# Patient Record
Sex: FEMALE | Race: BLACK OR AFRICAN AMERICAN | Employment: PART TIME | URBAN - METROPOLITAN AREA
[De-identification: names, ages, dates, MRNs, and addresses within clinical notes are randomized per-mention and may not be internally consistent; named-entity substitution may affect disease eponyms.]

---

## 2018-10-09 ENCOUNTER — HOSPITAL ENCOUNTER (EMERGENCY)
Age: 43
Discharge: HOME | End: 2018-10-09
Attending: EMERGENCY MEDICINE
Payer: MEDICAID

## 2018-10-09 VITALS
RESPIRATION RATE: 18 BRPM | DIASTOLIC BLOOD PRESSURE: 112 MMHG | OXYGEN SATURATION: 99 % | HEART RATE: 87 BPM | TEMPERATURE: 98.1 F | SYSTOLIC BLOOD PRESSURE: 159 MMHG | WEIGHT: 189 LBS | BODY MASS INDEX: 31.45 KG/M2

## 2018-10-09 DIAGNOSIS — J20.9 ACUTE BRONCHITIS, UNSPECIFIED ORGANISM: Primary | ICD-10-CM

## 2018-10-09 PROCEDURE — G0381 LEV 2 HOSP TYPE B ED VISIT: HCPCS

## 2018-10-09 PROCEDURE — 99282 EMERGENCY DEPT VISIT SF MDM: CPT

## 2018-10-09 RX ORDER — BROMPHENIRAMINE MALEATE, PSEUDOEPHEDRINE HYDROCHLORIDE, AND DEXTROMETHORPHAN HYDROBROMIDE 2; 30; 10 MG/5ML; MG/5ML; MG/5ML
5 SYRUP ORAL 4 TIMES DAILY PRN
Qty: 120 ML | Refills: 0 | Status: SHIPPED | OUTPATIENT
Start: 2018-10-09 | End: 2018-11-01

## 2018-10-09 RX ORDER — DOXYCYCLINE 100 MG/1
100 CAPSULE ORAL 2 TIMES DAILY
Qty: 20 CAPSULE | Refills: 0 | Status: SHIPPED | OUTPATIENT
Start: 2018-10-09 | End: 2018-11-01

## 2018-10-09 ASSESSMENT — ENCOUNTER SYMPTOMS
SINUS PRESSURE: 0
NAUSEA: 0
VOMITING: 0
EYE DISCHARGE: 0
EYE PAIN: 0
ABDOMINAL DISTENTION: 0
SHORTNESS OF BREATH: 0
DIARRHEA: 0
EYE REDNESS: 0
WHEEZING: 0
RHINORRHEA: 1
COUGH: 1
SORE THROAT: 0
BACK PAIN: 0

## 2018-11-01 ENCOUNTER — HOSPITAL ENCOUNTER (EMERGENCY)
Age: 43
Discharge: HOME | End: 2018-11-01
Attending: EMERGENCY MEDICINE
Payer: MEDICAID

## 2018-11-01 VITALS
TEMPERATURE: 98.2 F | RESPIRATION RATE: 20 BRPM | WEIGHT: 198 LBS | DIASTOLIC BLOOD PRESSURE: 94 MMHG | BODY MASS INDEX: 32.95 KG/M2 | HEART RATE: 84 BPM | OXYGEN SATURATION: 100 % | SYSTOLIC BLOOD PRESSURE: 144 MMHG

## 2018-11-01 DIAGNOSIS — Z20.2 POSSIBLE EXPOSURE TO STD: Primary | ICD-10-CM

## 2018-11-01 LAB
BILIRUBIN URINE: NEGATIVE
BLOOD, URINE: NEGATIVE
CLARITY: CLEAR
COLOR: YELLOW
GLUCOSE URINE: NEGATIVE MG/DL
HCG(URINE) PREGNANCY TEST: NEGATIVE
KETONES, URINE: NEGATIVE MG/DL
LEUKOCYTE ESTERASE, URINE: NEGATIVE
NITRITE, URINE: NEGATIVE
PH UA: 7.5 (ref 5–9)
PROTEIN UA: NEGATIVE MG/DL
SPECIFIC GRAVITY UA: 1.01 (ref 1–1.03)
UROBILINOGEN, URINE: 0.2 E.U./DL

## 2018-11-01 PROCEDURE — 81025 URINE PREGNANCY TEST: CPT

## 2018-11-01 PROCEDURE — 6370000000 HC RX 637 (ALT 250 FOR IP): Performed by: EMERGENCY MEDICINE

## 2018-11-01 PROCEDURE — 87491 CHLMYD TRACH DNA AMP PROBE: CPT

## 2018-11-01 PROCEDURE — G0382 LEV 3 HOSP TYPE B ED VISIT: HCPCS

## 2018-11-01 PROCEDURE — 99283 EMERGENCY DEPT VISIT LOW MDM: CPT

## 2018-11-01 PROCEDURE — 87591 N.GONORRHOEAE DNA AMP PROB: CPT

## 2018-11-01 PROCEDURE — 6360000002 HC RX W HCPCS: Performed by: EMERGENCY MEDICINE

## 2018-11-01 PROCEDURE — 96372 THER/PROPH/DIAG INJ SC/IM: CPT

## 2018-11-01 PROCEDURE — 81003 URINALYSIS AUTO W/O SCOPE: CPT

## 2018-11-01 RX ORDER — AZITHROMYCIN 250 MG/1
1000 TABLET, FILM COATED ORAL ONCE
Status: COMPLETED | OUTPATIENT
Start: 2018-11-01 | End: 2018-11-01

## 2018-11-01 RX ORDER — CEFTRIAXONE SODIUM 250 MG/1
250 INJECTION, POWDER, FOR SOLUTION INTRAMUSCULAR; INTRAVENOUS ONCE
Status: COMPLETED | OUTPATIENT
Start: 2018-11-01 | End: 2018-11-01

## 2018-11-01 RX ADMIN — CEFTRIAXONE 250 MG: 250 INJECTION, POWDER, FOR SOLUTION INTRAMUSCULAR; INTRAVENOUS at 14:24

## 2018-11-01 RX ADMIN — AZITHROMYCIN 1000 MG: 250 TABLET, FILM COATED ORAL at 14:25

## 2018-11-01 NOTE — ED PROVIDER NOTES
addition to providing specific details for the plan of care and counseling regarding the diagnosis and prognosis. Questions are answered at this time and they are agreeable with the plan.      --------------------------------- IMPRESSION AND DISPOSITION ---------------------------------    IMPRESSION  1.  Possible exposure to STD        DISPOSITION  Disposition: Discharge to home  Patient condition is good                  Mateus Da Silva MD  11/01/18 1443

## 2018-11-06 LAB
CHLAMYDIA TRACHOMATIS AMPLIFIED DET: NORMAL
N GONORRHOEAE AMPLIFIED DET: NORMAL

## 2018-11-15 RX ORDER — SODIUM CHLORIDE 9 MG/ML
INJECTION, SOLUTION INTRAVENOUS CONTINUOUS
Status: CANCELLED | OUTPATIENT
Start: 2018-11-28

## 2018-11-16 ENCOUNTER — HOSPITAL ENCOUNTER (OUTPATIENT)
Dept: PREADMISSION TESTING | Age: 43
Discharge: HOME | End: 2018-11-16
Payer: MEDICAID

## 2018-11-16 VITALS
HEIGHT: 65 IN | SYSTOLIC BLOOD PRESSURE: 140 MMHG | OXYGEN SATURATION: 99 % | HEART RATE: 71 BPM | RESPIRATION RATE: 16 BRPM | DIASTOLIC BLOOD PRESSURE: 82 MMHG | BODY MASS INDEX: 32.37 KG/M2 | WEIGHT: 194.31 LBS | TEMPERATURE: 98.5 F

## 2018-11-16 DIAGNOSIS — E11.9 TYPE 2 DIABETES MELLITUS WITHOUT COMPLICATION, WITHOUT LONG-TERM CURRENT USE OF INSULIN (HCC): Primary | ICD-10-CM

## 2018-11-16 NOTE — PROGRESS NOTES
[address]                                                                                                                     PRE OP INSTRUCTIONS FOR  Davonte Leaver        Date: 11/16/2018    Date and time of surgery: 11/28/18   Arrival Time: will call you on Tuesday 11/27 with surgery and arrival time report to main lobby stop at information desk    1. Do not eat or drink anything after 12 midnight prior to surgery. This includes no water, chewing gum, mints or ice chips. 2. Take the following pills with a small sip of water on the morning of Surgery: take inhaler if needed and bring inhaler    3. Diabetics may take evening dose of insulin but none after midnight. If you feel symptomatic or low blood sugar take 1-2 ounces of apple juice only. 4. Aspirin, Ibuprofen, Advil, Naproxen, Vitamin E and other Anti-inflammatory products should be stopped  before surgery  as directed by your physician. 5. Check with your Doctor regarding stopping Plavix, Coumadin, Lovenox, Fragmin or other blood thinners. 6. Do not smoke,use illicit drugs and do not drink any alcoholic beverages 24 hours prior to surgery. 7. You may brush your teeth and gargle the morning of surgery. DO NOT SWALLOW WATER    8. You MUST make arrangements for a responsible adult to take you home after your surgery. You will not be allowed to leave alone or drive yourself home. It is strongly suggested someone stay with you the first 24 hrs. Your surgery will be cancelled if you do not have a ride home. 9. A parent/legal guardian must accompany a child scheduled for surgery and plan to stay at the hospital until the child is discharged. Please do not bring other children with you. 10. Please wear simple, loose fitting clothing to the hospital.  Yvette Fine not bring valuables (money, credit cards, checkbooks, etc.) Do not wear any makeup (including no eye makeup) or nail polish on your fingers or toes.     11. DO NOT wear any

## 2018-11-27 PROBLEM — R10.2 PELVIC PAIN IN FEMALE: Status: ACTIVE | Noted: 2018-11-27

## 2018-11-27 LAB
ABO/RH: NORMAL
AMPHETAMINE SCREEN, URINE: NOT DETECTED
ANION GAP SERPL CALCULATED.3IONS-SCNC: 11 MMOL/L (ref 7–16)
ANTIBODY SCREEN: NORMAL
BARBITURATE SCREEN URINE: NOT DETECTED
BENZODIAZEPINE SCREEN, URINE: NOT DETECTED
BUN BLDV-MCNC: 6 MG/DL (ref 6–20)
CALCIUM SERPL-MCNC: 8.8 MG/DL (ref 8.6–10.2)
CANNABINOID SCREEN URINE: POSITIVE
CHLORIDE BLD-SCNC: 103 MMOL/L (ref 98–107)
CO2: 25 MMOL/L (ref 22–29)
COCAINE METABOLITE SCREEN URINE: NOT DETECTED
CREAT SERPL-MCNC: 0.6 MG/DL (ref 0.5–1)
EKG ATRIAL RATE: 73 BPM
EKG P AXIS: 62 DEGREES
EKG P-R INTERVAL: 196 MS
EKG Q-T INTERVAL: 402 MS
EKG QRS DURATION: 76 MS
EKG QTC CALCULATION (BAZETT): 442 MS
EKG R AXIS: 32 DEGREES
EKG T AXIS: 19 DEGREES
EKG VENTRICULAR RATE: 73 BPM
GFR AFRICAN AMERICAN: >60
GFR NON-AFRICAN AMERICAN: >60 ML/MIN/1.73
GLUCOSE BLD-MCNC: 103 MG/DL (ref 74–99)
HCG(URINE) PREGNANCY TEST: NEGATIVE
HCT VFR BLD CALC: 37 % (ref 34–48)
HEMOGLOBIN: 12.7 G/DL (ref 11.5–15.5)
MCH RBC QN AUTO: 26.6 PG (ref 26–35)
MCHC RBC AUTO-ENTMCNC: 34.3 % (ref 32–34.5)
MCV RBC AUTO: 77.4 FL (ref 80–99.9)
METHADONE SCREEN, URINE: NOT DETECTED
OPIATE SCREEN URINE: NOT DETECTED
PDW BLD-RTO: 12.6 FL (ref 11.5–15)
PHENCYCLIDINE SCREEN URINE: NOT DETECTED
PLATELET # BLD: 258 E9/L (ref 130–450)
PMV BLD AUTO: 9.3 FL (ref 7–12)
POTASSIUM REFLEX MAGNESIUM: 3.7 MMOL/L (ref 3.5–5)
PROPOXYPHENE SCREEN: NOT DETECTED
RBC # BLD: 4.78 E12/L (ref 3.5–5.5)
SODIUM BLD-SCNC: 139 MMOL/L (ref 132–146)
WBC # BLD: 8.9 E9/L (ref 4.5–11.5)

## 2018-11-27 PROCEDURE — 93005 ELECTROCARDIOGRAM TRACING: CPT | Performed by: ANESTHESIOLOGY

## 2018-11-28 ENCOUNTER — ANESTHESIA (OUTPATIENT)
Dept: OPERATING ROOM | Age: 43
DRG: 513 | End: 2018-11-28
Payer: MEDICAID

## 2018-11-28 ENCOUNTER — HOSPITAL ENCOUNTER (INPATIENT)
Age: 43
LOS: 3 days | Discharge: HOME | DRG: 513 | End: 2018-12-01
Attending: OBSTETRICS & GYNECOLOGY | Admitting: OBSTETRICS & GYNECOLOGY
Payer: MEDICAID

## 2018-11-28 ENCOUNTER — ANESTHESIA EVENT (OUTPATIENT)
Dept: OPERATING ROOM | Age: 43
DRG: 513 | End: 2018-11-28
Payer: MEDICAID

## 2018-11-28 VITALS
SYSTOLIC BLOOD PRESSURE: 167 MMHG | DIASTOLIC BLOOD PRESSURE: 103 MMHG | TEMPERATURE: 97.2 F | RESPIRATION RATE: 16 BRPM | OXYGEN SATURATION: 100 %

## 2018-11-28 DIAGNOSIS — R10.2 PELVIC PAIN IN FEMALE: Primary | ICD-10-CM

## 2018-11-28 DIAGNOSIS — G89.18 POSTOPERATIVE PAIN: ICD-10-CM

## 2018-11-28 LAB — METER GLUCOSE: 145 MG/DL (ref 74–99)

## 2018-11-28 PROCEDURE — 6360000002 HC RX W HCPCS: Performed by: ANESTHESIOLOGY

## 2018-11-28 PROCEDURE — 82962 GLUCOSE BLOOD TEST: CPT

## 2018-11-28 PROCEDURE — 0UT50ZZ RESECTION OF RIGHT FALLOPIAN TUBE, OPEN APPROACH: ICD-10-PCS | Performed by: OBSTETRICS & GYNECOLOGY

## 2018-11-28 PROCEDURE — 2580000003 HC RX 258: Performed by: ANESTHESIOLOGY

## 2018-11-28 PROCEDURE — 85027 COMPLETE CBC AUTOMATED: CPT

## 2018-11-28 PROCEDURE — 80048 BASIC METABOLIC PNL TOTAL CA: CPT

## 2018-11-28 PROCEDURE — 80307 DRUG TEST PRSMV CHEM ANLYZR: CPT

## 2018-11-28 PROCEDURE — 7100000000 HC PACU RECOVERY - FIRST 15 MIN: Performed by: OBSTETRICS & GYNECOLOGY

## 2018-11-28 PROCEDURE — G0480 DRUG TEST DEF 1-7 CLASSES: HCPCS

## 2018-11-28 PROCEDURE — 6360000002 HC RX W HCPCS

## 2018-11-28 PROCEDURE — 2580000003 HC RX 258: Performed by: OBSTETRICS & GYNECOLOGY

## 2018-11-28 PROCEDURE — 3600000004 HC SURGERY LEVEL 4 BASE: Performed by: OBSTETRICS & GYNECOLOGY

## 2018-11-28 PROCEDURE — 2500000003 HC RX 250 WO HCPCS

## 2018-11-28 PROCEDURE — 6360000002 HC RX W HCPCS: Performed by: OBSTETRICS & GYNECOLOGY

## 2018-11-28 PROCEDURE — 1200000000 HC SEMI PRIVATE

## 2018-11-28 PROCEDURE — 36415 COLL VENOUS BLD VENIPUNCTURE: CPT

## 2018-11-28 PROCEDURE — 2720000010 HC SURG SUPPLY STERILE: Performed by: OBSTETRICS & GYNECOLOGY

## 2018-11-28 PROCEDURE — 86900 BLOOD TYPING SEROLOGIC ABO: CPT

## 2018-11-28 PROCEDURE — 0UT90ZZ RESECTION OF UTERUS, OPEN APPROACH: ICD-10-PCS | Performed by: OBSTETRICS & GYNECOLOGY

## 2018-11-28 PROCEDURE — 6370000000 HC RX 637 (ALT 250 FOR IP): Performed by: OBSTETRICS & GYNECOLOGY

## 2018-11-28 PROCEDURE — 3600000014 HC SURGERY LEVEL 4 ADDTL 15MIN: Performed by: OBSTETRICS & GYNECOLOGY

## 2018-11-28 PROCEDURE — 86901 BLOOD TYPING SEROLOGIC RH(D): CPT

## 2018-11-28 PROCEDURE — 2709999900 HC NON-CHARGEABLE SUPPLY: Performed by: OBSTETRICS & GYNECOLOGY

## 2018-11-28 PROCEDURE — 81025 URINE PREGNANCY TEST: CPT

## 2018-11-28 PROCEDURE — 7100000001 HC PACU RECOVERY - ADDTL 15 MIN: Performed by: OBSTETRICS & GYNECOLOGY

## 2018-11-28 PROCEDURE — C1765 ADHESION BARRIER: HCPCS | Performed by: OBSTETRICS & GYNECOLOGY

## 2018-11-28 PROCEDURE — 2700000000 HC OXYGEN THERAPY PER DAY

## 2018-11-28 PROCEDURE — 86850 RBC ANTIBODY SCREEN: CPT

## 2018-11-28 PROCEDURE — 88307 TISSUE EXAM BY PATHOLOGIST: CPT

## 2018-11-28 PROCEDURE — 3700000001 HC ADD 15 MINUTES (ANESTHESIA): Performed by: OBSTETRICS & GYNECOLOGY

## 2018-11-28 PROCEDURE — 3700000000 HC ANESTHESIA ATTENDED CARE: Performed by: OBSTETRICS & GYNECOLOGY

## 2018-11-28 RX ORDER — ACETAMINOPHEN 325 MG/1
650 TABLET ORAL EVERY 4 HOURS PRN
Status: DISCONTINUED | OUTPATIENT
Start: 2018-11-28 | End: 2018-12-01 | Stop reason: HOSPADM

## 2018-11-28 RX ORDER — SODIUM CHLORIDE 0.9 % (FLUSH) 0.9 %
10 SYRINGE (ML) INJECTION PRN
Status: DISCONTINUED | OUTPATIENT
Start: 2018-11-28 | End: 2018-12-01 | Stop reason: HOSPADM

## 2018-11-28 RX ORDER — MORPHINE SULFATE 2 MG/ML
2 INJECTION, SOLUTION INTRAMUSCULAR; INTRAVENOUS EVERY 4 HOURS PRN
Status: DISCONTINUED | OUTPATIENT
Start: 2018-11-28 | End: 2018-12-01 | Stop reason: HOSPADM

## 2018-11-28 RX ORDER — NEOSTIGMINE METHYLSULFATE 1 MG/ML
INJECTION, SOLUTION INTRAVENOUS PRN
Status: DISCONTINUED | OUTPATIENT
Start: 2018-11-28 | End: 2018-11-28 | Stop reason: SDUPTHER

## 2018-11-28 RX ORDER — ROCURONIUM BROMIDE 10 MG/ML
INJECTION, SOLUTION INTRAVENOUS PRN
Status: DISCONTINUED | OUTPATIENT
Start: 2018-11-28 | End: 2018-11-28 | Stop reason: SDUPTHER

## 2018-11-28 RX ORDER — LABETALOL HYDROCHLORIDE 5 MG/ML
INJECTION, SOLUTION INTRAVENOUS PRN
Status: DISCONTINUED | OUTPATIENT
Start: 2018-11-28 | End: 2018-11-28 | Stop reason: SDUPTHER

## 2018-11-28 RX ORDER — DOCUSATE SODIUM 100 MG/1
100 CAPSULE, LIQUID FILLED ORAL 2 TIMES DAILY
Status: DISCONTINUED | OUTPATIENT
Start: 2018-11-28 | End: 2018-12-01 | Stop reason: HOSPADM

## 2018-11-28 RX ORDER — SODIUM CHLORIDE, SODIUM LACTATE, POTASSIUM CHLORIDE, CALCIUM CHLORIDE 600; 310; 30; 20 MG/100ML; MG/100ML; MG/100ML; MG/100ML
INJECTION, SOLUTION INTRAVENOUS CONTINUOUS
Status: DISCONTINUED | OUTPATIENT
Start: 2018-11-28 | End: 2018-12-01 | Stop reason: HOSPADM

## 2018-11-28 RX ORDER — SODIUM CHLORIDE 0.9 % (FLUSH) 0.9 %
10 SYRINGE (ML) INJECTION EVERY 12 HOURS SCHEDULED
Status: DISCONTINUED | OUTPATIENT
Start: 2018-11-28 | End: 2018-11-28 | Stop reason: HOSPADM

## 2018-11-28 RX ORDER — LIDOCAINE HYDROCHLORIDE 20 MG/ML
INJECTION, SOLUTION INFILTRATION; PERINEURAL PRN
Status: DISCONTINUED | OUTPATIENT
Start: 2018-11-28 | End: 2018-11-28 | Stop reason: SDUPTHER

## 2018-11-28 RX ORDER — ONDANSETRON 2 MG/ML
INJECTION INTRAMUSCULAR; INTRAVENOUS PRN
Status: DISCONTINUED | OUTPATIENT
Start: 2018-11-28 | End: 2018-11-28 | Stop reason: SDUPTHER

## 2018-11-28 RX ORDER — SODIUM CHLORIDE 0.9 % (FLUSH) 0.9 %
10 SYRINGE (ML) INJECTION EVERY 12 HOURS SCHEDULED
Status: DISCONTINUED | OUTPATIENT
Start: 2018-11-28 | End: 2018-12-01 | Stop reason: HOSPADM

## 2018-11-28 RX ORDER — ONDANSETRON 2 MG/ML
4 INJECTION INTRAMUSCULAR; INTRAVENOUS ONCE
Status: DISCONTINUED | OUTPATIENT
Start: 2018-11-28 | End: 2018-12-01 | Stop reason: HOSPADM

## 2018-11-28 RX ORDER — ONDANSETRON 2 MG/ML
4 INJECTION INTRAMUSCULAR; INTRAVENOUS EVERY 6 HOURS PRN
Status: DISCONTINUED | OUTPATIENT
Start: 2018-11-28 | End: 2018-12-01 | Stop reason: HOSPADM

## 2018-11-28 RX ORDER — MIDAZOLAM HYDROCHLORIDE 1 MG/ML
INJECTION INTRAMUSCULAR; INTRAVENOUS PRN
Status: DISCONTINUED | OUTPATIENT
Start: 2018-11-28 | End: 2018-11-28 | Stop reason: SDUPTHER

## 2018-11-28 RX ORDER — CEFOXITIN SODIUM 2 G/50ML
2 INJECTION, SOLUTION INTRAVENOUS ONCE
Status: COMPLETED | OUTPATIENT
Start: 2018-11-28 | End: 2018-11-28

## 2018-11-28 RX ORDER — SODIUM CHLORIDE 9 MG/ML
INJECTION, SOLUTION INTRAVENOUS CONTINUOUS
Status: DISCONTINUED | OUTPATIENT
Start: 2018-11-28 | End: 2018-11-28

## 2018-11-28 RX ORDER — SODIUM CHLORIDE, SODIUM LACTATE, POTASSIUM CHLORIDE, CALCIUM CHLORIDE 600; 310; 30; 20 MG/100ML; MG/100ML; MG/100ML; MG/100ML
INJECTION, SOLUTION INTRAVENOUS CONTINUOUS
Status: DISCONTINUED | OUTPATIENT
Start: 2018-11-28 | End: 2018-11-28

## 2018-11-28 RX ORDER — ONDANSETRON 2 MG/ML
8 INJECTION INTRAMUSCULAR; INTRAVENOUS EVERY 6 HOURS PRN
Status: DISCONTINUED | OUTPATIENT
Start: 2018-11-28 | End: 2018-11-28

## 2018-11-28 RX ORDER — DEXAMETHASONE SODIUM PHOSPHATE 10 MG/ML
INJECTION, SOLUTION INTRAMUSCULAR; INTRAVENOUS PRN
Status: DISCONTINUED | OUTPATIENT
Start: 2018-11-28 | End: 2018-11-28 | Stop reason: SDUPTHER

## 2018-11-28 RX ORDER — ALBUTEROL SULFATE 90 UG/1
2 AEROSOL, METERED RESPIRATORY (INHALATION) EVERY 6 HOURS PRN
Status: DISCONTINUED | OUTPATIENT
Start: 2018-11-28 | End: 2018-11-30 | Stop reason: SDUPTHER

## 2018-11-28 RX ORDER — FENTANYL CITRATE 50 UG/ML
INJECTION, SOLUTION INTRAMUSCULAR; INTRAVENOUS PRN
Status: DISCONTINUED | OUTPATIENT
Start: 2018-11-28 | End: 2018-11-28 | Stop reason: SDUPTHER

## 2018-11-28 RX ORDER — OXYCODONE HYDROCHLORIDE AND ACETAMINOPHEN 5; 325 MG/1; MG/1
1 TABLET ORAL EVERY 4 HOURS PRN
Status: DISCONTINUED | OUTPATIENT
Start: 2018-11-28 | End: 2018-12-01 | Stop reason: HOSPADM

## 2018-11-28 RX ORDER — PROPOFOL 10 MG/ML
INJECTION, EMULSION INTRAVENOUS PRN
Status: DISCONTINUED | OUTPATIENT
Start: 2018-11-28 | End: 2018-11-28 | Stop reason: SDUPTHER

## 2018-11-28 RX ORDER — GLYCOPYRROLATE 0.2 MG/ML
INJECTION INTRAMUSCULAR; INTRAVENOUS PRN
Status: DISCONTINUED | OUTPATIENT
Start: 2018-11-28 | End: 2018-11-28 | Stop reason: SDUPTHER

## 2018-11-28 RX ORDER — PROMETHAZINE HYDROCHLORIDE 25 MG/ML
25 INJECTION, SOLUTION INTRAMUSCULAR; INTRAVENOUS EVERY 6 HOURS PRN
Status: DISCONTINUED | OUTPATIENT
Start: 2018-11-28 | End: 2018-12-01 | Stop reason: HOSPADM

## 2018-11-28 RX ORDER — SODIUM CHLORIDE 0.9 % (FLUSH) 0.9 %
10 SYRINGE (ML) INJECTION PRN
Status: DISCONTINUED | OUTPATIENT
Start: 2018-11-28 | End: 2018-11-28 | Stop reason: HOSPADM

## 2018-11-28 RX ADMIN — GLYCOPYRROLATE 0.6 MG: 0.2 INJECTION, SOLUTION INTRAMUSCULAR; INTRAVENOUS at 11:18

## 2018-11-28 RX ADMIN — PROPOFOL 200 MG: 10 INJECTION, EMULSION INTRAVENOUS at 09:48

## 2018-11-28 RX ADMIN — ONDANSETRON 8 MG: 2 INJECTION INTRAMUSCULAR; INTRAVENOUS at 18:36

## 2018-11-28 RX ADMIN — SODIUM CHLORIDE: 9 INJECTION, SOLUTION INTRAVENOUS at 10:35

## 2018-11-28 RX ADMIN — PROMETHAZINE HYDROCHLORIDE 25 MG: 25 INJECTION INTRAMUSCULAR; INTRAVENOUS at 21:12

## 2018-11-28 RX ADMIN — HYDROMORPHONE HYDROCHLORIDE 0.5 MG: 1 INJECTION, SOLUTION INTRAMUSCULAR; INTRAVENOUS; SUBCUTANEOUS at 11:47

## 2018-11-28 RX ADMIN — LABETALOL 20 MG/4 ML (5 MG/ML) INTRAVENOUS SYRINGE 2.5 MG: at 10:22

## 2018-11-28 RX ADMIN — SODIUM CHLORIDE: 9 INJECTION, SOLUTION INTRAVENOUS at 09:44

## 2018-11-28 RX ADMIN — ONDANSETRON HYDROCHLORIDE 4 MG: 2 INJECTION, SOLUTION INTRAMUSCULAR; INTRAVENOUS at 11:11

## 2018-11-28 RX ADMIN — OXYCODONE AND ACETAMINOPHEN 1 TABLET: 5; 325 TABLET ORAL at 13:30

## 2018-11-28 RX ADMIN — FENTANYL CITRATE 50 MCG: 50 INJECTION, SOLUTION INTRAMUSCULAR; INTRAVENOUS at 10:10

## 2018-11-28 RX ADMIN — SODIUM CHLORIDE, POTASSIUM CHLORIDE, SODIUM LACTATE AND CALCIUM CHLORIDE: 600; 310; 30; 20 INJECTION, SOLUTION INTRAVENOUS at 17:04

## 2018-11-28 RX ADMIN — Medication 3 MG: at 11:18

## 2018-11-28 RX ADMIN — HYDROMORPHONE HYDROCHLORIDE 0.5 MG: 1 INJECTION, SOLUTION INTRAMUSCULAR; INTRAVENOUS; SUBCUTANEOUS at 12:12

## 2018-11-28 RX ADMIN — ONDANSETRON 4 MG: 2 INJECTION INTRAMUSCULAR; INTRAVENOUS at 13:30

## 2018-11-28 RX ADMIN — MORPHINE SULFATE 2 MG: 2 INJECTION, SOLUTION INTRAMUSCULAR; INTRAVENOUS at 21:12

## 2018-11-28 RX ADMIN — ROCURONIUM BROMIDE 5 MG: 10 INJECTION, SOLUTION INTRAVENOUS at 10:49

## 2018-11-28 RX ADMIN — CEFOXITIN SODIUM 2 G: 2 INJECTION, SOLUTION INTRAVENOUS at 09:46

## 2018-11-28 RX ADMIN — SODIUM CHLORIDE: 9 INJECTION, SOLUTION INTRAVENOUS at 07:36

## 2018-11-28 RX ADMIN — MIDAZOLAM 2 MG: 1 INJECTION INTRAMUSCULAR; INTRAVENOUS at 09:44

## 2018-11-28 RX ADMIN — ROCURONIUM BROMIDE 50 MG: 10 INJECTION, SOLUTION INTRAVENOUS at 09:48

## 2018-11-28 RX ADMIN — FENTANYL CITRATE 100 MCG: 50 INJECTION, SOLUTION INTRAMUSCULAR; INTRAVENOUS at 09:48

## 2018-11-28 RX ADMIN — HYDROMORPHONE HYDROCHLORIDE 0.5 MG: 1 INJECTION, SOLUTION INTRAMUSCULAR; INTRAVENOUS; SUBCUTANEOUS at 11:41

## 2018-11-28 RX ADMIN — DEXAMETHASONE SODIUM PHOSPHATE 10 MG: 10 INJECTION, SOLUTION INTRAMUSCULAR; INTRAVENOUS at 10:00

## 2018-11-28 RX ADMIN — DOCUSATE SODIUM 100 MG: 100 CAPSULE, LIQUID FILLED ORAL at 13:30

## 2018-11-28 RX ADMIN — HYDROMORPHONE HYDROCHLORIDE 0.5 MG: 1 INJECTION, SOLUTION INTRAMUSCULAR; INTRAVENOUS; SUBCUTANEOUS at 12:05

## 2018-11-28 RX ADMIN — LIDOCAINE HYDROCHLORIDE 100 MG: 20 INJECTION, SOLUTION INFILTRATION; PERINEURAL at 09:48

## 2018-11-28 ASSESSMENT — PULMONARY FUNCTION TESTS
PIF_VALUE: 22
PIF_VALUE: 3
PIF_VALUE: 28
PIF_VALUE: 4
PIF_VALUE: 24
PIF_VALUE: 22
PIF_VALUE: 19
PIF_VALUE: 18
PIF_VALUE: 19
PIF_VALUE: 0
PIF_VALUE: 22
PIF_VALUE: 21
PIF_VALUE: 22
PIF_VALUE: 21
PIF_VALUE: 19
PIF_VALUE: 21
PIF_VALUE: 0
PIF_VALUE: 21
PIF_VALUE: 22
PIF_VALUE: 21
PIF_VALUE: 19
PIF_VALUE: 21
PIF_VALUE: 17
PIF_VALUE: 33
PIF_VALUE: 21
PIF_VALUE: 20
PIF_VALUE: 22
PIF_VALUE: 21
PIF_VALUE: 19
PIF_VALUE: 22
PIF_VALUE: 21
PIF_VALUE: 22
PIF_VALUE: 22
PIF_VALUE: 21
PIF_VALUE: 23
PIF_VALUE: 19
PIF_VALUE: 18
PIF_VALUE: 19
PIF_VALUE: 22
PIF_VALUE: 21
PIF_VALUE: 6
PIF_VALUE: 21
PIF_VALUE: 21
PIF_VALUE: 22
PIF_VALUE: 19
PIF_VALUE: 21
PIF_VALUE: 22
PIF_VALUE: 19
PIF_VALUE: 22
PIF_VALUE: 20
PIF_VALUE: 22
PIF_VALUE: 21
PIF_VALUE: 22
PIF_VALUE: 22
PIF_VALUE: 21
PIF_VALUE: 13
PIF_VALUE: 28
PIF_VALUE: 18
PIF_VALUE: 21
PIF_VALUE: 22
PIF_VALUE: 21
PIF_VALUE: 22
PIF_VALUE: 19
PIF_VALUE: 21
PIF_VALUE: 3
PIF_VALUE: 19
PIF_VALUE: 21
PIF_VALUE: 22
PIF_VALUE: 21
PIF_VALUE: 3
PIF_VALUE: 22
PIF_VALUE: 19
PIF_VALUE: 21
PIF_VALUE: 19
PIF_VALUE: 21
PIF_VALUE: 20
PIF_VALUE: 21
PIF_VALUE: 19
PIF_VALUE: 22
PIF_VALUE: 22
PIF_VALUE: 19
PIF_VALUE: 24
PIF_VALUE: 19
PIF_VALUE: 19
PIF_VALUE: 22
PIF_VALUE: 20
PIF_VALUE: 21
PIF_VALUE: 22
PIF_VALUE: 0
PIF_VALUE: 19
PIF_VALUE: 34
PIF_VALUE: 21
PIF_VALUE: 19

## 2018-11-28 ASSESSMENT — PAIN DESCRIPTION - PAIN TYPE
TYPE: SURGICAL PAIN
TYPE: ACUTE PAIN;SURGICAL PAIN
TYPE: SURGICAL PAIN
TYPE: ACUTE PAIN;SURGICAL PAIN
TYPE: SURGICAL PAIN

## 2018-11-28 ASSESSMENT — PAIN DESCRIPTION - PROGRESSION
CLINICAL_PROGRESSION: NOT CHANGED
CLINICAL_PROGRESSION: GRADUALLY IMPROVING
CLINICAL_PROGRESSION: NOT CHANGED
CLINICAL_PROGRESSION: GRADUALLY WORSENING

## 2018-11-28 ASSESSMENT — PAIN DESCRIPTION - FREQUENCY
FREQUENCY: CONTINUOUS

## 2018-11-28 ASSESSMENT — PAIN SCALES - GENERAL
PAINLEVEL_OUTOF10: 9
PAINLEVEL_OUTOF10: 0
PAINLEVEL_OUTOF10: 7
PAINLEVEL_OUTOF10: 7
PAINLEVEL_OUTOF10: 9
PAINLEVEL_OUTOF10: 10
PAINLEVEL_OUTOF10: 6
PAINLEVEL_OUTOF10: 9
PAINLEVEL_OUTOF10: 9
PAINLEVEL_OUTOF10: 10

## 2018-11-28 ASSESSMENT — PAIN DESCRIPTION - DESCRIPTORS
DESCRIPTORS: ACHING;DULL
DESCRIPTORS: ACHING;SHARP;STABBING
DESCRIPTORS: ACHING
DESCRIPTORS: ACHING;DULL
DESCRIPTORS: ACHING;SHARP;STABBING

## 2018-11-28 ASSESSMENT — PAIN DESCRIPTION - LOCATION
LOCATION: ABDOMEN

## 2018-11-28 ASSESSMENT — PAIN DESCRIPTION - ORIENTATION
ORIENTATION: MID
ORIENTATION: LOWER
ORIENTATION: LOWER
ORIENTATION: LOWER;MID

## 2018-11-28 ASSESSMENT — PAIN DESCRIPTION - ONSET
ONSET: ON-GOING
ONSET: ON-GOING

## 2018-11-28 ASSESSMENT — LIFESTYLE VARIABLES: SMOKING_STATUS: 1

## 2018-11-28 ASSESSMENT — PAIN - FUNCTIONAL ASSESSMENT: PAIN_FUNCTIONAL_ASSESSMENT: 0-10

## 2018-11-28 NOTE — BRIEF OP NOTE
Brief Postoperative Note  ______________________________________________________________    Patient: Piter Horton  YOB: 1975  MRN: [id number]  Date of Procedure: 11/28/2018    Pre-Op Diagnosis: Pelvic pain, metrorrhagia and cervical dysplasia    Post-Op Diagnosis: Same       Procedure(s):  TOTAL ABDOMINAL HYSTERECTOMY  AND REMOVAL OF RIGHT FALLOPIAN TUBE    Anesthesia: General    Surgeon(s):  Jeni Bang MD    Assistant:     Estimated Blood Loss (mL): 50 ml    Complications: None    Specimens:   ID Type Source Tests Collected by Time Destination   A : UTERUS CERVIX AND RIGHT FALLOPIAN TUBE Tissue Uterus SURGICAL PATHOLOGY Jeni Bang MD 11/28/2018 1057        Implants:  * No implants in log *      Drains:   Urethral Catheter 16 fr (Active)       Findings: Right polycystic ovary    Jeni Bang MD  Date: 11/28/2018  Time: 11:28 AM

## 2018-11-28 NOTE — ANESTHESIA PRE PROCEDURE
POC Tests: No results for input(s): POCGLU, POCNA, POCK, POCCL, POCBUN, POCHEMO, POCHCT in the last 72 hours. Coags: No results found for: PROTIME, INR, APTT    HCG (If Applicable):   Lab Results   Component Value Date    PREGTESTUR NEGATIVE 11/27/2018        ABGs: No results found for: PHART, PO2ART, JKM9UTG, HHT8MMW, BEART, Q1XTJJWF     Type & Screen (If Applicable):  No results found for: [facility]    Anesthesia Evaluation  Patient summary reviewed  Airway: Mallampati: II  TM distance: >3 FB   Neck ROM: full  Mouth opening: > = 3 FB Dental:          Pulmonary: breath sounds clear to auscultation  (+) asthma: current smoker                           Cardiovascular:    (+) hypertension:,       ECG reviewed  Rhythm: regular  Rate: normal  Echocardiogram reviewed         Beta Blocker:  Not on Beta Blocker         Neuro/Psych:   (+) headaches:,              ROS comment: Dizziness. Memory difficulty. Vertigo. GI/Hepatic/Renal:             Endo/Other:    (+) DiabetesType II DM, , .                  ROS comment: Abnormal PAP Smear. Abdominal:           Vascular:                                        Anesthesia Plan      general     ASA 3       Induction: intravenous. BIS  MIPS: Postoperative opioids intended. Anesthetic plan and risks discussed with patient. Plan discussed with CRNA.     Attending anesthesiologist reviewed and agrees with Cesario Mcdowell MD   11/28/2018

## 2018-11-29 LAB
HCT VFR BLD CALC: 37 % (ref 34–48)
HEMOGLOBIN: 12.4 G/DL (ref 11.5–15.5)

## 2018-11-29 PROCEDURE — 6360000002 HC RX W HCPCS: Performed by: OBSTETRICS & GYNECOLOGY

## 2018-11-29 PROCEDURE — 36415 COLL VENOUS BLD VENIPUNCTURE: CPT

## 2018-11-29 PROCEDURE — 2580000003 HC RX 258: Performed by: OBSTETRICS & GYNECOLOGY

## 2018-11-29 PROCEDURE — 85014 HEMATOCRIT: CPT

## 2018-11-29 PROCEDURE — 85018 HEMOGLOBIN: CPT

## 2018-11-29 PROCEDURE — 6370000000 HC RX 637 (ALT 250 FOR IP): Performed by: OBSTETRICS & GYNECOLOGY

## 2018-11-29 PROCEDURE — 1200000000 HC SEMI PRIVATE

## 2018-11-29 RX ADMIN — MORPHINE SULFATE 2 MG: 2 INJECTION, SOLUTION INTRAMUSCULAR; INTRAVENOUS at 11:15

## 2018-11-29 RX ADMIN — MORPHINE SULFATE 2 MG: 2 INJECTION, SOLUTION INTRAMUSCULAR; INTRAVENOUS at 07:41

## 2018-11-29 RX ADMIN — METFORMIN HYDROCHLORIDE 500 MG: 500 TABLET ORAL at 16:31

## 2018-11-29 RX ADMIN — SODIUM CHLORIDE, POTASSIUM CHLORIDE, SODIUM LACTATE AND CALCIUM CHLORIDE: 600; 310; 30; 20 INJECTION, SOLUTION INTRAVENOUS at 04:46

## 2018-11-29 RX ADMIN — MORPHINE SULFATE 2 MG: 2 INJECTION, SOLUTION INTRAMUSCULAR; INTRAVENOUS at 20:24

## 2018-11-29 RX ADMIN — OXYCODONE AND ACETAMINOPHEN 1 TABLET: 5; 325 TABLET ORAL at 18:34

## 2018-11-29 RX ADMIN — DOCUSATE SODIUM 100 MG: 100 CAPSULE, LIQUID FILLED ORAL at 20:10

## 2018-11-29 RX ADMIN — PROMETHAZINE HYDROCHLORIDE 25 MG: 25 INJECTION INTRAMUSCULAR; INTRAVENOUS at 03:06

## 2018-11-29 RX ADMIN — DOCUSATE SODIUM 100 MG: 100 CAPSULE, LIQUID FILLED ORAL at 07:51

## 2018-11-29 RX ADMIN — Medication 10 ML: at 20:25

## 2018-11-29 RX ADMIN — OXYCODONE AND ACETAMINOPHEN 1 TABLET: 5; 325 TABLET ORAL at 14:34

## 2018-11-29 RX ADMIN — OXYCODONE AND ACETAMINOPHEN 1 TABLET: 5; 325 TABLET ORAL at 09:03

## 2018-11-29 RX ADMIN — METFORMIN HYDROCHLORIDE 500 MG: 500 TABLET ORAL at 07:41

## 2018-11-29 RX ADMIN — MORPHINE SULFATE 2 MG: 2 INJECTION, SOLUTION INTRAMUSCULAR; INTRAVENOUS at 02:54

## 2018-11-29 RX ADMIN — ONDANSETRON 4 MG: 2 INJECTION INTRAMUSCULAR; INTRAVENOUS at 07:41

## 2018-11-29 ASSESSMENT — PAIN DESCRIPTION - PAIN TYPE
TYPE: SURGICAL PAIN
TYPE: SURGICAL PAIN

## 2018-11-29 ASSESSMENT — PAIN SCALES - GENERAL
PAINLEVEL_OUTOF10: 8
PAINLEVEL_OUTOF10: 9
PAINLEVEL_OUTOF10: 7
PAINLEVEL_OUTOF10: 3
PAINLEVEL_OUTOF10: 10
PAINLEVEL_OUTOF10: 4
PAINLEVEL_OUTOF10: 2
PAINLEVEL_OUTOF10: 5
PAINLEVEL_OUTOF10: 8
PAINLEVEL_OUTOF10: 9
PAINLEVEL_OUTOF10: 8
PAINLEVEL_OUTOF10: 3

## 2018-11-29 ASSESSMENT — PAIN DESCRIPTION - LOCATION: LOCATION: ABDOMEN

## 2018-11-29 ASSESSMENT — PAIN DESCRIPTION - FREQUENCY: FREQUENCY: CONTINUOUS

## 2018-11-29 ASSESSMENT — PAIN DESCRIPTION - ONSET: ONSET: ON-GOING

## 2018-11-29 ASSESSMENT — PAIN DESCRIPTION - DESCRIPTORS: DESCRIPTORS: ACHING;DULL

## 2018-11-29 ASSESSMENT — PAIN DESCRIPTION - ORIENTATION: ORIENTATION: LOWER;MID

## 2018-11-29 ASSESSMENT — PAIN DESCRIPTION - PROGRESSION: CLINICAL_PROGRESSION: NOT CHANGED

## 2018-11-30 LAB
METER GLUCOSE: 104 MG/DL (ref 74–99)
METER GLUCOSE: 98 MG/DL (ref 74–99)

## 2018-11-30 PROCEDURE — 1200000000 HC SEMI PRIVATE

## 2018-11-30 PROCEDURE — 6360000002 HC RX W HCPCS: Performed by: OBSTETRICS & GYNECOLOGY

## 2018-11-30 PROCEDURE — 82962 GLUCOSE BLOOD TEST: CPT

## 2018-11-30 PROCEDURE — 6370000000 HC RX 637 (ALT 250 FOR IP): Performed by: OBSTETRICS & GYNECOLOGY

## 2018-11-30 PROCEDURE — 2580000003 HC RX 258: Performed by: OBSTETRICS & GYNECOLOGY

## 2018-11-30 RX ORDER — ALBUTEROL SULFATE 90 UG/1
2 AEROSOL, METERED RESPIRATORY (INHALATION) EVERY 6 HOURS PRN
Status: DISCONTINUED | OUTPATIENT
Start: 2018-11-30 | End: 2018-12-01 | Stop reason: HOSPADM

## 2018-11-30 RX ORDER — BISACODYL 10 MG
10 SUPPOSITORY, RECTAL RECTAL ONCE
Status: COMPLETED | OUTPATIENT
Start: 2018-11-30 | End: 2018-11-30

## 2018-11-30 RX ADMIN — METFORMIN HYDROCHLORIDE 500 MG: 500 TABLET ORAL at 09:48

## 2018-11-30 RX ADMIN — OXYCODONE AND ACETAMINOPHEN 1 TABLET: 5; 325 TABLET ORAL at 09:48

## 2018-11-30 RX ADMIN — Medication 10 ML: at 09:48

## 2018-11-30 RX ADMIN — ONDANSETRON 4 MG: 2 INJECTION INTRAMUSCULAR; INTRAVENOUS at 05:30

## 2018-11-30 RX ADMIN — DOCUSATE SODIUM 100 MG: 100 CAPSULE, LIQUID FILLED ORAL at 09:47

## 2018-11-30 RX ADMIN — OXYCODONE AND ACETAMINOPHEN 1 TABLET: 5; 325 TABLET ORAL at 14:34

## 2018-11-30 RX ADMIN — MORPHINE SULFATE 2 MG: 2 INJECTION, SOLUTION INTRAMUSCULAR; INTRAVENOUS at 01:36

## 2018-11-30 RX ADMIN — MORPHINE SULFATE 2 MG: 2 INJECTION, SOLUTION INTRAMUSCULAR; INTRAVENOUS at 20:57

## 2018-11-30 RX ADMIN — METFORMIN HYDROCHLORIDE 500 MG: 500 TABLET ORAL at 17:07

## 2018-11-30 RX ADMIN — DOCUSATE SODIUM 100 MG: 100 CAPSULE, LIQUID FILLED ORAL at 20:57

## 2018-11-30 RX ADMIN — ALBUTEROL SULFATE 2 PUFF: 90 AEROSOL, METERED RESPIRATORY (INHALATION) at 22:16

## 2018-11-30 RX ADMIN — SODIUM CHLORIDE, POTASSIUM CHLORIDE, SODIUM LACTATE AND CALCIUM CHLORIDE: 600; 310; 30; 20 INJECTION, SOLUTION INTRAVENOUS at 20:58

## 2018-11-30 RX ADMIN — OXYCODONE AND ACETAMINOPHEN 1 TABLET: 5; 325 TABLET ORAL at 18:34

## 2018-11-30 RX ADMIN — OXYCODONE AND ACETAMINOPHEN 1 TABLET: 5; 325 TABLET ORAL at 00:32

## 2018-11-30 RX ADMIN — OXYCODONE AND ACETAMINOPHEN 1 TABLET: 5; 325 TABLET ORAL at 05:33

## 2018-11-30 RX ADMIN — OXYCODONE AND ACETAMINOPHEN 1 TABLET: 5; 325 TABLET ORAL at 22:19

## 2018-11-30 RX ADMIN — BISACODYL 10 MG: 10 SUPPOSITORY RECTAL at 22:59

## 2018-11-30 ASSESSMENT — PAIN DESCRIPTION - LOCATION
LOCATION: ABDOMEN

## 2018-11-30 ASSESSMENT — PAIN SCALES - GENERAL
PAINLEVEL_OUTOF10: 9
PAINLEVEL_OUTOF10: 8
PAINLEVEL_OUTOF10: 4
PAINLEVEL_OUTOF10: 4
PAINLEVEL_OUTOF10: 10
PAINLEVEL_OUTOF10: 4
PAINLEVEL_OUTOF10: 7
PAINLEVEL_OUTOF10: 8
PAINLEVEL_OUTOF10: 10
PAINLEVEL_OUTOF10: 9
PAINLEVEL_OUTOF10: 10
PAINLEVEL_OUTOF10: 7

## 2018-11-30 ASSESSMENT — PAIN DESCRIPTION - PROGRESSION
CLINICAL_PROGRESSION: GRADUALLY IMPROVING
CLINICAL_PROGRESSION: NOT CHANGED
CLINICAL_PROGRESSION: GRADUALLY IMPROVING

## 2018-11-30 ASSESSMENT — PAIN DESCRIPTION - PAIN TYPE
TYPE: ACUTE PAIN
TYPE: ACUTE PAIN;SURGICAL PAIN
TYPE: ACUTE PAIN;SURGICAL PAIN
TYPE: SURGICAL PAIN

## 2018-11-30 ASSESSMENT — PAIN DESCRIPTION - ORIENTATION
ORIENTATION: LOWER;MID
ORIENTATION: MID

## 2018-11-30 ASSESSMENT — PAIN DESCRIPTION - FREQUENCY
FREQUENCY: INTERMITTENT
FREQUENCY: CONTINUOUS

## 2018-11-30 ASSESSMENT — PAIN DESCRIPTION - ONSET
ONSET: ON-GOING

## 2018-11-30 ASSESSMENT — PAIN DESCRIPTION - DESCRIPTORS
DESCRIPTORS: ACHING;DISCOMFORT;SORE
DESCRIPTORS: PRESSURE
DESCRIPTORS: ACHING;DISCOMFORT;SORE
DESCRIPTORS: ACHING;DULL;DISCOMFORT

## 2018-11-30 NOTE — PROGRESS NOTES
C/o abdominal distension causing inadequate pain relief. No UT complaints. No c/o leg pain or chest pain suggestive of venous thromboembolism. No vaginal bleeding. Ambulating with difficult  O/e small few mm gaping at the middle of the incision, no current oozing. 2 staples missing, replaced by steri-strips. A: S/p KEVIN postop d2     Abdominal distension,      Slight gaping of the incision in the middle, lost 2 staples     No other complications    P: Dulcolax supp ordered. Diet  Regular only if no distension.       Abdominal binder      Ventolin inhaler ordered

## 2018-12-01 VITALS
SYSTOLIC BLOOD PRESSURE: 129 MMHG | RESPIRATION RATE: 18 BRPM | TEMPERATURE: 97.9 F | HEIGHT: 65 IN | OXYGEN SATURATION: 99 % | BODY MASS INDEX: 32.32 KG/M2 | DIASTOLIC BLOOD PRESSURE: 80 MMHG | HEART RATE: 84 BPM | WEIGHT: 194 LBS

## 2018-12-01 LAB
CANNABINOIDS CONF, URINE: >500 NG/ML
METER GLUCOSE: 118 MG/DL (ref 74–99)

## 2018-12-01 PROCEDURE — 82962 GLUCOSE BLOOD TEST: CPT

## 2018-12-01 PROCEDURE — 6370000000 HC RX 637 (ALT 250 FOR IP): Performed by: OBSTETRICS & GYNECOLOGY

## 2018-12-01 PROCEDURE — 2580000003 HC RX 258: Performed by: OBSTETRICS & GYNECOLOGY

## 2018-12-01 PROCEDURE — 6360000002 HC RX W HCPCS: Performed by: OBSTETRICS & GYNECOLOGY

## 2018-12-01 RX ORDER — PSEUDOEPHEDRINE HCL 30 MG
100 TABLET ORAL 2 TIMES DAILY
Qty: 60 CAPSULE | Refills: 1 | Status: SHIPPED | OUTPATIENT
Start: 2018-12-01 | End: 2018-12-31

## 2018-12-01 RX ORDER — IBUPROFEN 800 MG/1
800 TABLET ORAL EVERY 8 HOURS PRN
Qty: 20 TABLET | Refills: 1 | OUTPATIENT
Start: 2018-12-01 | End: 2018-12-03 | Stop reason: ALTCHOICE

## 2018-12-01 RX ORDER — OXYCODONE HYDROCHLORIDE AND ACETAMINOPHEN 5; 325 MG/1; MG/1
1 TABLET ORAL EVERY 6 HOURS PRN
Qty: 20 TABLET | Refills: 0 | Status: SHIPPED | OUTPATIENT
Start: 2018-12-01 | End: 2018-12-08

## 2018-12-01 RX ADMIN — ONDANSETRON 4 MG: 2 INJECTION INTRAMUSCULAR; INTRAVENOUS at 02:52

## 2018-12-01 RX ADMIN — SODIUM CHLORIDE, POTASSIUM CHLORIDE, SODIUM LACTATE AND CALCIUM CHLORIDE: 600; 310; 30; 20 INJECTION, SOLUTION INTRAVENOUS at 05:44

## 2018-12-01 RX ADMIN — MORPHINE SULFATE 2 MG: 2 INJECTION, SOLUTION INTRAMUSCULAR; INTRAVENOUS at 01:54

## 2018-12-01 RX ADMIN — METFORMIN HYDROCHLORIDE 500 MG: 500 TABLET ORAL at 08:12

## 2018-12-01 RX ADMIN — DOCUSATE SODIUM 100 MG: 100 CAPSULE, LIQUID FILLED ORAL at 08:12

## 2018-12-01 RX ADMIN — OXYCODONE AND ACETAMINOPHEN 1 TABLET: 5; 325 TABLET ORAL at 10:15

## 2018-12-01 ASSESSMENT — PAIN SCALES - GENERAL
PAINLEVEL_OUTOF10: 9
PAINLEVEL_OUTOF10: 0
PAINLEVEL_OUTOF10: 3
PAINLEVEL_OUTOF10: 5

## 2018-12-01 ASSESSMENT — PAIN DESCRIPTION - LOCATION: LOCATION: ABDOMEN

## 2018-12-01 ASSESSMENT — PAIN DESCRIPTION - PAIN TYPE: TYPE: SURGICAL PAIN

## 2018-12-01 ASSESSMENT — PAIN DESCRIPTION - DESCRIPTORS: DESCRIPTORS: ACHING;DISCOMFORT

## 2018-12-03 ENCOUNTER — HOSPITAL ENCOUNTER (EMERGENCY)
Age: 43
Discharge: HOME | End: 2018-12-03
Payer: MEDICAID

## 2018-12-03 ENCOUNTER — APPOINTMENT (OUTPATIENT)
Dept: CT IMAGING | Age: 43
End: 2018-12-03
Payer: MEDICAID

## 2018-12-03 VITALS
BODY MASS INDEX: 32.99 KG/M2 | SYSTOLIC BLOOD PRESSURE: 160 MMHG | HEART RATE: 79 BPM | OXYGEN SATURATION: 99 % | HEIGHT: 65 IN | RESPIRATION RATE: 16 BRPM | TEMPERATURE: 98.4 F | DIASTOLIC BLOOD PRESSURE: 90 MMHG | WEIGHT: 198 LBS

## 2018-12-03 DIAGNOSIS — Z51.89 VISIT FOR WOUND CHECK: Primary | ICD-10-CM

## 2018-12-03 LAB
ALBUMIN SERPL-MCNC: 4.1 G/DL (ref 3.5–5.2)
ALP BLD-CCNC: 69 U/L (ref 35–104)
ALT SERPL-CCNC: 13 U/L (ref 0–32)
ANION GAP SERPL CALCULATED.3IONS-SCNC: 13 MMOL/L (ref 7–16)
AST SERPL-CCNC: 16 U/L (ref 0–31)
BASOPHILS ABSOLUTE: 0.03 E9/L (ref 0–0.2)
BASOPHILS RELATIVE PERCENT: 0.3 % (ref 0–2)
BILIRUB SERPL-MCNC: 0.8 MG/DL (ref 0–1.2)
BUN BLDV-MCNC: 9 MG/DL (ref 6–20)
CALCIUM SERPL-MCNC: 8.8 MG/DL (ref 8.6–10.2)
CHLORIDE BLD-SCNC: 103 MMOL/L (ref 98–107)
CO2: 21 MMOL/L (ref 22–29)
CREAT SERPL-MCNC: 0.6 MG/DL (ref 0.5–1)
EOSINOPHILS ABSOLUTE: 0.16 E9/L (ref 0.05–0.5)
EOSINOPHILS RELATIVE PERCENT: 1.3 % (ref 0–6)
GFR AFRICAN AMERICAN: >60
GFR NON-AFRICAN AMERICAN: >60 ML/MIN/1.73
GLUCOSE BLD-MCNC: 95 MG/DL (ref 74–99)
HCT VFR BLD CALC: 35.2 % (ref 34–48)
HEMOGLOBIN: 12 G/DL (ref 11.5–15.5)
IMMATURE GRANULOCYTES #: 0.07 E9/L
IMMATURE GRANULOCYTES %: 0.6 % (ref 0–5)
LYMPHOCYTES ABSOLUTE: 3.83 E9/L (ref 1.5–4)
LYMPHOCYTES RELATIVE PERCENT: 32.2 % (ref 20–42)
MCH RBC QN AUTO: 26.2 PG (ref 26–35)
MCHC RBC AUTO-ENTMCNC: 34.1 % (ref 32–34.5)
MCV RBC AUTO: 76.9 FL (ref 80–99.9)
MONOCYTES ABSOLUTE: 0.75 E9/L (ref 0.1–0.95)
MONOCYTES RELATIVE PERCENT: 6.3 % (ref 2–12)
NEUTROPHILS ABSOLUTE: 7.04 E9/L (ref 1.8–7.3)
NEUTROPHILS RELATIVE PERCENT: 59.3 % (ref 43–80)
PDW BLD-RTO: 12.4 FL (ref 11.5–15)
PLATELET # BLD: 283 E9/L (ref 130–450)
PMV BLD AUTO: 9.3 FL (ref 7–12)
POTASSIUM SERPL-SCNC: 3.6 MMOL/L (ref 3.5–5)
RBC # BLD: 4.58 E12/L (ref 3.5–5.5)
SODIUM BLD-SCNC: 137 MMOL/L (ref 132–146)
TOTAL PROTEIN: 7.4 G/DL (ref 6.4–8.3)
WBC # BLD: 11.9 E9/L (ref 4.5–11.5)

## 2018-12-03 PROCEDURE — 85025 COMPLETE CBC W/AUTO DIFF WBC: CPT

## 2018-12-03 PROCEDURE — 2580000003 HC RX 258: Performed by: NURSE PRACTITIONER

## 2018-12-03 PROCEDURE — 6360000004 HC RX CONTRAST MEDICATION: Performed by: RADIOLOGY

## 2018-12-03 PROCEDURE — 99284 EMERGENCY DEPT VISIT MOD MDM: CPT

## 2018-12-03 PROCEDURE — 96375 TX/PRO/DX INJ NEW DRUG ADDON: CPT

## 2018-12-03 PROCEDURE — 74177 CT ABD & PELVIS W/CONTRAST: CPT

## 2018-12-03 PROCEDURE — 36415 COLL VENOUS BLD VENIPUNCTURE: CPT

## 2018-12-03 PROCEDURE — 96376 TX/PRO/DX INJ SAME DRUG ADON: CPT

## 2018-12-03 PROCEDURE — 6360000002 HC RX W HCPCS: Performed by: NURSE PRACTITIONER

## 2018-12-03 PROCEDURE — 80053 COMPREHEN METABOLIC PANEL: CPT

## 2018-12-03 PROCEDURE — 96374 THER/PROPH/DIAG INJ IV PUSH: CPT

## 2018-12-03 RX ORDER — ALBUTEROL SULFATE 90 UG/1
2 AEROSOL, METERED RESPIRATORY (INHALATION) EVERY 6 HOURS PRN
COMMUNITY
End: 2020-05-25

## 2018-12-03 RX ORDER — ONDANSETRON 2 MG/ML
4 INJECTION INTRAMUSCULAR; INTRAVENOUS ONCE
Status: COMPLETED | OUTPATIENT
Start: 2018-12-03 | End: 2018-12-03

## 2018-12-03 RX ORDER — MORPHINE SULFATE 4 MG/ML
4 INJECTION, SOLUTION INTRAMUSCULAR; INTRAVENOUS ONCE
Status: COMPLETED | OUTPATIENT
Start: 2018-12-03 | End: 2018-12-03

## 2018-12-03 RX ORDER — DIPHENHYDRAMINE HYDROCHLORIDE 50 MG/ML
25 INJECTION INTRAMUSCULAR; INTRAVENOUS ONCE
Status: COMPLETED | OUTPATIENT
Start: 2018-12-03 | End: 2018-12-03

## 2018-12-03 RX ORDER — 0.9 % SODIUM CHLORIDE 0.9 %
500 INTRAVENOUS SOLUTION INTRAVENOUS ONCE
Status: COMPLETED | OUTPATIENT
Start: 2018-12-03 | End: 2018-12-03

## 2018-12-03 RX ORDER — SODIUM CHLORIDE 0.9 % (FLUSH) 0.9 %
SYRINGE (ML) INJECTION
Status: DISCONTINUED
Start: 2018-12-03 | End: 2018-12-03 | Stop reason: HOSPADM

## 2018-12-03 RX ORDER — OXYCODONE HYDROCHLORIDE AND ACETAMINOPHEN 5; 325 MG/1; MG/1
2 TABLET ORAL ONCE
Status: DISCONTINUED | OUTPATIENT
Start: 2018-12-03 | End: 2018-12-03

## 2018-12-03 RX ADMIN — HYDROMORPHONE HYDROCHLORIDE 1 MG: 1 INJECTION, SOLUTION INTRAMUSCULAR; INTRAVENOUS; SUBCUTANEOUS at 12:16

## 2018-12-03 RX ADMIN — MORPHINE SULFATE 4 MG: 4 INJECTION INTRAVENOUS at 11:02

## 2018-12-03 RX ADMIN — SODIUM CHLORIDE 500 ML: 9 INJECTION, SOLUTION INTRAVENOUS at 11:02

## 2018-12-03 RX ADMIN — DIPHENHYDRAMINE HYDROCHLORIDE 25 MG: 50 INJECTION, SOLUTION INTRAMUSCULAR; INTRAVENOUS at 14:14

## 2018-12-03 RX ADMIN — ONDANSETRON 4 MG: 2 INJECTION INTRAMUSCULAR; INTRAVENOUS at 13:07

## 2018-12-03 RX ADMIN — ONDANSETRON 4 MG: 2 INJECTION INTRAMUSCULAR; INTRAVENOUS at 11:02

## 2018-12-03 RX ADMIN — IOPAMIDOL 80 ML: 755 INJECTION, SOLUTION INTRAVENOUS at 12:47

## 2018-12-03 ASSESSMENT — PAIN DESCRIPTION - ORIENTATION
ORIENTATION: LOWER
ORIENTATION: LOWER

## 2018-12-03 ASSESSMENT — PAIN SCALES - GENERAL
PAINLEVEL_OUTOF10: 10
PAINLEVEL_OUTOF10: 6

## 2018-12-03 ASSESSMENT — PAIN DESCRIPTION - PAIN TYPE
TYPE: ACUTE PAIN
TYPE: ACUTE PAIN

## 2018-12-03 ASSESSMENT — PAIN DESCRIPTION - FREQUENCY
FREQUENCY: CONTINUOUS
FREQUENCY: CONTINUOUS

## 2018-12-03 ASSESSMENT — PAIN DESCRIPTION - DESCRIPTORS
DESCRIPTORS: ACHING
DESCRIPTORS: ACHING;SHARP;SPASM

## 2018-12-03 ASSESSMENT — PAIN DESCRIPTION - LOCATION
LOCATION: ABDOMEN
LOCATION: ABDOMEN

## 2018-12-03 NOTE — ED PROVIDER NOTES
reviewed. ------------------------------ ED COURSE/MEDICAL DECISION MAKING----------------------  Medications   0.9 % sodium chloride bolus (0 mLs Intravenous Stopped 12/3/18 1140)   ondansetron (ZOFRAN) injection 4 mg (4 mg Intravenous Given 12/3/18 1102)   morphine (PF) injection 4 mg (4 mg Intravenous Given 12/3/18 1102)   HYDROmorphone (DILAUDID) injection 1 mg (1 mg Intravenous Given 12/3/18 1216)   iopamidol (ISOVUE-370) 76 % injection 80 mL (80 mLs Intravenous Given 12/3/18 1247)   ondansetron (ZOFRAN) injection 4 mg (4 mg Intravenous Given 12/3/18 1307)   diphenhydrAMINE (BENADRYL) injection 25 mg (25 mg Intravenous Given 12/3/18 1414)             Medical Decision Makin- call to Dr. Sanjay Medina, wants a CT scan of the abd and pelvis   155-  Call back to Dr. Sanjay Medina, updated on patient's CAT scan results advised to place Steri-Strips and have the patient call his office tomorrow morning for follow-up. This plan was discussed with the patient at length verbalized understanding. She does have pain medication at home. Abdominal binder remains intact and Steri-Strips were applied. Re-Evaluations:             Re-evaluation. Patients symptoms are improving      Consultations:                 Critical Care: This patient's ED course included: a personal history and physicial examination, re-evaluation prior to disposition, multiple bedside re-evaluations and a personal history and physicial eaxmination    This patient has remained hemodynamically stable and improved during their ED course. Counseling: The emergency provider has spoken with the patient and discussed todays results, in addition to providing specific details for the plan of care and counseling regarding the diagnosis and prognosis.   Questions are answered at this time and they are agreeable with the plan.       --------------------------------- IMPRESSION AND DISPOSITION ---------------------------------    IMPRESSION  1.

## 2018-12-10 NOTE — OP NOTE
DATE OF PROCEDURE: 11/28/2018    SURGEON: LUIS FERNANDO Graham M.D.     Claribel Dozier:      PREOPERATIVE DIAGNOSIS:  Pelvic pain, metrorrhagia and cervical dysplasia    POSTOPERATIVE DIAGNOSES:  Same    OPERATION: Total abdominal hysterectomy. ANESTHESIA: General.     ESTIMATED BLOOD LOSS: 50 ml    COMPLICATIONS: None    PROCEDURE: The patient was brought to the operating room where she was placed in the supine position, given general anesthesia, prepared and draped in the usual sterile fashion. A subumbilical midline incision was made at the site of her previous incision, extending from the umbilicus to the level of the symphysis pubis. The incision was then extended sharply to the rectus fascia. The rectus fascia was then incised in the midline and extended upwards and downwards with curved Randall scissors. The recti muscles were then  in the midline by blunt dissection. The peritoneum was then grasped between 2 pickups, elevated, and entered sharply with a scalpel. The pelvis was examined and there were no significant peritoneal adhesions. The bladder was adherent to the anterior wall of the uterus. The right ovary was enlarged and polycystic with the largest cyst about 3 cm in diameter. The left ovary was previously removed. In view of the absence of the left ovary and the polycystic nature of the right ovary rather than a larger complex cyst, it was decided to conserve the right ovary. An O'Francisco-O'Lima was placed into the incision, and the bowel was packed away with moist laparotomy sponges. Two long Bhutanese clamps were then placed on the cornu and used for retraction. The round ligaments on both sides were clamped with the LigaSure, coagulated, and transected. The anterior leaf of the broad ligament was then incised along the bladder reflection to the midline from both sides. The bladder was then gently dissected off the lower uterine segment and the cervix with a sponge stick.  The LigaSure clamp was then

## 2019-06-04 ENCOUNTER — APPOINTMENT (OUTPATIENT)
Dept: GENERAL RADIOLOGY | Age: 44
End: 2019-06-04
Payer: MEDICAID

## 2019-06-04 ENCOUNTER — HOSPITAL ENCOUNTER (EMERGENCY)
Age: 44
Discharge: HOME | End: 2019-06-04
Payer: MEDICAID

## 2019-06-04 VITALS
HEART RATE: 99 BPM | SYSTOLIC BLOOD PRESSURE: 164 MMHG | WEIGHT: 198 LBS | RESPIRATION RATE: 24 BRPM | DIASTOLIC BLOOD PRESSURE: 109 MMHG | OXYGEN SATURATION: 99 % | BODY MASS INDEX: 32.95 KG/M2 | TEMPERATURE: 98.5 F

## 2019-06-04 DIAGNOSIS — S99.922A INJURY OF TOENAIL OF LEFT FOOT, INITIAL ENCOUNTER: Primary | ICD-10-CM

## 2019-06-04 PROCEDURE — 90714 TD VACC NO PRESV 7 YRS+ IM: CPT | Performed by: EMERGENCY MEDICINE

## 2019-06-04 PROCEDURE — 6360000002 HC RX W HCPCS: Performed by: EMERGENCY MEDICINE

## 2019-06-04 PROCEDURE — 90471 IMMUNIZATION ADMIN: CPT | Performed by: EMERGENCY MEDICINE

## 2019-06-04 PROCEDURE — 99283 EMERGENCY DEPT VISIT LOW MDM: CPT

## 2019-06-04 PROCEDURE — 6370000000 HC RX 637 (ALT 250 FOR IP): Performed by: EMERGENCY MEDICINE

## 2019-06-04 PROCEDURE — 73630 X-RAY EXAM OF FOOT: CPT

## 2019-06-04 RX ORDER — IBUPROFEN 400 MG/1
400 TABLET ORAL EVERY 6 HOURS PRN
Qty: 120 TABLET | Refills: 0 | Status: SHIPPED | OUTPATIENT
Start: 2019-06-04 | End: 2020-05-25

## 2019-06-04 RX ORDER — TETANUS AND DIPHTHERIA TOXOIDS ADSORBED 2; 2 [LF]/.5ML; [LF]/.5ML
0.5 INJECTION INTRAMUSCULAR ONCE
Status: COMPLETED | OUTPATIENT
Start: 2019-06-04 | End: 2019-06-04

## 2019-06-04 RX ORDER — IBUPROFEN 400 MG/1
400 TABLET ORAL ONCE
Status: COMPLETED | OUTPATIENT
Start: 2019-06-04 | End: 2019-06-04

## 2019-06-04 RX ADMIN — IBUPROFEN 400 MG: 400 TABLET, FILM COATED ORAL at 08:46

## 2019-06-04 RX ADMIN — TETANUS AND DIPHTHERIA TOXOIDS ADSORBED 0.5 ML: 2; 2 INJECTION INTRAMUSCULAR at 08:52

## 2019-06-04 ASSESSMENT — PAIN DESCRIPTION - ORIENTATION: ORIENTATION: LEFT

## 2019-06-04 ASSESSMENT — PAIN DESCRIPTION - FREQUENCY: FREQUENCY: CONTINUOUS

## 2019-06-04 ASSESSMENT — PAIN DESCRIPTION - DESCRIPTORS: DESCRIPTORS: PATIENT UNABLE TO DESCRIBE

## 2019-06-04 ASSESSMENT — PAIN DESCRIPTION - PAIN TYPE: TYPE: ACUTE PAIN

## 2019-06-04 ASSESSMENT — PAIN DESCRIPTION - ONSET: ONSET: SUDDEN

## 2019-06-04 ASSESSMENT — PAIN SCALES - GENERAL
PAINLEVEL_OUTOF10: 8
PAINLEVEL_OUTOF10: 10

## 2019-06-04 ASSESSMENT — PAIN DESCRIPTION - PROGRESSION: CLINICAL_PROGRESSION: NOT CHANGED

## 2019-06-04 ASSESSMENT — PAIN DESCRIPTION - LOCATION: LOCATION: FOOT

## 2019-06-04 ASSESSMENT — PAIN - FUNCTIONAL ASSESSMENT: PAIN_FUNCTIONAL_ASSESSMENT: 0-10

## 2019-06-04 NOTE — ED PROVIDER NOTES
HPI:  19, Time: 8:23 AM        68-year-old female presenting with a left toe injury. She states she is in shower just prior to arrival when shaving cream can fell from the shelf and landed on her foot. She complains of severe pain throughout the foot and localizes to her middle toe left foot. She is ambulatory. Review of Systems:   Pertinent positives and negatives are stated within HPI, all other systems reviewed and are negative.          --------------------------------------------- PAST HISTORY ---------------------------------------------  Past Medical History:  has a past medical history of Abnormal Pap smear, Asthma, Diabetes mellitus ([facility] [address] Dizziness, Headache, Hypertension, Memory difficulties, and Vertigo. Past Surgical History:  has a past surgical history that includes  section; Tubal ligation (); pelvic laparoscopy (); Dilation & curettage (11); hysteroscopy (11); Endometrial ablation (11); Tonsillectomy; LEEP (2012); Endometrial biopsy; and pr total abdom hysterectomy (N/A, 2018). Social History:  reports that she has been smoking. She has never used smokeless tobacco. She reports that she drinks about 0.6 oz of alcohol per week. She reports that she has current or past drug history. Drug: Marijuana. Family History: family history includes Breast Cancer in her maternal aunt; Cancer in her father; Diabetes in her brother; Hypertension in her father and mother; Lupus in her mother; Sickle Cell Anemia in her daughter; Sickle Cell Trait in her daughter. The patients home medications have been reviewed. Allergies: Aspirin    -------------------------------------------------- RESULTS -------------------------------------------------  All laboratory and radiology results have been personally reviewed by myself   LABS:  No results found for this visit on 19.     RADIOLOGY:  Interpreted by Radiologist.  XR FOOT LEFT (MIN 3 VIEWS)    (Results Pending)       ------------------------- NURSING NOTES AND VITALS REVIEWED ---------------------------   The nursing notes within the ED encounter and vital signs as below have been reviewed. BP (!) 164/109   Pulse 99   Temp 98.5 °F (36.9 °C) (Oral)   Resp 24   Wt 198 lb (89.8 kg)   LMP 2018   SpO2 99%   BMI 32.95 kg/m²   Oxygen Saturation Interpretation: Normal      ---------------------------------------------------PHYSICAL EXAM--------------------------------------      Constitutional/General: Alert and oriented x3, well appearing, non toxic in NAD  Head: Normocephalic and atraumatic  Eyes: PERRL, EOMI  Mouth: Oropharynx clear, handling secretions, no trismus  Neck: Supple, full ROM,   Pulmonary: Lungs clear to auscultation bilaterally, no wheezes, rales, or rhonchi. Not in respiratory distress  Cardiovascular:  Regular rate and rhythm, no murmurs, gallops, or rubs. 2+ distal pulses  Abdomen: Soft, non tender, non distended,   Extremities: Moves all extremities x 4. Warm and well perfused. Mild dark red bleeding 3rd toe of the left foot, particularly around the toenail. This was cleaned at bedside. No further bleeding was noted at that time. Patient had tenderness around the PIP and DIP of the digit. Motor function and sensory function are both intact. Skin: warm and dry without rash  Neurologic: GCS 15,  Psych: Normal Affect      ------------------------------ ED COURSE/MEDICAL DECISION MAKING----------------------  Medications   ibuprofen (ADVIL;MOTRIN) tablet 400 mg (has no administration in time range)   diptheria-tetanus toxoids (DECAVAC) 2-2 LF/0.5ML injection 0.5 mL (has no administration in time range)         ED COURSE:      8:46 AM    Live fever. No evidence of acute fracture. Patient's tetanus status will be updated. Anti-inflammatory given toes will be marilee taped together for comfort. She'll be given discharge instructions.     Medical Decision Makin-year-old female presenting with a left 3rd toe injury after can shaving cream fell out of the shower. Physical exam showed some bleeding around the toenail but no disruption of the nailbed. X-ray reveals no acute fracture. Patient treated symptomatically and recommended to follow-up with her PCP for further evaluation and management as needed. Counseling: The emergency provider has spoken with the patient and discussed todays results, in addition to providing specific details for the plan of care and counseling regarding the diagnosis and prognosis. Questions are answered at this time and they are agreeable with the plan.      --------------------------------- IMPRESSION AND DISPOSITION ---------------------------------    IMPRESSION  1. Injury of toenail of left foot, initial encounter        DISPOSITION  Disposition: Discharge to home  Patient condition is stable      NOTE: This report was transcribed using voice recognition software.  Every effort was made to ensure accuracy; however, inadvertent computerized transcription errors may be present             Bonifacio Ruelas DO  Resident  06/04/19 9096

## 2019-06-04 NOTE — ED NOTES
Neosporin Ointment, Telfa and Judy to middle toe and gauze wrap to toes on both sides. Instructed on home wound care/dressing changes.      Long Quintanilla RN  06/04/19 8870

## 2019-06-04 NOTE — ED NOTES
Wound cleaned with wound cleanser. Gauze pad to wound pending Xray. Ice bag provided.      Yoko Lozada, EFREN  06/04/19 [address] [place] Juancho, EFREN  06/04/19 3965

## 2019-06-29 ENCOUNTER — HOSPITAL ENCOUNTER (EMERGENCY)
Age: 44
Discharge: HOME | End: 2019-06-29
Attending: EMERGENCY MEDICINE
Payer: MEDICAID

## 2019-06-29 VITALS
TEMPERATURE: 97.5 F | BODY MASS INDEX: 32.99 KG/M2 | OXYGEN SATURATION: 98 % | SYSTOLIC BLOOD PRESSURE: 141 MMHG | RESPIRATION RATE: 16 BRPM | DIASTOLIC BLOOD PRESSURE: 99 MMHG | HEIGHT: 65 IN | HEART RATE: 77 BPM | WEIGHT: 198 LBS

## 2019-06-29 DIAGNOSIS — R11.2 NON-INTRACTABLE VOMITING WITH NAUSEA, UNSPECIFIED VOMITING TYPE: Primary | ICD-10-CM

## 2019-06-29 LAB
ALBUMIN SERPL-MCNC: 4.1 G/DL (ref 3.5–5.2)
ALP BLD-CCNC: 63 U/L (ref 35–104)
ALT SERPL-CCNC: 18 U/L (ref 0–32)
ANION GAP SERPL CALCULATED.3IONS-SCNC: 8 MMOL/L (ref 7–16)
AST SERPL-CCNC: 27 U/L (ref 0–31)
BASOPHILS ABSOLUTE: 0.05 E9/L (ref 0–0.2)
BASOPHILS RELATIVE PERCENT: 0.4 % (ref 0–2)
BILIRUB SERPL-MCNC: 0.6 MG/DL (ref 0–1.2)
BILIRUBIN URINE: NEGATIVE
BLOOD, URINE: NEGATIVE
BUN BLDV-MCNC: 7 MG/DL (ref 6–20)
CALCIUM SERPL-MCNC: 8.1 MG/DL (ref 8.6–10.2)
CHLORIDE BLD-SCNC: 110 MMOL/L (ref 98–107)
CLARITY: CLEAR
CO2: 22 MMOL/L (ref 22–29)
COLOR: YELLOW
CREAT SERPL-MCNC: 0.6 MG/DL (ref 0.5–1)
EOSINOPHILS ABSOLUTE: 0.02 E9/L (ref 0.05–0.5)
EOSINOPHILS RELATIVE PERCENT: 0.1 % (ref 0–6)
ETHANOL: <10 MG/DL (ref 0–0.08)
GFR AFRICAN AMERICAN: >60
GFR NON-AFRICAN AMERICAN: >60 ML/MIN/1.73
GLUCOSE BLD-MCNC: 130 MG/DL (ref 74–99)
GLUCOSE URINE: NEGATIVE MG/DL
HCT VFR BLD CALC: 40 % (ref 34–48)
HEMOGLOBIN: 13.7 G/DL (ref 11.5–15.5)
IMMATURE GRANULOCYTES #: 0.06 E9/L
IMMATURE GRANULOCYTES %: 0.4 % (ref 0–5)
KETONES, URINE: NEGATIVE MG/DL
LEUKOCYTE ESTERASE, URINE: NEGATIVE
LIPASE: 13 U/L (ref 13–60)
LYMPHOCYTES ABSOLUTE: 2.6 E9/L (ref 1.5–4)
LYMPHOCYTES RELATIVE PERCENT: 18.4 % (ref 20–42)
MCH RBC QN AUTO: 26.6 PG (ref 26–35)
MCHC RBC AUTO-ENTMCNC: 34.3 % (ref 32–34.5)
MCV RBC AUTO: 77.5 FL (ref 80–99.9)
MONOCYTES ABSOLUTE: 0.5 E9/L (ref 0.1–0.95)
MONOCYTES RELATIVE PERCENT: 3.5 % (ref 2–12)
NEUTROPHILS ABSOLUTE: 10.91 E9/L (ref 1.8–7.3)
NEUTROPHILS RELATIVE PERCENT: 77.2 % (ref 43–80)
NITRITE, URINE: NEGATIVE
PDW BLD-RTO: 13.5 FL (ref 11.5–15)
PH UA: 8 (ref 5–9)
PLATELET # BLD: 265 E9/L (ref 130–450)
PMV BLD AUTO: 9.2 FL (ref 7–12)
POTASSIUM REFLEX MAGNESIUM: 6.8 MMOL/L (ref 3.5–5)
POTASSIUM SERPL-SCNC: 3.9 MMOL/L (ref 3.5–5)
PROTEIN UA: NEGATIVE MG/DL
RBC # BLD: 5.16 E12/L (ref 3.5–5.5)
REASON FOR REJECTION: NORMAL
REJECTED TEST: NORMAL
SODIUM BLD-SCNC: 140 MMOL/L (ref 132–146)
SPECIFIC GRAVITY UA: 1.01 (ref 1–1.03)
TOTAL PROTEIN: 7.5 G/DL (ref 6.4–8.3)
UROBILINOGEN, URINE: 0.2 E.U./DL
WBC # BLD: 14.1 E9/L (ref 4.5–11.5)

## 2019-06-29 PROCEDURE — 81003 URINALYSIS AUTO W/O SCOPE: CPT

## 2019-06-29 PROCEDURE — 96374 THER/PROPH/DIAG INJ IV PUSH: CPT

## 2019-06-29 PROCEDURE — 36415 COLL VENOUS BLD VENIPUNCTURE: CPT

## 2019-06-29 PROCEDURE — 80053 COMPREHEN METABOLIC PANEL: CPT

## 2019-06-29 PROCEDURE — G0480 DRUG TEST DEF 1-7 CLASSES: HCPCS

## 2019-06-29 PROCEDURE — 84132 ASSAY OF SERUM POTASSIUM: CPT

## 2019-06-29 PROCEDURE — 2580000003 HC RX 258: Performed by: EMERGENCY MEDICINE

## 2019-06-29 PROCEDURE — 85025 COMPLETE CBC W/AUTO DIFF WBC: CPT

## 2019-06-29 PROCEDURE — 6360000002 HC RX W HCPCS: Performed by: EMERGENCY MEDICINE

## 2019-06-29 PROCEDURE — 83690 ASSAY OF LIPASE: CPT

## 2019-06-29 PROCEDURE — 99284 EMERGENCY DEPT VISIT MOD MDM: CPT

## 2019-06-29 RX ORDER — 0.9 % SODIUM CHLORIDE 0.9 %
1000 INTRAVENOUS SOLUTION INTRAVENOUS ONCE
Status: COMPLETED | OUTPATIENT
Start: 2019-06-29 | End: 2019-06-29

## 2019-06-29 RX ORDER — ONDANSETRON 2 MG/ML
4 INJECTION INTRAMUSCULAR; INTRAVENOUS ONCE
Status: COMPLETED | OUTPATIENT
Start: 2019-06-29 | End: 2019-06-29

## 2019-06-29 RX ORDER — PROMETHAZINE HYDROCHLORIDE 25 MG/ML
6.25 INJECTION, SOLUTION INTRAMUSCULAR; INTRAVENOUS ONCE
Status: DISCONTINUED | OUTPATIENT
Start: 2019-06-29 | End: 2019-06-29 | Stop reason: HOSPADM

## 2019-06-29 RX ORDER — ONDANSETRON 4 MG/1
4 TABLET, ORALLY DISINTEGRATING ORAL 3 TIMES DAILY PRN
Qty: 21 TABLET | Refills: 0 | Status: SHIPPED | OUTPATIENT
Start: 2019-06-29 | End: 2020-05-25

## 2019-06-29 RX ADMIN — ONDANSETRON 4 MG: 2 INJECTION INTRAMUSCULAR; INTRAVENOUS at 15:35

## 2019-06-29 RX ADMIN — SODIUM CHLORIDE 1000 ML: 900 INJECTION, SOLUTION INTRAVENOUS at 17:45

## 2019-06-29 RX ADMIN — SODIUM CHLORIDE 1000 ML: 900 INJECTION, SOLUTION INTRAVENOUS at 15:35

## 2019-06-29 NOTE — ED PROVIDER NOTES
mmol/L    Anion Gap 8 7 - 16 mmol/L    Glucose 130 (H) 74 - 99 mg/dL    BUN 7 6 - 20 mg/dL    CREATININE 0.6 0.5 - 1.0 mg/dL    GFR Non-African American >60 >=60 mL/min/1.73    GFR African American >60     Calcium 8.1 (L) 8.6 - 10.2 mg/dL    Total Protein 7.5 6.4 - 8.3 g/dL    Alb 4.1 3.5 - 5.2 g/dL    Total Bilirubin 0.6 0.0 - 1.2 mg/dL    Alkaline Phosphatase 63 35 - 104 U/L    ALT 18 0 - 32 U/L    AST 27 0 - 31 U/L   SPECIMEN REJECTION   Result Value Ref Range    Rejected Test CMPX     Reason for Rejection see below    Potassium   Result Value Ref Range    Potassium 3.9 3.5 - 5.0 mmol/L       RADIOLOGY:  Interpreted by Radiologist.  No orders to display       ------------------------- NURSING NOTES AND VITALS REVIEWED ---------------------------   The nursing notes within the ED encounter and vital signs as below have been reviewed. BP (!) 141/99   Pulse 77   Temp 97.5 °F (36.4 °C) (Oral)   Resp 16   Ht 5' 5\" (1.651 m)   Wt 198 lb (89.8 kg)   LMP 07/01/2018   SpO2 98%   BMI 32.95 kg/m²   Oxygen Saturation Interpretation: Normal      ---------------------------------------------------PHYSICAL EXAM--------------------------------------    Constitutional/General: Alert and oriented x3, well appearing, non toxic in NAD  Head: NC/AT  Eyes: PERRL, EOMI  Mouth: Oropharynx clear, handling secretions, no trismus  Neck: Supple, full ROM, no meningeal signs  Pulmonary: Lungs clear to auscultation bilaterally, no wheezes, rales, or rhonchi. Not in respiratory distress  Cardiovascular:  Regular rate and rhythm, no murmurs, gallops, or rubs. 2+ distal pulses  Abdomen: Soft, non tender, non distended,   Extremities: Moves all extremities x 4.  Warm and well perfused  Skin: warm and dry without rash  Neurologic: GCS 15,  Psych: Normal Affect      ------------------------------ ED COURSE/MEDICAL DECISION MAKING----------------------  Medications   promethazine (PHENERGAN) injection 6.25 mg (6.25 mg Intramuscular Not

## 2020-05-25 ENCOUNTER — HOSPITAL ENCOUNTER (EMERGENCY)
Age: 45
Discharge: HOME | End: 2020-05-25
Attending: EMERGENCY MEDICINE
Payer: MEDICAID

## 2020-05-25 ENCOUNTER — APPOINTMENT (OUTPATIENT)
Dept: GENERAL RADIOLOGY | Age: 45
End: 2020-05-25
Payer: MEDICAID

## 2020-05-25 VITALS
HEART RATE: 76 BPM | RESPIRATION RATE: 16 BRPM | TEMPERATURE: 98.8 F | HEIGHT: 65 IN | OXYGEN SATURATION: 98 % | WEIGHT: 198 LBS | SYSTOLIC BLOOD PRESSURE: 130 MMHG | BODY MASS INDEX: 32.99 KG/M2 | DIASTOLIC BLOOD PRESSURE: 79 MMHG

## 2020-05-25 PROCEDURE — 2500000003 HC RX 250 WO HCPCS: Performed by: EMERGENCY MEDICINE

## 2020-05-25 PROCEDURE — 12001 RPR S/N/AX/GEN/TRNK 2.5CM/<: CPT

## 2020-05-25 PROCEDURE — 6370000000 HC RX 637 (ALT 250 FOR IP): Performed by: EMERGENCY MEDICINE

## 2020-05-25 PROCEDURE — G0382 LEV 3 HOSP TYPE B ED VISIT: HCPCS

## 2020-05-25 PROCEDURE — 73130 X-RAY EXAM OF HAND: CPT

## 2020-05-25 RX ORDER — ACETAMINOPHEN 500 MG
1000 TABLET ORAL ONCE
Status: COMPLETED | OUTPATIENT
Start: 2020-05-25 | End: 2020-05-25

## 2020-05-25 RX ORDER — LIDOCAINE HYDROCHLORIDE 10 MG/ML
5 INJECTION, SOLUTION INFILTRATION; PERINEURAL ONCE
Status: COMPLETED | OUTPATIENT
Start: 2020-05-25 | End: 2020-05-25

## 2020-05-25 RX ADMIN — ACETAMINOPHEN 1000 MG: 500 TABLET, FILM COATED ORAL at 10:03

## 2020-05-25 RX ADMIN — LIDOCAINE HYDROCHLORIDE 5 ML: 10 INJECTION, SOLUTION INFILTRATION; PERINEURAL at 09:36

## 2020-05-25 ASSESSMENT — PAIN DESCRIPTION - PROGRESSION: CLINICAL_PROGRESSION: NOT CHANGED

## 2020-05-25 ASSESSMENT — PAIN SCALES - GENERAL
PAINLEVEL_OUTOF10: 4

## 2020-05-25 ASSESSMENT — PAIN DESCRIPTION - DESCRIPTORS: DESCRIPTORS: THROBBING

## 2020-05-25 ASSESSMENT — PAIN DESCRIPTION - ORIENTATION: ORIENTATION: RIGHT

## 2020-05-25 ASSESSMENT — PAIN DESCRIPTION - ONSET: ONSET: SUDDEN

## 2020-05-25 ASSESSMENT — PAIN DESCRIPTION - LOCATION: LOCATION: HAND

## 2020-05-25 ASSESSMENT — PAIN DESCRIPTION - PAIN TYPE: TYPE: ACUTE PAIN

## 2020-05-25 ASSESSMENT — PAIN DESCRIPTION - FREQUENCY: FREQUENCY: CONTINUOUS

## 2021-03-30 ENCOUNTER — HOSPITAL ENCOUNTER (EMERGENCY)
Age: 46
Discharge: HOME | End: 2021-03-30
Attending: EMERGENCY MEDICINE
Payer: MEDICAID

## 2021-03-30 VITALS
TEMPERATURE: 97.1 F | HEART RATE: 92 BPM | OXYGEN SATURATION: 99 % | RESPIRATION RATE: 18 BRPM | SYSTOLIC BLOOD PRESSURE: 130 MMHG | BODY MASS INDEX: 31.78 KG/M2 | DIASTOLIC BLOOD PRESSURE: 73 MMHG | WEIGHT: 191 LBS

## 2021-03-30 DIAGNOSIS — L04.0 ACUTE CERVICAL ADENITIS: Primary | ICD-10-CM

## 2021-03-30 PROCEDURE — G0381 LEV 2 HOSP TYPE B ED VISIT: HCPCS

## 2021-03-30 RX ORDER — ACETAMINOPHEN 500 MG
1000 TABLET ORAL EVERY 6 HOURS PRN
Qty: 30 TABLET | Refills: 0 | Status: SHIPPED | OUTPATIENT
Start: 2021-03-30 | End: 2021-04-03 | Stop reason: ALTCHOICE

## 2021-03-30 RX ORDER — CEPHALEXIN 500 MG/1
500 CAPSULE ORAL 4 TIMES DAILY
Qty: 40 CAPSULE | Refills: 0 | Status: ON HOLD | OUTPATIENT
Start: 2021-03-30 | End: 2021-04-05 | Stop reason: HOSPADM

## 2021-03-30 ASSESSMENT — ENCOUNTER SYMPTOMS
EYE DISCHARGE: 0
SORE THROAT: 0
DIARRHEA: 0
COUGH: 0
EYE PAIN: 0
SINUS PRESSURE: 0
BACK PAIN: 0
VOMITING: 0
EYE REDNESS: 0
NAUSEA: 0
WHEEZING: 0
ABDOMINAL DISTENTION: 0
SHORTNESS OF BREATH: 0

## 2021-03-30 NOTE — ED PROVIDER NOTES
Anterior neck lumps after recent dental procedure;  Delayed dental procedure for cracked tooth over 1 year from  [address] delay. The history is provided by the patient. Neck Pain  Quality:  Burning  Pain radiates to:  Does not radiate  Pain severity:  Mild  Onset quality:  Gradual  Timing:  Intermittent  Progression:  Worsening  Chronicity:  Recurrent  Relieved by:  Nothing  Worsened by:  Nothing  Ineffective treatments:  None tried  Associated symptoms: no chest pain, no fever, no headaches and no weakness         Review of Systems   Constitutional: Negative for chills and fever. HENT: Positive for dental problem. Negative for ear pain, sinus pressure and sore throat. Eyes: Negative for pain, discharge and redness. Respiratory: Negative for cough, shortness of breath and wheezing. Cardiovascular: Negative for chest pain. Gastrointestinal: Negative for abdominal distention, diarrhea, nausea and vomiting. Genitourinary: Negative for dysuria and frequency. Musculoskeletal: Positive for neck pain. Negative for arthralgias and back pain. Skin: Negative for rash and wound. Neurological: Negative for weakness and headaches. Hematological: Negative for adenopathy. Psychiatric/Behavioral: Negative. All other systems reviewed and are negative. Physical Exam  Vitals signs and nursing note reviewed. Constitutional:       Appearance: She is well-developed. HENT:      Head: Normocephalic and atraumatic. Eyes:      Pupils: Pupils are equal, round, and reactive to light. Neck:      Musculoskeletal: Normal range of motion and neck supple. Cardiovascular:      Rate and Rhythm: Normal rate and regular rhythm. Heart sounds: Normal heart sounds. No murmur. Pulmonary:      Effort: Pulmonary effort is normal.      Breath sounds: Normal breath sounds. Abdominal:      General: Bowel sounds are normal.      Palpations: Abdomen is soft. Tenderness:  There is no abdominal tenderness. There is no guarding or rebound. Lymphadenopathy:      Cervical: Cervical adenopathy present. Left cervical: Superficial cervical adenopathy present. Skin:     General: Skin is warm and dry. Neurological:      Mental Status: She is alert and oriented to person, place, and time. Psychiatric:         Behavior: Behavior normal.         Thought Content: Thought content normal.         Judgment: Judgment normal.        --------------------------------------------- PAST HISTORY ---------------------------------------------  Past Medical History:  has a past medical history of Abnormal Pap smear, Asthma, Diabetes mellitus ([facility] [address] Dizziness, Headache, Hypertension, Memory difficulties, and Vertigo. Past Surgical History:  has a past surgical history that includes  section; Tubal ligation (); pelvic laparoscopy (); Dilation & curettage (11); hysteroscopy (11); Endometrial ablation (11); Tonsillectomy; LEEP (2012); Endometrial biopsy; and pr total abdom hysterectomy (N/A, 2018). Social History:  reports that she has been smoking. She has never used smokeless tobacco. She reports current alcohol use of about 1.0 standard drinks of alcohol per week. She reports current drug use. Drug: Marijuana. Family History: family history includes Breast Cancer in her maternal aunt; Cancer in her father; Diabetes in her brother; Hypertension in her father and mother; Lupus in her mother; Sickle Cell Anemia in her daughter; Sickle Cell Trait in her daughter. The patients home medications have been reviewed. Allergies: Aspirin    -------------------------------------------------- RESULTS -------------------------------------------------  No results found for this visit on 21.   No orders to display       ------------------------- NURSING NOTES AND VITALS REVIEWED ---------------------------   The nursing notes within the ED encounter and vital signs as below

## 2021-04-03 ENCOUNTER — APPOINTMENT (OUTPATIENT)
Dept: CT IMAGING | Age: 46
End: 2021-04-03
Payer: MEDICAID

## 2021-04-03 ENCOUNTER — HOSPITAL ENCOUNTER (INPATIENT)
Age: 46
LOS: 2 days | Discharge: HOME | DRG: 115 | End: 2021-04-05
Attending: EMERGENCY MEDICINE | Admitting: EMERGENCY MEDICINE
Payer: MEDICAID

## 2021-04-03 ENCOUNTER — HOSPITAL ENCOUNTER (EMERGENCY)
Age: 46
Discharge: ANOTHER HOSPITAL | End: 2021-04-03
Attending: EMERGENCY MEDICINE
Payer: MEDICAID

## 2021-04-03 VITALS
OXYGEN SATURATION: 99 % | DIASTOLIC BLOOD PRESSURE: 94 MMHG | BODY MASS INDEX: 32.12 KG/M2 | RESPIRATION RATE: 18 BRPM | HEART RATE: 115 BPM | SYSTOLIC BLOOD PRESSURE: 172 MMHG | WEIGHT: 193 LBS | TEMPERATURE: 98.3 F

## 2021-04-03 DIAGNOSIS — K12.2 SUBMANDIBULAR ABSCESS: Primary | ICD-10-CM

## 2021-04-03 PROBLEM — K04.7 DENTAL ABSCESS: Status: ACTIVE | Noted: 2021-04-03

## 2021-04-03 LAB
ANION GAP SERPL CALCULATED.3IONS-SCNC: 12 MMOL/L (ref 7–16)
BASOPHILS ABSOLUTE: 0 E9/L (ref 0–0.2)
BASOPHILS RELATIVE PERCENT: 0.3 % (ref 0–2)
BUN BLDV-MCNC: 10 MG/DL (ref 6–20)
CALCIUM SERPL-MCNC: 9.1 MG/DL (ref 8.6–10.2)
CHLORIDE BLD-SCNC: 107 MMOL/L (ref 98–107)
CO2: 22 MMOL/L (ref 22–29)
CREAT SERPL-MCNC: 0.8 MG/DL (ref 0.5–1)
EOSINOPHILS ABSOLUTE: 0.15 E9/L (ref 0.05–0.5)
EOSINOPHILS RELATIVE PERCENT: 0.9 % (ref 0–6)
GFR AFRICAN AMERICAN: >60
GFR NON-AFRICAN AMERICAN: >60 ML/MIN/1.73
GLUCOSE BLD-MCNC: 123 MG/DL (ref 74–99)
HCT VFR BLD CALC: 37.2 % (ref 34–48)
HEMOGLOBIN: 12.7 G/DL (ref 11.5–15.5)
LACTIC ACID: 1.5 MMOL/L (ref 0.5–2.2)
LYMPHOCYTES ABSOLUTE: 5.68 E9/L (ref 1.5–4)
LYMPHOCYTES RELATIVE PERCENT: 32.5 % (ref 20–42)
MCH RBC QN AUTO: 27 PG (ref 26–35)
MCHC RBC AUTO-ENTMCNC: 34.1 % (ref 32–34.5)
MCV RBC AUTO: 79.1 FL (ref 80–99.9)
MONOCYTES ABSOLUTE: 1.2 E9/L (ref 0.1–0.95)
MONOCYTES RELATIVE PERCENT: 7 % (ref 2–12)
NEUTROPHILS ABSOLUTE: 10.32 E9/L (ref 1.8–7.3)
NEUTROPHILS RELATIVE PERCENT: 59.6 % (ref 43–80)
PDW BLD-RTO: 12.8 FL (ref 11.5–15)
PLATELET # BLD: 318 E9/L (ref 130–450)
PMV BLD AUTO: 9.1 FL (ref 7–12)
POIKILOCYTES: ABNORMAL
POLYCHROMASIA: ABNORMAL
POTASSIUM REFLEX MAGNESIUM: 3.6 MMOL/L (ref 3.5–5)
RBC # BLD: 4.7 E12/L (ref 3.5–5.5)
SODIUM BLD-SCNC: 141 MMOL/L (ref 132–146)
TARGET CELLS: ABNORMAL
WBC # BLD: 17.2 E9/L (ref 4.5–11.5)

## 2021-04-03 PROCEDURE — 70491 CT SOFT TISSUE NECK W/DYE: CPT

## 2021-04-03 PROCEDURE — 6360000004 HC RX CONTRAST MEDICATION: Performed by: RADIOLOGY

## 2021-04-03 PROCEDURE — 96365 THER/PROPH/DIAG IV INF INIT: CPT

## 2021-04-03 PROCEDURE — 85025 COMPLETE CBC W/AUTO DIFF WBC: CPT

## 2021-04-03 PROCEDURE — 99283 EMERGENCY DEPT VISIT LOW MDM: CPT

## 2021-04-03 PROCEDURE — 80048 BASIC METABOLIC PNL TOTAL CA: CPT

## 2021-04-03 PROCEDURE — 1200000000 HC SEMI PRIVATE

## 2021-04-03 PROCEDURE — G0379 DIRECT REFER HOSPITAL OBSERV: HCPCS

## 2021-04-03 PROCEDURE — 83605 ASSAY OF LACTIC ACID: CPT

## 2021-04-03 PROCEDURE — G0378 HOSPITAL OBSERVATION PER HR: HCPCS

## 2021-04-03 PROCEDURE — 96375 TX/PRO/DX INJ NEW DRUG ADDON: CPT

## 2021-04-03 PROCEDURE — 2580000003 HC RX 258: Performed by: EMERGENCY MEDICINE

## 2021-04-03 PROCEDURE — 6360000002 HC RX W HCPCS: Performed by: EMERGENCY MEDICINE

## 2021-04-03 RX ORDER — MORPHINE SULFATE 4 MG/ML
4 INJECTION, SOLUTION INTRAMUSCULAR; INTRAVENOUS ONCE
Status: COMPLETED | OUTPATIENT
Start: 2021-04-03 | End: 2021-04-03

## 2021-04-03 RX ORDER — 0.9 % SODIUM CHLORIDE 0.9 %
1000 INTRAVENOUS SOLUTION INTRAVENOUS ONCE
Status: COMPLETED | OUTPATIENT
Start: 2021-04-03 | End: 2021-04-03

## 2021-04-03 RX ORDER — DEXAMETHASONE SODIUM PHOSPHATE 10 MG/ML
10 INJECTION, SOLUTION INTRAMUSCULAR; INTRAVENOUS ONCE
Status: COMPLETED | OUTPATIENT
Start: 2021-04-03 | End: 2021-04-03

## 2021-04-03 RX ORDER — ONDANSETRON 2 MG/ML
4 INJECTION INTRAMUSCULAR; INTRAVENOUS ONCE
Status: COMPLETED | OUTPATIENT
Start: 2021-04-03 | End: 2021-04-03

## 2021-04-03 RX ORDER — ONDANSETRON 2 MG/ML
4 INJECTION INTRAMUSCULAR; INTRAVENOUS ONCE
Status: DISCONTINUED | OUTPATIENT
Start: 2021-04-03 | End: 2021-04-03 | Stop reason: HOSPADM

## 2021-04-03 RX ORDER — ONDANSETRON 2 MG/ML
INJECTION INTRAMUSCULAR; INTRAVENOUS
Status: DISCONTINUED
Start: 2021-04-03 | End: 2021-04-03 | Stop reason: HOSPADM

## 2021-04-03 RX ADMIN — ONDANSETRON 4 MG: 2 INJECTION INTRAMUSCULAR; INTRAVENOUS at 18:02

## 2021-04-03 RX ADMIN — SODIUM CHLORIDE 1000 ML: 9 INJECTION, SOLUTION INTRAVENOUS at 18:02

## 2021-04-03 RX ADMIN — MORPHINE SULFATE 4 MG: 4 INJECTION, SOLUTION INTRAMUSCULAR; INTRAVENOUS at 20:08

## 2021-04-03 RX ADMIN — DEXAMETHASONE SODIUM PHOSPHATE 10 MG: 10 INJECTION, SOLUTION INTRAMUSCULAR; INTRAVENOUS at 18:02

## 2021-04-03 RX ADMIN — SODIUM CHLORIDE 3000 MG: 900 INJECTION INTRAVENOUS at 18:02

## 2021-04-03 RX ADMIN — IOPAMIDOL 75 ML: 755 INJECTION, SOLUTION INTRAVENOUS at 19:07

## 2021-04-03 ASSESSMENT — PAIN DESCRIPTION - ONSET: ONSET: GRADUAL

## 2021-04-03 ASSESSMENT — PAIN SCALES - GENERAL
PAINLEVEL_OUTOF10: 4
PAINLEVEL_OUTOF10: 10
PAINLEVEL_OUTOF10: 10

## 2021-04-03 ASSESSMENT — PAIN DESCRIPTION - FREQUENCY
FREQUENCY: CONTINUOUS
FREQUENCY: CONTINUOUS

## 2021-04-03 ASSESSMENT — ENCOUNTER SYMPTOMS
ABDOMINAL DISTENTION: 0
SINUS PRESSURE: 0
FACIAL SWELLING: 1
COUGH: 0
EYE DISCHARGE: 0
STRIDOR: 0
SORE THROAT: 0
ABDOMINAL PAIN: 0
WHEEZING: 0
COLOR CHANGE: 0
DIARRHEA: 0
RHINORRHEA: 0
TROUBLE SWALLOWING: 1
BLOOD IN STOOL: 0
VOICE CHANGE: 1
BACK PAIN: 0
EYE REDNESS: 0
SHORTNESS OF BREATH: 0
EYE PAIN: 0
NAUSEA: 0
SORE THROAT: 1
VOMITING: 0

## 2021-04-03 ASSESSMENT — PAIN DESCRIPTION - PROGRESSION
CLINICAL_PROGRESSION: GRADUALLY WORSENING
CLINICAL_PROGRESSION: GRADUALLY WORSENING

## 2021-04-03 ASSESSMENT — PAIN DESCRIPTION - LOCATION
LOCATION: THROAT
LOCATION: THROAT

## 2021-04-03 ASSESSMENT — PAIN DESCRIPTION - ORIENTATION: ORIENTATION: LEFT;MID

## 2021-04-03 ASSESSMENT — PAIN - FUNCTIONAL ASSESSMENT: PAIN_FUNCTIONAL_ASSESSMENT: 0-10

## 2021-04-03 ASSESSMENT — PAIN DESCRIPTION - DESCRIPTORS: DESCRIPTORS: SORE

## 2021-04-03 NOTE — ED PROVIDER NOTES
ED PROVIDER NOTE    Chief Complaint   Patient presents with    Pharyngitis     Onset \"coupole days ago when I came here\" sore throat. Reports today can't swallow/spitting\". Pt using antibiotic & Ibuprofen       HPI:  4/3/21,   Time: 5:35 PM EDT       Jovanny Dubon is a 39 y.o. female presenting to the ED for pharyngitis. Gradual onset over the past 2 weeks since L sided dental extraction on 3/17/21. Progressively worsening, severe, worse w/ swallowing, talking, or opening mouth. Odynophagia, some dysphagia but tolerating oral secretions. No associated fever or chills. +nausea. No vomiting. Seen at urgent care and transferred here for further management. Received fentanyl 100mcg by EMS en route. Seen 4d ago, diagnosed w/ submental/submandibular adenitis, placed on cephalexin. Chart review: reviewed recent visits to urgent care. PMH of HTN, DM, vertigo, asthma    Review of Systems:     Review of Systems   Constitutional: Positive for appetite change. Negative for chills and fever. HENT: Positive for facial swelling, sore throat, trouble swallowing and voice change. Negative for congestion, drooling and rhinorrhea. Eyes: Negative for visual disturbance. Respiratory: Negative for cough, shortness of breath and stridor. Cardiovascular: Negative for chest pain. Gastrointestinal: Negative for abdominal pain, blood in stool, nausea and vomiting. Genitourinary: Negative for decreased urine volume and difficulty urinating. Musculoskeletal: Negative for back pain and neck pain. Skin: Negative for color change.    Neurological: Negative for dizziness, syncope, weakness, light-headedness, numbness and headaches.         --------------------------------------------- PAST HISTORY ---------------------------------------------  Past Medical History:   Past Medical History:   Diagnosis Date    Abnormal Pap smear 1/4/2012    Atypical glandular cells([facility])    Asthma     no attack since high school    Diabetes mellitus ([facility] [address]     type 2    Dizziness     Headache     Hypertension , ,2005    during pregnancy not on meds    Memory difficulties     Vertigo        Past Surgical History:   Past Surgical History:   Procedure Laterality Date     SECTION      x 3    DILATION AND CURETTAGE  11    ENDOMETRIAL ABLATION  11    ENDOMETRIAL BIOPSY      HYSTEROSCOPY  11    LEEP  2012   [address]    left salpingectomy for ectopic pregnancy    [place] TOTAL ABDOM HYSTERECTOMY N/A 2018    TOTAL ABDOMINAL HYSTERECTOMY  AND REMOVAL OF RIGHT FALLOPIAN TUBE performed by Cj Abdullahi MD at [address] [place] [address]         Social History:   Social History     Socioeconomic History    Marital status:      Spouse name: None    Number of children: None    Years of education: None    Highest education level: None   Occupational History    None   Social Needs    Financial resource strain: None    Food insecurity     Worry: None     Inability: None    Transportation needs     Medical: None     Non-medical: None   Tobacco Use    Smoking status: Current Every Day Smoker    Smokeless tobacco: Never Used    Tobacco comment: only smokes marijuana   Substance and Sexual Activity    Alcohol use:  Yes     Alcohol/week: 1.0 standard drinks     Types: 1 Glasses of wine per week     Comment: social    Drug use: Yes     Types: Marijuana     Comment: daily    Sexual activity: Yes     Partners: Male   Lifestyle    Physical activity     Days per week: None     Minutes per session: None    Stress: None   Relationships    Social connections     Talks on phone: None     Gets together: None     Attends Taoism service: None     Active member of club or organization: None     Attends meetings of clubs or organizations: None     Relationship status: None    Intimate partner violence     Fear of current or ex partner: None     Emotionally abused: None Physically abused: None     Forced sexual activity: None   Other Topics Concern    None   Social History Narrative    None       Family History:   Family History   Problem Relation Age of Onset    Cancer Father         prostate ca    Hypertension Father     Lupus Mother     Hypertension Mother     Diabetes Brother     Sickle Cell Anemia Daughter     Sickle Cell Trait Daughter     Breast Cancer Maternal Aunt        The patients home medications have been reviewed. Allergies: Allergies   Allergen Reactions    Aspirin Shortness Of Breath, Itching and Swelling     Eye itching and tongue swelling            ---------------------------------------------------PHYSICAL EXAM--------------------------------------    BP (!) 169/86   Pulse 80   Temp 98.3 °F (36.8 °C) (Oral)   Resp 18   Wt 193 lb (87.5 kg)   LMP 07/16/2018 Comment: always irregular  SpO2 93%   BMI 32.12 kg/m²     Physical Exam  Vitals signs and nursing note reviewed. Constitutional:       General: She is not in acute distress. Appearance: She is not toxic-appearing. HENT:      Mouth/Throat:      Comments: L posterior oropharyngeal edema, no sublingual edema or tongue elevation. +trismus. No drooling or stridor. No neck stiffness. Eyes:      General: No scleral icterus. Extraocular Movements: Extraocular movements intact. Pupils: Pupils are equal, round, and reactive to light. Neck:      Musculoskeletal: Normal range of motion and neck supple. No neck rigidity. Comments: Diffuse L submental and submandibular ttp. No crepitus/fluctuance/erythema/warmth/induration  Cardiovascular:      Rate and Rhythm: Normal rate and regular rhythm. Pulses: Normal pulses. Heart sounds: Normal heart sounds. No murmur. Pulmonary:      Effort: Pulmonary effort is normal. No respiratory distress. Breath sounds: Normal breath sounds. No wheezing or rales. Abdominal:      General: There is no distension. Palpations: Abdomen is soft. Tenderness: There is no abdominal tenderness. Musculoskeletal: Normal range of motion. General: No swelling or tenderness. Comments: Radial, DP, and PT pulses intact bilaterally. Lymphadenopathy:      Cervical: Cervical adenopathy (Left) present. Skin:     General: Skin is warm and dry. Neurological:      Mental Status: She is alert and oriented to person, place, and time. Comments: Strength 5/5 and sensation grossly intact to light touch and equal bilaterally throughout all extremities            -------------------------------------------------- RESULTS -------------------------------------------------  I have personally reviewed all laboratory and imaging results for this patient. Results are listed below. LABS:  Labs Reviewed   CBC WITH AUTO DIFFERENTIAL - Abnormal; Notable for the following components:       Result Value    WBC 17.2 (*)     MCV 79.1 (*)     Neutrophils Absolute 10.32 (*)     Lymphocytes Absolute 5.68 (*)     Monocytes Absolute 1.20 (*)     All other components within normal limits   BASIC METABOLIC PANEL W/ REFLEX TO MG FOR LOW K - Abnormal; Notable for the following components:    Glucose 123 (*)     All other components within normal limits   LACTIC ACID, PLASMA       RADIOLOGY:  Interpreted personally and by Radiologist.  CT SOFT TISSUE NECK W CONTRAST   Final Result   There is hypodensity within the left mandible at the expected location of the   last mandibular molar which is likely extracted. The hypodensity likely   represents combination of resection cavity or superimposed infection. There   is a rim enhancing collection within the soft tissues medial to this   mandibular abnormality within the left retromolar trigone, concerning for   abscess collection. The collection measures 16 x 16 x 2.7 cm.       There are bilateral nodes within different neck stations more numerous on the   left side, most likely reactive to current infection.               ------------------------- NURSING NOTES AND VITALS REVIEWED ---------------------------   The nursing notes within the ED encounter and vital signs as below have been reviewed by myself. BP (!) 169/86   Pulse 80   Temp 98.3 °F (36.8 °C) (Oral)   Resp 18   Wt 193 lb (87.5 kg)   LMP 2018 Comment: always irregular  SpO2 93%   BMI 32.12 kg/m²   Oxygen Saturation Interpretation: Normal    The patients available past medical records and past encounters were reviewed. ------------------------------ ED COURSE/MEDICAL DECISION MAKING----------------------  Medications   dexamethasone (PF) (DECADRON) injection 10 mg (10 mg Intravenous Given 4/3/21 1802)   ampicillin-sulbactam (UNASYN) 3000 mg ivpb minibag (3,000 mg Intravenous New Bag 4/3/21 1802)   0.9 % sodium chloride bolus (1,000 mLs Intravenous New Bag 4/3/21 1802)   ondansetron (ZOFRAN) injection 4 mg (4 mg Intravenous Given 4/3/21 1802)   iopamidol (ISOVUE-370) 76 % injection 75 mL (75 mLs Intravenous Given 4/3/21 1907)       Consultations:             Oral surgery - Dr Roseann Marie: Please note that the withdrawal or failure to initiate urgent interventions for this patient would likely result in a life threatening deterioration or permanent disability. Accordingly this patient received 30 minutes of critical care time, excluding separately billable procedures. Counseling: The emergency provider has spoken with the patient and discussed todays results, in addition to providing specific details for the plan of care and counseling regarding the diagnosis and prognosis. Questions are answered at this time and they are agreeable with the plan. ED Course/Medical Decision Makin y.o. female here with sore throat, trismus, odynophagia. Non-toxic appearing, afebrile, hemodynamically stable, and in no acute distress. Breathing comfortably on room air without respiratory distress.  Tolerating oral secretions. Posterior oropharynx edematous, exam limited due to trismus. L submandibular ttp. Labs notable for leukocytosis. Treated w/ IV decadron and unasyn. On reevaluation pain improving, no evidence of impending airway compromise. CT shows fluid collection concerning for abscess, likely odontogenic. Discussed w/ Dr Darell Hidalgo, oral surgery on call, who recommends transfer and admission to medicine service at [facility] where he will consult. Discussed findings and expected course of care and patient/surrogate agreed with plan for transfer and admission for further evaluation and management.       --------------------------------- IMPRESSION AND DISPOSITION ---------------------------------    IMPRESSION  1. Submandibular abscess        DISPOSITION  Disposition: Transfer to [facility] to general medical floor  Patient condition is stable    NOTE: This report was transcribed using voice recognition software.  Every effort was made to ensure accuracy; however, inadvertent computerized transcription errors may be present    Wild Velez MD  Attending Emergency Physician         Wild Velez MD  04/03/21 [id number]

## 2021-04-03 NOTE — ED PROVIDER NOTES
Seen at this facility 4 days prior; diagnosed with submental/ submandibular adenitis;  Placed on antibiotic, told to follow up if worsening pain or symptoms;  Progressive left neck swelling over the last 4 days and difficulty swallowing which began today. Solids in particular are difficult to swallow, but now she states some difficulty with liquids and the pain is markedly worse over the last 24-48 hours. The history is provided by the patient. Pharyngitis  Location:  Left  Severity:  Moderate  Onset quality:  Gradual  Progression:  Worsening  Chronicity:  Recurrent  Relieved by:  Nothing  Worsened by:  Swallowing and eating  Associated symptoms: trouble swallowing    Associated symptoms: no adenopathy, no chest pain, no chills, no cough, no ear pain, no eye discharge, no fever, no headaches, no rash, no shortness of breath and no stridor         Review of Systems   Constitutional: Positive for appetite change. Negative for chills and fever. HENT: Positive for dental problem and trouble swallowing. Negative for ear pain, sinus pressure and sore throat. Eyes: Negative for pain, discharge and redness. Respiratory: Negative for cough, shortness of breath, wheezing and stridor. Cardiovascular: Negative for chest pain. Gastrointestinal: Negative for abdominal distention, diarrhea, nausea and vomiting. Genitourinary: Negative for dysuria and frequency. Musculoskeletal: Negative for arthralgias and back pain. Skin: Negative for rash and wound. Neurological: Negative for weakness and headaches. Hematological: Negative for adenopathy. Psychiatric/Behavioral: Negative. All other systems reviewed and are negative. Physical Exam  Vitals signs and nursing note reviewed. Constitutional:       General: She is in acute distress. Appearance: She is well-developed. HENT:      Head: Normocephalic and atraumatic.       Right Ear: Tympanic membrane normal.      Left Ear: Tympanic membrane normal.      Mouth/Throat:      Pharynx: Pharyngeal swelling and posterior oropharyngeal erythema present. Eyes:      Pupils: Pupils are equal, round, and reactive to light. Neck:      Musculoskeletal: Normal range of motion and neck supple. Trachea: Trachea and phonation normal.     Cardiovascular:      Rate and Rhythm: Regular rhythm. Tachycardia present. Heart sounds: Normal heart sounds. No murmur. Pulmonary:      Effort: Pulmonary effort is normal.      Breath sounds: Normal breath sounds. Abdominal:      General: Bowel sounds are normal.      Palpations: Abdomen is soft. Tenderness: There is no abdominal tenderness. There is no guarding or rebound. Lymphadenopathy:      Cervical: Cervical adenopathy present. Left cervical: Deep cervical adenopathy present. Skin:     General: Skin is warm and dry. Neurological:      Mental Status: She is alert and oriented to person, place, and time. Psychiatric:         Behavior: Behavior normal.         Thought Content: Thought content normal.         Judgment: Judgment normal.        --------------------------------------------- PAST HISTORY ---------------------------------------------  Past Medical History:  has a past medical history of Abnormal Pap smear, Asthma, Diabetes mellitus ([facility] [address] Dizziness, Headache, Hypertension, Memory difficulties, and Vertigo. Past Surgical History:  has a past surgical history that includes  section; Tubal ligation (); pelvic laparoscopy (); Dilation & curettage (11); hysteroscopy (11); Endometrial ablation (11); Tonsillectomy; LEEP (2012); Endometrial biopsy; and pr total abdom hysterectomy (N/A, 2018). Social History:  reports that she has been smoking. She has never used smokeless tobacco. She reports current alcohol use of about 1.0 standard drinks of alcohol per week. She reports current drug use. Drug: Marijuana.     Family History: family history includes Breast Cancer in her maternal aunt; Cancer in her father; Diabetes in her brother; Hypertension in her father and mother; Lupus in her mother; Sickle Cell Anemia in her daughter; Sickle Cell Trait in her daughter. The patientS home medications have been reviewed. Allergies: Aspirin    -------------------------------------------------- RESULTS -------------------------------------------------  No results found for this visit on 04/03/21. No orders to display       ------------------------- NURSING NOTES AND VITALS REVIEWED ---------------------------   The nursing notes within the ED encounter and vital signs as below have been reviewed. BP (!) 203/124   Pulse 108   Temp 97.3 °F (36.3 °C) (Temporal)   Resp 24   Wt 193 lb (87.5 kg)   LMP 07/16/2018 Comment: always irregular  SpO2 99%   BMI 32.12 kg/m²   Oxygen Saturation Interpretation: Normal      ------------------------------------------ PROGRESS NOTES ------------------------------------------   I have spoken with the patient and discussed todays results, in addition to providing specific details for the plan of care and counseling regarding the diagnosis and prognosis. Their questions are answered at this time and they are agreeable with the plan.      --------------------------------- ADDITIONAL PROVIDER NOTES ---------------------------------        This patient is stable for EMS transfer, but her prognosis is guarded pending additional evaluation at a higher level of acute medical care. Dr Mmee Cuevas, ED Attending, is accepting this patient for further evaluation and work up in a higher level acute care setting. My immediate concerns are the possibility of a peritonsillar abscess or other acute pathology, (including her BP status) that would precipitate the level of pain, odynophagia and clinical presentation exhibited today. IMPRESSION:     1. Difficulty swallowing solids    2.  Uncontrolled hypertension      Patient's

## 2021-04-04 PROBLEM — K12.2 ABSCESS OF SUBMANDIBULAR REGION: Status: ACTIVE | Noted: 2021-04-04

## 2021-04-04 LAB
ANION GAP SERPL CALCULATED.3IONS-SCNC: 12 MMOL/L (ref 7–16)
BUN BLDV-MCNC: 7 MG/DL (ref 6–20)
CALCIUM SERPL-MCNC: 9.2 MG/DL (ref 8.6–10.2)
CHLORIDE BLD-SCNC: 102 MMOL/L (ref 98–107)
CO2: 22 MMOL/L (ref 22–29)
CREAT SERPL-MCNC: 0.7 MG/DL (ref 0.5–1)
GFR AFRICAN AMERICAN: >60
GFR NON-AFRICAN AMERICAN: >60 ML/MIN/1.73
GLUCOSE BLD-MCNC: 140 MG/DL (ref 74–99)
HCT VFR BLD CALC: 38.1 % (ref 34–48)
HEMOGLOBIN: 13 G/DL (ref 11.5–15.5)
MCH RBC QN AUTO: 26.9 PG (ref 26–35)
MCHC RBC AUTO-ENTMCNC: 34.1 % (ref 32–34.5)
MCV RBC AUTO: 78.7 FL (ref 80–99.9)
PDW BLD-RTO: 12.9 FL (ref 11.5–15)
PLATELET # BLD: 302 E9/L (ref 130–450)
PMV BLD AUTO: 9.2 FL (ref 7–12)
POTASSIUM SERPL-SCNC: 3.8 MMOL/L (ref 3.5–5)
RBC # BLD: 4.84 E12/L (ref 3.5–5.5)
SODIUM BLD-SCNC: 136 MMOL/L (ref 132–146)
WBC # BLD: 15.4 E9/L (ref 4.5–11.5)

## 2021-04-04 PROCEDURE — 96375 TX/PRO/DX INJ NEW DRUG ADDON: CPT

## 2021-04-04 PROCEDURE — 36415 COLL VENOUS BLD VENIPUNCTURE: CPT

## 2021-04-04 PROCEDURE — 96366 THER/PROPH/DIAG IV INF ADDON: CPT

## 2021-04-04 PROCEDURE — 6370000000 HC RX 637 (ALT 250 FOR IP): Performed by: ORAL & MAXILLOFACIAL SURGERY

## 2021-04-04 PROCEDURE — 1200000000 HC SEMI PRIVATE

## 2021-04-04 PROCEDURE — 96365 THER/PROPH/DIAG IV INF INIT: CPT

## 2021-04-04 PROCEDURE — 6370000000 HC RX 637 (ALT 250 FOR IP): Performed by: EMERGENCY MEDICINE

## 2021-04-04 PROCEDURE — 85027 COMPLETE CBC AUTOMATED: CPT

## 2021-04-04 PROCEDURE — 80048 BASIC METABOLIC PNL TOTAL CA: CPT

## 2021-04-04 PROCEDURE — 96376 TX/PRO/DX INJ SAME DRUG ADON: CPT

## 2021-04-04 PROCEDURE — 6360000002 HC RX W HCPCS: Performed by: EMERGENCY MEDICINE

## 2021-04-04 PROCEDURE — 2580000003 HC RX 258: Performed by: EMERGENCY MEDICINE

## 2021-04-04 PROCEDURE — 6360000002 HC RX W HCPCS: Performed by: ORAL & MAXILLOFACIAL SURGERY

## 2021-04-04 PROCEDURE — 96372 THER/PROPH/DIAG INJ SC/IM: CPT

## 2021-04-04 PROCEDURE — G0378 HOSPITAL OBSERVATION PER HR: HCPCS

## 2021-04-04 PROCEDURE — 2580000003 HC RX 258: Performed by: ORAL & MAXILLOFACIAL SURGERY

## 2021-04-04 RX ORDER — ACETAMINOPHEN 650 MG/1
650 SUPPOSITORY RECTAL EVERY 6 HOURS PRN
Status: DISCONTINUED | OUTPATIENT
Start: 2021-04-04 | End: 2021-04-05 | Stop reason: HOSPADM

## 2021-04-04 RX ORDER — SODIUM CHLORIDE 9 MG/ML
25 INJECTION, SOLUTION INTRAVENOUS PRN
Status: DISCONTINUED | OUTPATIENT
Start: 2021-04-04 | End: 2021-04-05 | Stop reason: HOSPADM

## 2021-04-04 RX ORDER — CHLORHEXIDINE GLUCONATE 0.12 MG/ML
15 RINSE ORAL 2 TIMES DAILY
Status: DISCONTINUED | OUTPATIENT
Start: 2021-04-04 | End: 2021-04-05 | Stop reason: HOSPADM

## 2021-04-04 RX ORDER — SODIUM CHLORIDE 0.9 % (FLUSH) 0.9 %
10 SYRINGE (ML) INJECTION EVERY 12 HOURS SCHEDULED
Status: DISCONTINUED | OUTPATIENT
Start: 2021-04-04 | End: 2021-04-05 | Stop reason: HOSPADM

## 2021-04-04 RX ORDER — PROMETHAZINE HYDROCHLORIDE 25 MG/1
12.5 TABLET ORAL EVERY 6 HOURS PRN
Status: DISCONTINUED | OUTPATIENT
Start: 2021-04-04 | End: 2021-04-05 | Stop reason: HOSPADM

## 2021-04-04 RX ORDER — ACETAMINOPHEN 160 MG/5ML
650 SOLUTION ORAL EVERY 6 HOURS PRN
Status: DISCONTINUED | OUTPATIENT
Start: 2021-04-04 | End: 2021-04-05 | Stop reason: HOSPADM

## 2021-04-04 RX ORDER — MORPHINE SULFATE 2 MG/ML
2 INJECTION, SOLUTION INTRAMUSCULAR; INTRAVENOUS
Status: DISCONTINUED | OUTPATIENT
Start: 2021-04-04 | End: 2021-04-05 | Stop reason: HOSPADM

## 2021-04-04 RX ORDER — ACETAMINOPHEN 325 MG/1
650 TABLET ORAL EVERY 6 HOURS PRN
Status: DISCONTINUED | OUTPATIENT
Start: 2021-04-04 | End: 2021-04-04 | Stop reason: ALTCHOICE

## 2021-04-04 RX ORDER — POLYETHYLENE GLYCOL 3350 17 G/17G
17 POWDER, FOR SOLUTION ORAL DAILY PRN
Status: DISCONTINUED | OUTPATIENT
Start: 2021-04-04 | End: 2021-04-05 | Stop reason: HOSPADM

## 2021-04-04 RX ORDER — ONDANSETRON 2 MG/ML
4 INJECTION INTRAMUSCULAR; INTRAVENOUS EVERY 6 HOURS PRN
Status: DISCONTINUED | OUTPATIENT
Start: 2021-04-04 | End: 2021-04-05 | Stop reason: HOSPADM

## 2021-04-04 RX ORDER — SODIUM CHLORIDE 0.9 % (FLUSH) 0.9 %
10 SYRINGE (ML) INJECTION PRN
Status: DISCONTINUED | OUTPATIENT
Start: 2021-04-04 | End: 2021-04-05 | Stop reason: HOSPADM

## 2021-04-04 RX ORDER — DEXAMETHASONE SODIUM PHOSPHATE 4 MG/ML
4 INJECTION, SOLUTION INTRA-ARTICULAR; INTRALESIONAL; INTRAMUSCULAR; INTRAVENOUS; SOFT TISSUE EVERY 6 HOURS
Status: COMPLETED | OUTPATIENT
Start: 2021-04-04 | End: 2021-04-05

## 2021-04-04 RX ADMIN — 0.12% CHLORHEXIDINE GLUCONATE 15 ML: 1.2 RINSE ORAL at 20:08

## 2021-04-04 RX ADMIN — SODIUM CHLORIDE 3000 MG: 900 INJECTION INTRAVENOUS at 06:40

## 2021-04-04 RX ADMIN — 0.12% CHLORHEXIDINE GLUCONATE 15 ML: 1.2 RINSE ORAL at 09:54

## 2021-04-04 RX ADMIN — DEXAMETHASONE SODIUM PHOSPHATE 4 MG: 4 INJECTION, SOLUTION INTRA-ARTICULAR; INTRALESIONAL; INTRAMUSCULAR; INTRAVENOUS; SOFT TISSUE at 09:55

## 2021-04-04 RX ADMIN — DEXAMETHASONE SODIUM PHOSPHATE 4 MG: 4 INJECTION, SOLUTION INTRA-ARTICULAR; INTRALESIONAL; INTRAMUSCULAR; INTRAVENOUS; SOFT TISSUE at 20:08

## 2021-04-04 RX ADMIN — ACETAMINOPHEN ORAL SOLUTION 650 MG: 650 SOLUTION ORAL at 16:07

## 2021-04-04 RX ADMIN — SODIUM CHLORIDE, PRESERVATIVE FREE 10 ML: 5 INJECTION INTRAVENOUS at 20:08

## 2021-04-04 RX ADMIN — AMPICILLIN AND SULBACTAM 3000 MG: 2; 1 INJECTION, POWDER, FOR SOLUTION INTRAMUSCULAR; INTRAVENOUS at 20:08

## 2021-04-04 RX ADMIN — ACETAMINOPHEN ORAL SOLUTION 650 MG: 650 SOLUTION ORAL at 10:13

## 2021-04-04 RX ADMIN — AMPICILLIN AND SULBACTAM 3000 MG: 2; 1 INJECTION, POWDER, FOR SOLUTION INTRAMUSCULAR; INTRAVENOUS at 12:49

## 2021-04-04 RX ADMIN — MORPHINE SULFATE 2 MG: 2 INJECTION, SOLUTION INTRAMUSCULAR; INTRAVENOUS at 17:21

## 2021-04-04 RX ADMIN — ONDANSETRON 4 MG: 2 INJECTION INTRAMUSCULAR; INTRAVENOUS at 02:28

## 2021-04-04 RX ADMIN — MORPHINE SULFATE 2 MG: 2 INJECTION, SOLUTION INTRAMUSCULAR; INTRAVENOUS at 04:49

## 2021-04-04 RX ADMIN — MORPHINE SULFATE 2 MG: 2 INJECTION, SOLUTION INTRAMUSCULAR; INTRAVENOUS at 10:08

## 2021-04-04 RX ADMIN — DEXAMETHASONE SODIUM PHOSPHATE 4 MG: 4 INJECTION, SOLUTION INTRA-ARTICULAR; INTRALESIONAL; INTRAMUSCULAR; INTRAVENOUS; SOFT TISSUE at 15:10

## 2021-04-04 RX ADMIN — MORPHINE SULFATE 2 MG: 2 INJECTION, SOLUTION INTRAMUSCULAR; INTRAVENOUS at 01:22

## 2021-04-04 RX ADMIN — ENOXAPARIN SODIUM 40 MG: 40 INJECTION SUBCUTANEOUS at 09:55

## 2021-04-04 ASSESSMENT — PAIN DESCRIPTION - LOCATION
LOCATION: MOUTH
LOCATION: MOUTH

## 2021-04-04 ASSESSMENT — PAIN SCALES - GENERAL
PAINLEVEL_OUTOF10: 3
PAINLEVEL_OUTOF10: 3

## 2021-04-04 ASSESSMENT — PAIN DESCRIPTION - FREQUENCY
FREQUENCY: CONTINUOUS
FREQUENCY: CONTINUOUS

## 2021-04-04 ASSESSMENT — PAIN DESCRIPTION - PAIN TYPE: TYPE: ACUTE PAIN

## 2021-04-04 ASSESSMENT — PAIN DESCRIPTION - DESCRIPTORS: DESCRIPTORS: ACHING;DULL;DISCOMFORT

## 2021-04-04 ASSESSMENT — PAIN - FUNCTIONAL ASSESSMENT: PAIN_FUNCTIONAL_ASSESSMENT: PREVENTS OR INTERFERES SOME ACTIVE ACTIVITIES AND ADLS

## 2021-04-04 ASSESSMENT — PAIN DESCRIPTION - ONSET: ONSET: ON-GOING

## 2021-04-04 ASSESSMENT — PAIN DESCRIPTION - ORIENTATION: ORIENTATION: LEFT

## 2021-04-04 NOTE — H&P
INTERNAL MEDICINE HISTORY AND PHYSICAL EXAM     CHIEF COMPLAINT:   No chief complaint on file. HISTORY OF PRESENTING ILLNESS:     39 y.o. female presenting to the ED for pharyngitis. Gradual onset over the past 2 weeks since L sided dental extraction on 3/17/21. Progressively worsening, severe, worse w/ swallowing, talking, or opening mouth. Odynophagia, some dysphagia but tolerating oral secretions. No associated fever or chills. +nausea. No vomiting. She went to an urgent care and was then referred to the emergency department, CT scan showed fluid collection concerning for abscess likely odontogenic, oral surgeon Dr. Skyler Hernandez was consulted at [facility] who recommended transferring patient to our facility, patient was started on Decadron and Unasyn and was then transferred to [facility].     PAST MEDICAL HISTORY  Past Medical History:   Diagnosis Date    Abnormal Pap smear 2012    Atypical glandular cells([facility])    Asthma     no attack since high school    Diabetes mellitus ([facility] [address]     type 2    Dizziness     Headache     Hypertension , ,    during pregnancy not on meds    Memory difficulties     Vertigo        PAST SURGICAL HISTORY  Past Surgical History:   Procedure Laterality Date     SECTION      x 3    DILATION AND CURETTAGE  11    ENDOMETRIAL ABLATION  11    ENDOMETRIAL BIOPSY      HYSTEROSCOPY  11    LEEP  2012   [address]    left salpingectomy for ectopic pregnancy    NM TOTAL ABDOM HYSTERECTOMY N/A 2018    TOTAL ABDOMINAL HYSTERECTOMY  AND REMOVAL OF RIGHT FALLOPIAN TUBE performed by Clarice Alpers, MD at [address] [place] [address] Drive         FAMILY HISTORY  Family History   Problem Relation Age of Onset    Cancer Father         prostate ca    Hypertension Father     Lupus Mother     Hypertension Mother     Diabetes Brother     Sickle Cell Anemia Daughter     Sickle Cell Trait Daughter     Breast Cancer Maternal Aunt           SOCIAL HISTORY   reports that she has been smoking. She has never used smokeless tobacco. She reports current alcohol use of about 1.0 standard drinks of alcohol per week. She reports current drug use. Drug: Marijuana. MEDICATIONS   Medications Prior to Admission: cephALEXin (KEFLEX) 500 MG capsule, Take 1 capsule by mouth 4 times daily for 10 days  Current Facility-Administered Medications   Medication Dose Route Frequency Provider Last Rate Last Admin    sodium chloride flush 0.9 % injection 10 mL  10 mL Intravenous 2 times per day Shashi Rivas MD        sodium chloride flush 0.9 % injection 10 mL  10 mL Intravenous PRN Shashi Rivas MD        0.9 % sodium chloride infusion  25 mL Intravenous PRN Shashi Rivas MD        enoxaparin (LOVENOX) injection 40 mg  40 mg Subcutaneous Daily Shashi Rivas MD        promethazine (PHENERGAN) tablet 12.5 mg  12.5 mg Oral Q6H PRN Shashi Rivas MD        Or    ondansetron (ZOFRAN) injection 4 mg  4 mg Intravenous Q6H PRN Shashi Rivas MD        polyethylene glycol (GLYCOLAX) packet 17 g  17 g Oral Daily PRN Shashi Rivas MD        acetaminophen (TYLENOL) tablet 650 mg  650 mg Oral Q6H PRN Shashi Rivas MD        Or   Ananya White acetaminophen (TYLENOL) suppository 650 mg  650 mg Rectal Q6H PRN Shashi Rivas MD        morphine (PF) injection 2 mg  2 mg Intravenous Q3H PRN Shashi Rivas MD         Prior to Admission medications    Medication Sig Start Date End Date Taking? Authorizing Provider   cephALEXin (KEFLEX) 500 MG capsule Take 1 capsule by mouth 4 times daily for 10 days 3/30/21 4/9/21  Melanie Martinez, DO       ALLERGIES   Aspirin    REVIEW OF SYSTEMS:  12 point review of system was done in detail with the patient and is negative except as above in HPI.     PHYSICAL EXAM:  VS: BP (!) 158/96   Pulse 86   Temp 97.7 °F (36.5 °C) (Temporal)   Resp 18   Ht 5' 5\" (1.651 - 5.0 mmol/L    Chloride 107 98 - 107 mmol/L    CO2 22 22 - 29 mmol/L    Anion Gap 12 7 - 16 mmol/L    Glucose 123 (H) 74 - 99 mg/dL    BUN 10 6 - 20 mg/dL    CREATININE 0.8 0.5 - 1.0 mg/dL    GFR Non-African American >60 >=60 mL/min/1.73    GFR African American >60     Calcium 9.1 8.6 - 10.2 mg/dL   Lactic Acid, Plasma    Collection Time: 04/03/21  5:45 PM   Result Value Ref Range    Lactic Acid 1.5 0.5 - 2.2 mmol/L       RADIOLOGY:  Ct Soft Tissue Neck W Contrast    Result Date: 4/3/2021  EXAMINATION: CT OF THE NECK SOFT TISSUE WITH CONTRAST  4/3/2021 TECHNIQUE: CT of the neck was performed with the administration of intravenous contrast. Multiplanar reformatted images are provided for review. Dose modulation, iterative reconstruction, and/or weight based adjustment of the mA/kV was utilized to reduce the radiation dose to as low as reasonably achievable. COMPARISON: None. HISTORY: ORDERING SYSTEM PROVIDED HISTORY: L sided neck and throat pain/swelling, trismus, dysphagia TECHNOLOGIST PROVIDED HISTORY: Reason for exam:->L sided neck and throat pain/swelling, trismus, dysphagia Decision Support Exception->Emergency Medical Condition (MA) FINDINGS: PHARYNX/LARYNX:  The palatine tonsils are normal in appearance. The tongue is normal in appearance. The valleculae, epiglottis, aryepiglottic folds and pyriform sinuses appear unremarkable. The true and false vocal cords are normal in appearance. Mandible: There is hypodensity within the left mandible at the expected location of the last mandibular molar which is likely extracted. The hypodensity likely represents combination of resection cavity or superimposed infection. There is a rim enhancing collection within the soft tissues medial to this mandibular abnormality within the left retromolar trigone, concerning for abscess collection. The collection measures 16 x 16 x 2.7 cm. SALIVARY GLANDS/THYROID:  The parotid and submandibular glands appear unremarkable.   The thyroid gland appears unremarkable. LYMPH NODES:  There are bilateral nodes within different neck stations more numerous on the left side, most likely reactive to current infection. BRAIN/ORBITS/SINUSES:  The visualized portion of the intracranial contents appear unremarkable. The visualized portion of the orbits, paranasal sinuses and mastoid air cells demonstrate no acute abnormality. LUNG APICES/SUPERIOR MEDIASTINUM:  No focal consolidation is seen within the visualized lung apices. No superior mediastinal lymphadenopathy or mass. The visualized portion of the trachea appears unremarkable. BONES:  No aggressive appearing lytic or blastic bony lesion. There is hypodensity within the left mandible at the expected location of the last mandibular molar which is likely extracted. The hypodensity likely represents combination of resection cavity or superimposed infection. There is a rim enhancing collection within the soft tissues medial to this mandibular abnormality within the left retromolar trigone, concerning for abscess collection. The collection measures 16 x 16 x 2.7 cm. There are bilateral nodes within different neck stations more numerous on the left side, most likely reactive to current infection. ASSESSMENT AND PLAN  Active Problems:    Dental abscess  Resolved Problems:    * No resolved hospital problems. *    Impression  1. Acute left mandible abscess within the left retromolar trigone, the collection measures 16 x 16 x 2.7 cm  2. Cervical lymphadenopathy    Plan  · We will start patient on IV antibiotic  · Keeping him patient n.p.o. · We will consult dental surgery      DVT prophylaxis: Subcutaneous heparin  CODE STATUS: Patient is a full code  Discharge plan: Patient can be discharged next 3 to 4 days to home with and without home health.,  Pending clinical improvement, pending work-up and clearance by consulting services.     Please note that over 35 minutes was spent in evaluating the

## 2021-04-04 NOTE — CONSULTS
04/04/21 0640    Aspirin allergy    family history includes Breast Cancer in her maternal aunt; Cancer in her father; Diabetes in her brother; Hypertension in her father and mother; Lupus in her mother; Sickle Cell Anemia in her daughter; Sickle Cell Trait in her daughter. reports that she has been smoking. She has never used smokeless tobacco. She reports current alcohol use of about 1.0 standard drinks of alcohol per week. She reports current drug use. Drug: Marijuana. Physical Exam:  Vitals:    04/03/21 2315   BP: (!) 158/96   Pulse: 86   Resp: 18   Temp: 97.7 °F (36.5 °C)   SpO2: 93%     Wt Readings from Last 3 Encounters:   04/03/21 193 lb (87.5 kg)   03/30/21 191 lb (86.6 kg)   05/25/20 198 lb (89.8 kg)     Labs   CBC with Differential:    Lab Results   Component Value Date    WBC 17.2 04/03/2021    RBC 4.70 04/03/2021    HGB 12.7 04/03/2021    HCT 37.2 04/03/2021     04/03/2021    MCV 79.1 04/03/2021    MCH 27.0 04/03/2021    MCHC 34.1 04/03/2021    RDW 12.8 04/03/2021    LYMPHOPCT 32.5 04/03/2021    MONOPCT 7.0 04/03/2021    BASOPCT 0.3 04/03/2021    MONOSABS 1.20 04/03/2021    LYMPHSABS 5.68 04/03/2021    EOSABS 0.15 04/03/2021    BASOSABS 0.00 04/03/2021      General:   Head: Normocephalic, atraumatic, no contusion or laceration, no Peterson's sign, denies paresthesia  EENT: PERRLA, EOMI, sclera white, conjunctiva pink, vision intact OU, no hemotympanum, nares patent, mild swelling in left retromolar/tonsillar region, no palpable swelling in left lingula aspect of third molar extraction site  Neck: tender in left submandibular area but no palpable fluctuance or obvious swelling, trachea midline    Radiology: CT neck: There is hypodensity within the left mandible at the expected location of the   last mandibular molar which is likely extracted. The hypodensity likely   represents combination of resection cavity or superimposed infection.  There   is a rim enhancing collection within the soft tissues medial to this   mandibular abnormality within the left retromolar trigone, concerning for   abscess collection. The collection measures 16 x 16 x 2.7 cm.       There are bilateral nodes within different neck stations more numerous on the   left side, most likely reactive to current infection. Assessment/Plan: 51-year-old black female status post extraction of left mandibular third molar on 3/17/21 developed gradually worsening swelling in the area over the last 2 weeks. Though the CT scan demonstrates a small loculation in the lingual aspect of the mandible, I can not palpate any gross swelling either intra or extraorally. I recommend continuation of IV Unasyn and Decadron for now as well as medical management of her DM. I will follow daily to determine if surgical I&D is necessary moving forward. OK for dental soft diet for now. Kaylynn Henry D.D.S.   Oral & Maxillofacial Surgery  4/4/2021  7:25 AM

## 2021-04-04 NOTE — ED NOTES
Report called to Renetta Peralta on [address]/[facility]     Rosalinda Shaver RN  04/03/21 6541

## 2021-04-05 VITALS
HEIGHT: 65 IN | HEART RATE: 83 BPM | BODY MASS INDEX: 32.19 KG/M2 | DIASTOLIC BLOOD PRESSURE: 95 MMHG | OXYGEN SATURATION: 100 % | SYSTOLIC BLOOD PRESSURE: 144 MMHG | TEMPERATURE: 98.5 F | RESPIRATION RATE: 18 BRPM | WEIGHT: 193.2 LBS

## 2021-04-05 LAB
ANION GAP SERPL CALCULATED.3IONS-SCNC: 4 MMOL/L (ref 7–16)
BASOPHILS ABSOLUTE: 0.02 E9/L (ref 0–0.2)
BASOPHILS RELATIVE PERCENT: 0.1 % (ref 0–2)
BUN BLDV-MCNC: 8 MG/DL (ref 6–20)
CALCIUM SERPL-MCNC: 8.8 MG/DL (ref 8.6–10.2)
CHLORIDE BLD-SCNC: 103 MMOL/L (ref 98–107)
CO2: 23 MMOL/L (ref 22–29)
CREAT SERPL-MCNC: 0.7 MG/DL (ref 0.5–1)
EOSINOPHILS ABSOLUTE: 0 E9/L (ref 0.05–0.5)
EOSINOPHILS RELATIVE PERCENT: 0 % (ref 0–6)
GFR AFRICAN AMERICAN: >60
GFR NON-AFRICAN AMERICAN: >60 ML/MIN/1.73
GLUCOSE BLD-MCNC: 184 MG/DL (ref 74–99)
HBA1C MFR BLD: 6.1 % (ref 4–5.6)
HCT VFR BLD CALC: 38.6 % (ref 34–48)
HEMOGLOBIN: 12.8 G/DL (ref 11.5–15.5)
IMMATURE GRANULOCYTES #: 0.13 E9/L
IMMATURE GRANULOCYTES %: 0.7 % (ref 0–5)
LYMPHOCYTES ABSOLUTE: 1.55 E9/L (ref 1.5–4)
LYMPHOCYTES RELATIVE PERCENT: 8.2 % (ref 20–42)
MCH RBC QN AUTO: 26.7 PG (ref 26–35)
MCHC RBC AUTO-ENTMCNC: 33.2 % (ref 32–34.5)
MCV RBC AUTO: 80.4 FL (ref 80–99.9)
MONOCYTES ABSOLUTE: 0.44 E9/L (ref 0.1–0.95)
MONOCYTES RELATIVE PERCENT: 2.3 % (ref 2–12)
NEUTROPHILS ABSOLUTE: 16.72 E9/L (ref 1.8–7.3)
NEUTROPHILS RELATIVE PERCENT: 88.7 % (ref 43–80)
PDW BLD-RTO: 13 FL (ref 11.5–15)
PLATELET # BLD: 332 E9/L (ref 130–450)
PMV BLD AUTO: 9.8 FL (ref 7–12)
POTASSIUM SERPL-SCNC: 3.8 MMOL/L (ref 3.5–5)
RBC # BLD: 4.8 E12/L (ref 3.5–5.5)
SODIUM BLD-SCNC: 130 MMOL/L (ref 132–146)
WBC # BLD: 18.9 E9/L (ref 4.5–11.5)

## 2021-04-05 PROCEDURE — 80048 BASIC METABOLIC PNL TOTAL CA: CPT

## 2021-04-05 PROCEDURE — 96366 THER/PROPH/DIAG IV INF ADDON: CPT

## 2021-04-05 PROCEDURE — G0378 HOSPITAL OBSERVATION PER HR: HCPCS

## 2021-04-05 PROCEDURE — 6370000000 HC RX 637 (ALT 250 FOR IP): Performed by: ORAL & MAXILLOFACIAL SURGERY

## 2021-04-05 PROCEDURE — 2580000003 HC RX 258: Performed by: EMERGENCY MEDICINE

## 2021-04-05 PROCEDURE — 6360000002 HC RX W HCPCS: Performed by: EMERGENCY MEDICINE

## 2021-04-05 PROCEDURE — 2580000003 HC RX 258: Performed by: INTERNAL MEDICINE

## 2021-04-05 PROCEDURE — 85025 COMPLETE CBC W/AUTO DIFF WBC: CPT

## 2021-04-05 PROCEDURE — 6360000002 HC RX W HCPCS: Performed by: ORAL & MAXILLOFACIAL SURGERY

## 2021-04-05 PROCEDURE — 83036 HEMOGLOBIN GLYCOSYLATED A1C: CPT

## 2021-04-05 PROCEDURE — 2580000003 HC RX 258: Performed by: ORAL & MAXILLOFACIAL SURGERY

## 2021-04-05 PROCEDURE — 6370000000 HC RX 637 (ALT 250 FOR IP): Performed by: EMERGENCY MEDICINE

## 2021-04-05 PROCEDURE — 36415 COLL VENOUS BLD VENIPUNCTURE: CPT

## 2021-04-05 PROCEDURE — 96376 TX/PRO/DX INJ SAME DRUG ADON: CPT

## 2021-04-05 RX ORDER — AMOXICILLIN AND CLAVULANATE POTASSIUM 875; 125 MG/1; MG/1
1 TABLET, FILM COATED ORAL 2 TIMES DAILY
Qty: 28 TABLET | Refills: 0 | Status: SHIPPED | OUTPATIENT
Start: 2021-04-05 | End: 2021-04-19

## 2021-04-05 RX ORDER — METHYLPREDNISOLONE 4 MG/1
TABLET ORAL
Qty: 1 KIT | Refills: 0 | Status: SHIPPED | OUTPATIENT
Start: 2021-04-05 | End: 2021-04-11

## 2021-04-05 RX ORDER — 0.9 % SODIUM CHLORIDE 0.9 %
500 INTRAVENOUS SOLUTION INTRAVENOUS ONCE
Status: COMPLETED | OUTPATIENT
Start: 2021-04-05 | End: 2021-04-05

## 2021-04-05 RX ORDER — CHLORHEXIDINE GLUCONATE 0.12 MG/ML
15 RINSE ORAL 2 TIMES DAILY
Qty: 420 ML | Refills: 0 | Status: SHIPPED | OUTPATIENT
Start: 2021-04-05 | End: 2021-04-19

## 2021-04-05 RX ADMIN — ACETAMINOPHEN ORAL SOLUTION 650 MG: 650 SOLUTION ORAL at 05:54

## 2021-04-05 RX ADMIN — DEXAMETHASONE SODIUM PHOSPHATE 4 MG: 4 INJECTION, SOLUTION INTRA-ARTICULAR; INTRALESIONAL; INTRAMUSCULAR; INTRAVENOUS; SOFT TISSUE at 02:10

## 2021-04-05 RX ADMIN — AMPICILLIN AND SULBACTAM 3000 MG: 2; 1 INJECTION, POWDER, FOR SOLUTION INTRAMUSCULAR; INTRAVENOUS at 02:10

## 2021-04-05 RX ADMIN — AMPICILLIN AND SULBACTAM 3000 MG: 2; 1 INJECTION, POWDER, FOR SOLUTION INTRAMUSCULAR; INTRAVENOUS at 05:54

## 2021-04-05 RX ADMIN — MORPHINE SULFATE 2 MG: 2 INJECTION, SOLUTION INTRAMUSCULAR; INTRAVENOUS at 02:44

## 2021-04-05 RX ADMIN — SODIUM CHLORIDE, PRESERVATIVE FREE 10 ML: 5 INJECTION INTRAVENOUS at 08:31

## 2021-04-05 RX ADMIN — SODIUM CHLORIDE 500 ML: 9 INJECTION, SOLUTION INTRAVENOUS at 09:38

## 2021-04-05 RX ADMIN — DEXAMETHASONE SODIUM PHOSPHATE 4 MG: 4 INJECTION, SOLUTION INTRA-ARTICULAR; INTRALESIONAL; INTRAMUSCULAR; INTRAVENOUS; SOFT TISSUE at 08:31

## 2021-04-05 RX ADMIN — 0.12% CHLORHEXIDINE GLUCONATE 15 ML: 1.2 RINSE ORAL at 08:30

## 2021-04-05 ASSESSMENT — PAIN SCALES - GENERAL
PAINLEVEL_OUTOF10: 6
PAINLEVEL_OUTOF10: 0

## 2021-04-05 NOTE — PROGRESS NOTES
CLINICAL PHARMACY NOTE: MEDS TO [address] [place] [address] Drive Select Patient?: No  Total # of Prescriptions Filled: 3   The following medications were delivered to the patient:  · Medrol dose pack  · chlorhexidone gluc . 12% soluiton  · Amoxicillin -pot 875-125 mg  Total # of Interventions Completed: 3  Time Spent (min): 30    Additional Documentation:

## 2021-04-05 NOTE — DISCHARGE SUMMARY
Discharge Summary    Admit date: 4/3/2021    Discharge date and time: No discharge date for patient encounter. Admitting Physician: Wild Gardner MD     Consultants: Oral surgery    Admission Diagnoses:  Left mandible abscess    Discharge Diagnoses and Hospital Course:  1. Acute left mandible abscess within the left retromolar trigone, the collection measures 16 x 16 x 2.7 cm- IV Unasyn and Decadron while in hospital. Oral surgery followed. DC with augmentin for 14 days and medrol dose raoul  2. Cervical lymphadenopathy  3. Leukocytosis  4. Hyperglycemia- on steriods, hemoglobin A1c 6.1    Discharge Exam:  Vitals:    04/05/21 0745   BP: (!) 144/95   Pulse: 83   Resp: 18   Temp: 98.5 °F (36.9 °C)   SpO2: 100%       General appearance:  awake, alert, and oriented to person, place, time, and purpose; appears stated age and cooperative; no apparent distress no labored breathing  HEENT:  Conjunctivae/corneas clear. Neck: Supple. No jugular venous distention. Respiratory: symmetrical; clear to auscultation bilaterally; no wheezes; no rhonchi; no rales  Cardiovascular: rhythm regular; rate controlled; no murmurs  Abdomen: Soft, nontender, nondistended  Extremities:  peripheral pulses present; no peripheral edema; no ulcers  Musculoskeletal: No clubbing, cyanosis, no bilateral lower extremity edema. Brisk capillary refill. Skin:  No rashes  on visible skin  Neurologic: awake, alert and following commands     Disposition: home  The patient's condition is fair. At this time the patient is without objective evidence of an acute process requiring continuing hospitalization or inpatient management. They are stable for discharge with outpatient follow-up. I have spoken with the patient and discussed the results of the current hospitalization, in addition to providing specific details for the plan of care and counseling regarding the diagnosis and prognosis.   The plan has been discussed in detail and they are aware of the specific conditions for emergent return, as well as the importance of follow-up. Their questions are answered at this time and they are agreeable with the plan for discharge to home     Patient Instructions: Follow up with PCP within 7 days. Follow with oral surgery next week. No future appointments. Discharge Medications:     Medication List      START taking these medications    amoxicillin-clavulanate 875-125 MG per tablet  Commonly known as: AUGMENTIN  Take 1 tablet by mouth 2 times daily for 14 days     chlorhexidine 0.12 % solution  Commonly known as: PERIDEX  Take 15 mLs by mouth 2 times daily for 14 days     methylPREDNISolone 4 MG tablet  Commonly known as: MEDROL DOSEPACK  Take by mouth, follow kit instructions        STOP taking these medications    cephALEXin 500 MG capsule  Commonly known as: Keflex           Where to Get Your Medications      These medications were sent to [facility], [address] [address]    Phone: [phone]   · amoxicillin-clavulanate 875-125 MG per tablet  · chlorhexidine 0.12 % solution  · methylPREDNISolone 4 MG tablet         Activity: activity as tolerated    Diet: regular diet    Wound Care: none needed    Follow-up:    · This patient is instructed to follow-up with her primary care physician. · Patient is instructed to follow-up with the consults listed above as directed by them. · They are instructed to resume home medications and take new medications as indicated in the list above. · If the patient has a recurrence of symptoms, they are instructed to go to the ED. Preparing for this patient's discharge, including paperwork, orders, instructions, and meeting with patient did require > 30 minutes.     Herman Prather DO   12:45 PM  4/5/2021

## 2021-04-05 NOTE — PROGRESS NOTES
 Abnormal Pap smear    AGCUS (atypical glandular cells of undetermined significance) on Pap smear    Pelvic pain in female    Abscess of submandibular region       Plan: doing much better; leukocytosis and hyperglycemia likely secondary to steroids; I believe she can be discharged on a Medrol dose pack and, most importantly, 2 weeks of antibiotics. I recommend Augmenting, 875 mg BID. I advised her that it is possible that despite this currently successful medical treatment, she could reaccumulate pus in the submandibular region that will require surgical drainage. I see no need for that now. She should come to see me in my office next week.     Artem Staley DDS  Oral & Maxillofacial Surgery  12:02 PM  4/5/2021

## 2021-04-05 NOTE — PLAN OF CARE
Problem: Pain:  Goal: Pain level will decrease  Description: Pain level will decrease  4/5/2021 1336 by Kenyatta Benz RN  Outcome: Completed  4/5/2021 0057 by Roman Fuentes RN  Outcome: Met This Shift  Goal: Control of acute pain  Description: Control of acute pain  4/5/2021 1336 by Kenyatta Benz RN  Outcome: Completed  4/5/2021 0057 by Roman Fuentes RN  Outcome: Met This Shift  Goal: Control of chronic pain  Description: Control of chronic pain  4/5/2021 1336 by Kenyatta Benz RN  Outcome: Completed  4/5/2021 0057 by Roman Fuentes RN  Outcome: Met This Shift

## 2021-04-05 NOTE — PROGRESS NOTES
Hospitalist Progress Note      SYNOPSIS: Patient admitted on 4/3/2021 presenting to the ED for pharyngitis. Gradual onset over the past 2 weeks since L sided dental extraction on 3/17/21. Progressively worsening, severe, worse w/ swallowing, talking, or opening mouth. Odynophagia, some dysphagia but tolerating oral secretions. No associated fever or chills. +nausea. No vomiting. She went to an urgent care and was then referred to the emergency department, CT scan showed fluid collection concerning for abscess likely odontogenic, oral surgeon Dr. Stacey Rockwell was consulted at [facility] who recommended transferring patient to our facility, patient was started on Decadron and Unasyn and was then transferred to [facility]. SUBJECTIVE:  Stable overnight. No other overnight issues reported. Patient seen and examined  Records reviewed. She states she feels the swelling has improved  Having no SOB or difficulty eating      Temp (24hrs), Av.9 °F (36.6 °C), Min:97.7 °F (36.5 °C), Max:98 °F (36.7 °C)    DIET: DIET DENTAL SOFT;  CODE: Full Code  No intake or output data in the 24 hours ending 21 0818    Review of Systems  All bolded are positive; please see HPI  General:  Fever, chills, diaphoresis, fatigue, malaise, night sweats, weight loss  Psychological:  Anxiety, disorientation, hallucinations. ENT:  Epistaxis, headaches, vertigo, visual changes. Cardiovascular:  Chest pain, irregular heartbeats, palpitations, paroxysmal nocturnal dyspnea. Respiratory:  Shortness of breath, coughing, sputum production, hemoptysis, wheezing, orthopnea.   Gastrointestinal:  Nausea, vomiting, diarrhea, heartburn, constipation, abdominal pain, hematemesis, hematochezia, melena, acholic stools  Genito-Urinary:  Dysuria, urgency, frequency, hematuria  Musculoskeletal:  Joint pain, joint stiffness, joint swelling, muscle pain  Neurology:  Headache, focal neurological deficits, weakness, numbness, paresthesia  Derm:  Rashes, ulcers, excoriations, bruising  Extremities:  Decreased ROM, peripheral edema, mottling      OBJECTIVE:    BP (!) 142/82   Pulse 92   Temp 97.9 °F (36.6 °C) (Temporal)   Resp 18   Ht 5' 5\" (1.651 m)   Wt 193 lb 3.2 oz (87.6 kg)   LMP 07/16/2018 Comment: always irregular  SpO2 99%   BMI 32.15 kg/m²     General appearance:  awake, alert, and oriented to person, place, time, and purpose; appears stated age and cooperative; no apparent distress no labored breathing  HEENT:  Conjunctivae/corneas clear. Neck: Supple. No jugular venous distention. Respiratory: symmetrical; clear to auscultation bilaterally; no wheezes; no rhonchi; no rales  Cardiovascular: rhythm regular; rate controlled; no murmurs  Abdomen: Soft, nontender, nondistended  Extremities:  peripheral pulses present; no peripheral edema; no ulcers  Musculoskeletal: No clubbing, cyanosis, no bilateral lower extremity edema. Brisk capillary refill. Skin:  No rashes  on visible skin  Neurologic: awake, alert and following commands     ASSESSMENT and PLAN:  1. Acute left mandible abscess within the left retromolar trigone, the collection measures 16 x 16 x 2.7 cm- IV Unasyn and Decadron. Oral surgery following. Possible dc soon pending oral surgery plan  2. Cervical lymphadenopathy  3. Leukocytosis  4.  Hyperglycemia- on steriods, hemoglobin A1c 6.1         DISPOSITION: Continue current plan of care    Medications:  REVIEWED DAILY    Infusion Medications    sodium chloride       Scheduled Medications    sodium chloride flush  10 mL Intravenous 2 times per day    enoxaparin  40 mg Subcutaneous Daily    dexamethasone  4 mg Intravenous Q6H    chlorhexidine  15 mL Mouth/Throat BID    ampicillin-sulbactam  3,000 mg Intravenous Q6H     PRN Meds: sodium chloride flush, sodium chloride, promethazine **OR** ondansetron, polyethylene glycol, [DISCONTINUED] acetaminophen **OR** acetaminophen, morphine, acetaminophen    Labs: Recent Labs     04/03/21  1730 04/04/21  0830 04/05/21  0600   WBC 17.2* 15.4* 18.9*   HGB 12.7 13.0 12.8   HCT 37.2 38.1 38.6    302 332       Recent Labs     04/03/21  1730 04/04/21  0830 04/05/21  0600    136 130*   K 3.6 3.8 3.8    102 103   CO2 22 22 23   BUN 10 7 8   CREATININE 0.8 0.7 0.7   CALCIUM 9.1 9.2 8.8       No results for input(s): PROT, ALB, ALKPHOS, ALT, AST, BILITOT, AMYLASE, LIPASE in the last 72 hours. No results for input(s): INR in the last 72 hours. No results for input(s): Judi Castor in the last 72 hours. Chronic labs:    Lab Results   Component Value Date    LABA1C 6.1 (H) 04/05/2021       Radiology: REVIEWED DAILY    +++++++++++++++++++++++++++++++++++++++++++++++++  [address], [place]  +++++++++++++++++++++++++++++++++++++++++++++++++  NOTE: This report was transcribed using voice recognition software. Every effort was made to ensure accuracy; however, inadvertent computerized transcription errors may be present.

## 2021-04-05 NOTE — PROGRESS NOTES
Patient discharged to home. All belongings sent with her and daughter present, ambulatory to main entrance. [facility]

## 2021-04-05 NOTE — CARE COORDINATION
Care Coordination- The patient had an extraction of the left third molar on 3/17/21 and post op developed worsening pain and swelling on the lower left and was admitted due to a left submandibular space abcess. She was started on Iv unasyn 3000mg iv q6 hrs. Per Dr Ibarra Other she is feeling much better this am and able to open her mouth wide. Plan is home on po augmenton 875 mg bid. follow up in the office next week.  Plan is home I will follow

## 2021-05-23 ENCOUNTER — HOSPITAL ENCOUNTER (EMERGENCY)
Age: 46
Discharge: HOME | End: 2021-05-23
Attending: EMERGENCY MEDICINE
Payer: MEDICAID

## 2021-05-23 VITALS
HEART RATE: 82 BPM | TEMPERATURE: 98.3 F | BODY MASS INDEX: 32.99 KG/M2 | DIASTOLIC BLOOD PRESSURE: 90 MMHG | WEIGHT: 198 LBS | SYSTOLIC BLOOD PRESSURE: 134 MMHG | HEIGHT: 65 IN | OXYGEN SATURATION: 97 % | RESPIRATION RATE: 20 BRPM

## 2021-05-23 DIAGNOSIS — F19.10 POLYSUBSTANCE ABUSE (HCC): ICD-10-CM

## 2021-05-23 DIAGNOSIS — R11.2 NON-INTRACTABLE VOMITING WITH NAUSEA, UNSPECIFIED VOMITING TYPE: Primary | ICD-10-CM

## 2021-05-23 PROCEDURE — 99284 EMERGENCY DEPT VISIT MOD MDM: CPT

## 2021-05-23 ASSESSMENT — ENCOUNTER SYMPTOMS
SHORTNESS OF BREATH: 0
NAUSEA: 1
SORE THROAT: 0
BACK PAIN: 0
ABDOMINAL DISTENTION: 0
VOMITING: 1
COUGH: 0
WHEEZING: 0
EYE PAIN: 0
EYE DISCHARGE: 0
DIARRHEA: 0
EYE REDNESS: 0
SINUS PRESSURE: 0

## 2021-05-23 NOTE — ED PROVIDER NOTES
Patient is a 40 y/o female who presents to the ED via EMS with nausea and vomiting. Patient states that she became nauseated and was vomiting tonight. She took nausea medication and then felt like she could not vomit. She then attempted to make herself vomit by sticking her fingers down her throat. She admits to using cocaine, marijuana and drinking absolut vodka tonight. She denies any suicidal intent. Currently, she denies any nausea. She denies any abdominal pain. She states \"I'm just cold. \"           Review of Systems   Constitutional: Negative for chills and fever. HENT: Negative for ear pain, sinus pressure and sore throat. Eyes: Negative for pain, discharge and redness. Respiratory: Negative for cough, shortness of breath and wheezing. Cardiovascular: Negative for chest pain. Gastrointestinal: Positive for nausea and vomiting. Negative for abdominal distention and diarrhea. Genitourinary: Negative for dysuria and frequency. Musculoskeletal: Negative for arthralgias and back pain. Skin: Negative for rash and wound. Neurological: Negative for weakness and headaches. Hematological: Negative for adenopathy. All other systems reviewed and are negative. Physical Exam  Vitals and nursing note reviewed. Constitutional:       General: She is not in acute distress. Appearance: She is obese. HENT:      Head: Normocephalic and atraumatic. Right Ear: External ear normal.      Left Ear: External ear normal.      Nose: Nose normal.      Mouth/Throat:      Mouth: Mucous membranes are moist.   Eyes:      Conjunctiva/sclera: Conjunctivae normal.      Pupils: Pupils are equal, round, and reactive to light. Cardiovascular:      Rate and Rhythm: Normal rate and regular rhythm. Heart sounds: No murmur heard. Pulmonary:      Effort: Pulmonary effort is normal. No respiratory distress. Breath sounds: Normal breath sounds. No stridor. No wheezing, rhonchi or rales.

## 2021-05-23 NOTE — ED NOTES
Bed: 09  Expected date:   Expected time:   Means of arrival:   Comments:  reagan Lazo RN  05/23/21 [id number]

## 2021-05-23 NOTE — ED NOTES
Patient was given 4mg narcan prior to arrival, 2mg IN and 2mg IV, per EMS upon arrival unknown white powder at scene, upon arrival to ED patient states she had done cocaine as well as \"smoke weed and drink absolute\"     Silvino Smith RN  05/23/21 [id number]

## 2021-06-28 ENCOUNTER — HOSPITAL ENCOUNTER (EMERGENCY)
Age: 46
Discharge: HOME | End: 2021-06-28
Attending: EMERGENCY MEDICINE
Payer: MEDICAID

## 2021-06-28 ENCOUNTER — APPOINTMENT (OUTPATIENT)
Dept: GENERAL RADIOLOGY | Age: 46
End: 2021-06-28
Payer: MEDICAID

## 2021-06-28 VITALS
HEIGHT: 65 IN | BODY MASS INDEX: 31.65 KG/M2 | HEART RATE: 84 BPM | SYSTOLIC BLOOD PRESSURE: 115 MMHG | WEIGHT: 190 LBS | OXYGEN SATURATION: 99 % | TEMPERATURE: 97.5 F | DIASTOLIC BLOOD PRESSURE: 78 MMHG | RESPIRATION RATE: 16 BRPM

## 2021-06-28 DIAGNOSIS — S23.9XXA THORACIC SPRAIN: ICD-10-CM
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
## 2021-06-28 DIAGNOSIS — S33.5XXA LUMBAR SPRAIN, INITIAL ENCOUNTER: Primary | ICD-10-CM

## 2021-06-28 PROCEDURE — 6370000000 HC RX 637 (ALT 250 FOR IP): Performed by: EMERGENCY MEDICINE

## 2021-06-28 PROCEDURE — 72100 X-RAY EXAM L-S SPINE 2/3 VWS: CPT

## 2021-06-28 PROCEDURE — 99283 EMERGENCY DEPT VISIT LOW MDM: CPT

## 2021-06-28 PROCEDURE — 72072 X-RAY EXAM THORAC SPINE 3VWS: CPT

## 2021-06-28 RX ORDER — CYCLOBENZAPRINE HCL 10 MG
10 TABLET ORAL 3 TIMES DAILY PRN
Qty: 15 TABLET | Refills: 0 | Status: SHIPPED | OUTPATIENT
Start: 2021-06-28 | End: 2021-07-03

## 2021-06-28 RX ORDER — IBUPROFEN 600 MG/1
600 TABLET ORAL EVERY 8 HOURS PRN
Qty: 30 TABLET | Refills: 0 | Status: SHIPPED | OUTPATIENT
Start: 2021-06-28 | End: 2021-07-08

## 2021-06-28 RX ORDER — ACETAMINOPHEN 500 MG
1000 TABLET ORAL ONCE
Status: COMPLETED | OUTPATIENT
Start: 2021-06-28 | End: 2021-06-28

## 2021-06-28 RX ADMIN — ACETAMINOPHEN 1000 MG: 500 TABLET ORAL at 18:08

## 2021-06-28 ASSESSMENT — PAIN DESCRIPTION - FREQUENCY: FREQUENCY: CONTINUOUS

## 2021-06-28 ASSESSMENT — ENCOUNTER SYMPTOMS
WHEEZING: 0
EYE DISCHARGE: 0
SHORTNESS OF BREATH: 0
ABDOMINAL DISTENTION: 0
NAUSEA: 0
EYE PAIN: 0
DIARRHEA: 0
BACK PAIN: 0
SINUS PRESSURE: 0
SORE THROAT: 0
VOMITING: 0
COUGH: 0
EYE REDNESS: 0

## 2021-06-28 ASSESSMENT — PAIN DESCRIPTION - PROGRESSION: CLINICAL_PROGRESSION: NOT CHANGED

## 2021-06-28 ASSESSMENT — PAIN SCALES - GENERAL: PAINLEVEL_OUTOF10: 6

## 2021-06-28 ASSESSMENT — PAIN DESCRIPTION - ORIENTATION: ORIENTATION: RIGHT;LOWER;MID

## 2021-06-28 ASSESSMENT — PAIN DESCRIPTION - PAIN TYPE: TYPE: ACUTE PAIN

## 2021-06-28 ASSESSMENT — PAIN DESCRIPTION - ONSET: ONSET: ON-GOING

## 2021-06-28 ASSESSMENT — PAIN DESCRIPTION - LOCATION: LOCATION: BACK

## 2021-06-28 ASSESSMENT — PAIN DESCRIPTION - DESCRIPTORS: DESCRIPTORS: SHARP
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
## 2021-09-30 ENCOUNTER — HOSPITAL ENCOUNTER (EMERGENCY)
Age: 46
Discharge: HOME | End: 2021-09-30
Attending: EMERGENCY MEDICINE
Payer: MEDICAID

## 2021-09-30 VITALS
SYSTOLIC BLOOD PRESSURE: 127 MMHG | RESPIRATION RATE: 16 BRPM | BODY MASS INDEX: 32.12 KG/M2 | OXYGEN SATURATION: 98 % | DIASTOLIC BLOOD PRESSURE: 85 MMHG | TEMPERATURE: 97.3 F | HEART RATE: 80 BPM | WEIGHT: 193 LBS

## 2021-09-30 DIAGNOSIS — Z20.2 STD EXPOSURE: Primary | ICD-10-CM

## 2021-09-30 LAB
BACTERIA: ABNORMAL /HPF
BILIRUBIN URINE: NEGATIVE
BLOOD, URINE: NEGATIVE
CLARITY: CLEAR
COLOR: YELLOW
EPITHELIAL CELLS, UA: ABNORMAL /HPF
GLUCOSE URINE: NEGATIVE MG/DL
KETONES, URINE: NEGATIVE MG/DL
LEUKOCYTE ESTERASE, URINE: ABNORMAL
NITRITE, URINE: NEGATIVE
PH UA: 5.5 (ref 5–9)
PROTEIN UA: NEGATIVE MG/DL
RBC UA: ABNORMAL /HPF (ref 0–2)
SPECIFIC GRAVITY UA: 1.02 (ref 1–1.03)
UROBILINOGEN, URINE: 0.2 E.U./DL
WBC UA: ABNORMAL /HPF (ref 0–5)

## 2021-09-30 PROCEDURE — 87491 CHLMYD TRACH DNA AMP PROBE: CPT

## 2021-09-30 PROCEDURE — 81001 URINALYSIS AUTO W/SCOPE: CPT

## 2021-09-30 PROCEDURE — 99282 EMERGENCY DEPT VISIT SF MDM: CPT

## 2021-09-30 PROCEDURE — 96372 THER/PROPH/DIAG INJ SC/IM: CPT

## 2021-09-30 PROCEDURE — 87591 N.GONORRHOEAE DNA AMP PROB: CPT

## 2021-09-30 PROCEDURE — 6360000002 HC RX W HCPCS: Performed by: EMERGENCY MEDICINE

## 2021-09-30 RX ORDER — METRONIDAZOLE 500 MG/1
500 TABLET ORAL 2 TIMES DAILY
Qty: 20 TABLET | Refills: 0 | Status: SHIPPED | OUTPATIENT
Start: 2021-09-30 | End: 2021-10-10

## 2021-09-30 RX ORDER — DOXYCYCLINE HYCLATE 100 MG
100 TABLET ORAL 2 TIMES DAILY
Qty: 20 TABLET | Refills: 0 | Status: SHIPPED | OUTPATIENT
Start: 2021-09-30 | End: 2021-10-10

## 2021-09-30 RX ORDER — CEFTRIAXONE 500 MG/1
500 INJECTION, POWDER, FOR SOLUTION INTRAMUSCULAR; INTRAVENOUS ONCE
Status: COMPLETED | OUTPATIENT
Start: 2021-09-30 | End: 2021-09-30

## 2021-09-30 RX ORDER — CEFTRIAXONE 1 G/1
1000 INJECTION, POWDER, FOR SOLUTION INTRAMUSCULAR; INTRAVENOUS ONCE
Status: COMPLETED | OUTPATIENT
Start: 2021-09-30 | End: 2021-09-30

## 2021-09-30 RX ADMIN — CEFTRIAXONE SODIUM 1000 MG: 1 INJECTION, POWDER, FOR SOLUTION INTRAMUSCULAR; INTRAVENOUS at 10:35

## 2021-09-30 RX ADMIN — CEFTRIAXONE SODIUM 500 MG: 500 INJECTION, POWDER, FOR SOLUTION INTRAMUSCULAR; INTRAVENOUS at 10:38

## 2021-09-30 ASSESSMENT — ENCOUNTER SYMPTOMS
SHORTNESS OF BREATH: 0
VOMITING: 0
NAUSEA: 0
SINUS PRESSURE: 0
COUGH: 0
ABDOMINAL DISTENTION: 0
SORE THROAT: 0
DIARRHEA: 0
WHEEZING: 0
BACK PAIN: 0
EYE PAIN: 0
EYE DISCHARGE: 0
EYE REDNESS: 0

## 2021-09-30 ASSESSMENT — PAIN DESCRIPTION - ORIENTATION: ORIENTATION: LOWER;MID

## 2021-09-30 ASSESSMENT — PAIN DESCRIPTION - DESCRIPTORS: DESCRIPTORS: CRAMPING

## 2021-09-30 ASSESSMENT — PAIN DESCRIPTION - PAIN TYPE: TYPE: ACUTE PAIN

## 2021-09-30 ASSESSMENT — PAIN DESCRIPTION - LOCATION: LOCATION: ABDOMEN

## 2021-09-30 ASSESSMENT — PAIN SCALES - GENERAL: PAINLEVEL_OUTOF10: 3

## 2021-09-30 NOTE — ED PROVIDER NOTES
Vaginal discharge, multiple sexual partners    The history is provided by the patient. Female  Problem  Pain severity:  No pain  Timing:  Constant  Progression:  Worsening  Chronicity:  New  Recent urinary tract infections: no    Relieved by:  Nothing  Worsened by:  Nothing  Ineffective treatments:  None tried  Associated symptoms: vaginal discharge    Associated symptoms: no fever, no nausea and no vomiting    Risk factors: sexually active and sexually transmitted infections         Review of Systems   Constitutional: Negative for chills and fever. HENT: Negative for ear pain, sinus pressure and sore throat. Eyes: Negative for pain, discharge and redness. Respiratory: Negative for cough, shortness of breath and wheezing. Cardiovascular: Negative for chest pain. Gastrointestinal: Negative for abdominal distention, diarrhea, nausea and vomiting. Genitourinary: Positive for pelvic pain and vaginal discharge. Negative for dysuria and frequency. Musculoskeletal: Negative for arthralgias and back pain. Skin: Negative for rash and wound. Neurological: Negative for weakness and headaches. Hematological: Negative for adenopathy. Psychiatric/Behavioral: Negative. All other systems reviewed and are negative. Physical Exam  Vitals and nursing note reviewed. Constitutional:       Appearance: She is well-developed. HENT:      Head: Normocephalic and atraumatic. Right Ear: Tympanic membrane normal.      Left Ear: Tympanic membrane normal.   Eyes:      Pupils: Pupils are equal, round, and reactive to light. Cardiovascular:      Rate and Rhythm: Normal rate and regular rhythm. Heart sounds: Normal heart sounds. No murmur heard. Pulmonary:      Effort: Pulmonary effort is normal.      Breath sounds: Normal breath sounds. Abdominal:      General: Bowel sounds are normal.      Palpations: Abdomen is soft. Tenderness: There is no abdominal tenderness.  There is no guarding or rebound. Musculoskeletal:      Cervical back: Normal range of motion and neck supple. Skin:     General: Skin is warm and dry. Neurological:      Mental Status: She is alert and oriented to person, place, and time. Psychiatric:         Behavior: Behavior normal.         Thought Content: Thought content normal.         Judgment: Judgment normal.        --------------------------------------------- PAST HISTORY ---------------------------------------------  Past Medical History:  has a past medical history of Abnormal Pap smear, Asthma, Diabetes mellitus ([facility] [address] Dizziness, Headache, Hypertension, Memory difficulties, and Vertigo. Past Surgical History:  has a past surgical history that includes  section; Tubal ligation (); pelvic laparoscopy (); Dilation & curettage (11); hysteroscopy (11); Endometrial ablation (11); Tonsillectomy; LEEP (2012); Endometrial biopsy; and pr total abdom hysterectomy (N/A, 2018). Social History:  reports that she has been smoking. She has never used smokeless tobacco. She reports current alcohol use of about 1.0 standard drinks of alcohol per week. She reports current drug use. Drugs: Marijuana and Cocaine. Family History: family history includes Breast Cancer in her maternal aunt; Cancer in her father; Diabetes in her brother; Hypertension in her father and mother; Lupus in her mother; Sickle Cell Anemia in her daughter; Sickle Cell Trait in her daughter. The patients home medications have been reviewed.     Allergies: Aspirin    -------------------------------------------------- RESULTS -------------------------------------------------  Results for orders placed or performed during the hospital encounter of 21   C.trachomatis N.gonorrhoeae DNA, Urine    Specimen: Urine   Result Value Ref Range    Source Urine    Urinalysis with Microscopic   Result Value Ref Range    Color, UA Yellow Straw/Yellow    Clarity, UA Clear Clear    Glucose, Ur Negative Negative mg/dL    Bilirubin Urine Negative Negative    Ketones, Urine Negative Negative mg/dL    Specific Gravity, UA 1.020 1.005 - 1.030    Blood, Urine Negative Negative    pH, UA 5.5 5.0 - 9.0    Protein, UA Negative Negative mg/dL    Urobilinogen, Urine 0.2 <2.0 E.U./dL    Nitrite, Urine Negative Negative    Leukocyte Esterase, Urine TRACE (A) Negative    WBC, UA 5-10 (A) 0 - 5 /HPF    RBC, UA 1-3 0 - 2 /HPF    Epithelial Cells, UA FEW /HPF    Bacteria, UA MODERATE (A) None Seen /HPF     No orders to display       ------------------------- NURSING NOTES AND VITALS REVIEWED ---------------------------   The nursing notes within the ED encounter and vital signs as below have been reviewed. /85   Pulse 80   Temp 97.3 °F (36.3 °C) (Temporal)   Resp 16   Wt 193 lb (87.5 kg)   LMP 07/16/2018 Comment: always irregular  SpO2 98%   BMI 32.12 kg/m²   Oxygen Saturation Interpretation: Normal      ------------------------------------------ PROGRESS NOTES ------------------------------------------   I have spoken with the patient and discussed todays results, in addition to providing specific details for the plan of care and counseling regarding the diagnosis and prognosis. Their questions are answered at this time and they are agreeable with the plan.      --------------------------------- ADDITIONAL PROVIDER NOTES ---------------------------------        This patient is stable for discharge. I have shared the specific conditions for return, as well as the importance of follow-up. IMPRESSION:     1.  STD exposure      Patient's Medications   New Prescriptions    DOXYCYCLINE HYCLATE (VIBRA-TABS) 100 MG TABLET    Take 1 tablet by mouth 2 times daily for 10 days    METRONIDAZOLE (FLAGYL) 500 MG TABLET    Take 1 tablet by mouth 2 times daily for 10 days   Previous Medications    IBUPROFEN (IBU) 600 MG TABLET    Take 1 tablet by mouth every 8 hours as needed for Pain Modified Medications    No medications on file   Discontinued Medications    No medications on file         Procedures     [facility]                  Yvan Green,   09/30/21 [id number]

## 2021-10-04 LAB
C. TRACHOMATIS DNA ,URINE: NEGATIVE
N. GONORRHOEAE DNA, URINE: NEGATIVE
SOURCE: NORMAL

## 2022-12-02 NOTE — PRE-CERTIFICATION NOTE
[address] [facility]                                                                                                                    PRE OP INSTRUCTIONS FOR  Caitlin Espinosa        Date: 12/2/2022    Date of surgery: 12/5/22   Arrival Time: 1000  Nothing by mouth (NPO) as instructed.____________________________________________________________________    Take the following medications with a small sip of water on the morning of Surgery: none     Diabetics may take evening dose of insulin but none after midnight. If you feel symptomatic or low blood sugar morning of surgery drink 1-2 ounces of apple juice only. Aspirin, Ibuprofen, Advil, Naproxen, Vitamin E and other Anti-inflammatory products should be stopped  before surgery  as directed by your physician. Take Tylenol only unless instructed otherwise by your surgeon. Check with your Doctor regarding stopping Plavix, Coumadin, Lovenox, Eliquis, Effient, or other blood thinners. Do not smoke,use illicit drugs and do not drink any alcoholic beverages 24 hours prior to surgery. You may brush your teeth the morning of surgery. DO NOT SWALLOW WATER    You MUST make arrangements for a responsible adult to take you home after your surgery. You will not be allowed to leave alone or drive yourself home. It is strongly suggested someone stay with you the first 24 hrs. Your surgery will be cancelled if you do not have a ride home. PEDIATRIC PATIENTS ONLY:  A parent/legal guardian must accompany a child scheduled for surgery and plan to stay at the hospital until the child is discharged. Please do not bring other children with you. Please wear simple, loose fitting clothing to the hospital.  Florentino Nguyenaltagracia not bring valuables (money, credit cards, checkbooks, etc.) Do not wear any makeup (including no eye makeup) or nail polish on your fingers or toes. DO NOT wear any jewelry or piercings on day of surgery.   All body piercing jewelry must be removed. Shower the night before surgery with ___Antibacterial soap /EMILIANO WIPES________    TOTAL JOINT REPLACEMENT/HYSTERECTOMY PATIENTS ONLY---Remember to bring Blood Bank bracelet to the hospital on the day of surgery. If you have a Living Will and Durable Power of  for Healthcare, please bring in a copy. If appropriate bring crutches, inspirex, WALKER, CANE etc... Notify your Surgeon if you develop any illness between now and surgery time, cough, cold, fever, sore throat, nausea, vomiting, etc.  Please notify your surgeon if you experience dizziness, shortness of breath or blurred vision between now & the time of your surgery. If you have ___dentures, they will be removed before going to the OR; we will provide you a container. If you wear ___contact lenses or ___glasses, they will be removed; please bring a case for them. To provide excellent care visitors will be limited to 2 in the room at any given time. Please bring picture ID and insurance card. During flu season no children under the age of 15 are permitted in the hospital for the safety of all patients. Other check in at the  in the main lobby                 Please call AMBULATORY CARE if you have any further questions.    [address] [facility]     [address]

## 2022-12-05 ENCOUNTER — ANESTHESIA EVENT (OUTPATIENT)
Dept: OPERATING ROOM | Age: 47
End: 2022-12-05
Payer: MEDICAID

## 2022-12-05 ENCOUNTER — HOSPITAL ENCOUNTER (OUTPATIENT)
Dept: GENERAL RADIOLOGY | Age: 47
Discharge: HOME | End: 2022-12-07
Attending: PODIATRIST
Payer: MEDICAID

## 2022-12-05 ENCOUNTER — ANESTHESIA (OUTPATIENT)
Dept: OPERATING ROOM | Age: 47
End: 2022-12-05
Payer: MEDICAID

## 2022-12-05 ENCOUNTER — HOSPITAL ENCOUNTER (INPATIENT)
Age: 47
LOS: 4 days | Discharge: NURSING, REHAB OR LONG-TERM CARE | DRG: 850 | End: 2022-12-10
Attending: PODIATRIST | Admitting: INTERNAL MEDICINE
Payer: MEDICAID

## 2022-12-05 DIAGNOSIS — M79.671 RIGHT FOOT PAIN: ICD-10-CM

## 2022-12-05 DIAGNOSIS — M76.61 TENDONITIS, ACHILLES, RIGHT: ICD-10-CM

## 2022-12-05 DIAGNOSIS — M66.871 NONTRAUMATIC TEAR OF TIBIALIS POSTERIOR TENDON, RIGHT: ICD-10-CM

## 2022-12-05 DIAGNOSIS — R52 PAIN: ICD-10-CM

## 2022-12-05 DIAGNOSIS — G89.18 POSTOPERATIVE PAIN: Primary | ICD-10-CM

## 2022-12-05 LAB
ANION GAP SERPL CALCULATED.3IONS-SCNC: 11 MMOL/L (ref 7–16)
BUN BLDV-MCNC: 9 MG/DL (ref 6–20)
CALCIUM SERPL-MCNC: 8.9 MG/DL (ref 8.6–10.2)
CHLORIDE BLD-SCNC: 105 MMOL/L (ref 98–107)
CO2: 23 MMOL/L (ref 22–29)
CREAT SERPL-MCNC: 0.7 MG/DL (ref 0.5–1)
EKG ATRIAL RATE: 86 BPM
EKG P AXIS: 66 DEGREES
EKG P-R INTERVAL: 178 MS
EKG Q-T INTERVAL: 386 MS
EKG QRS DURATION: 74 MS
EKG QTC CALCULATION (BAZETT): 461 MS
EKG R AXIS: 40 DEGREES
EKG T AXIS: 29 DEGREES
EKG VENTRICULAR RATE: 86 BPM
GFR SERPL CREATININE-BSD FRML MDRD: >60 ML/MIN/1.73
GLUCOSE BLD-MCNC: 100 MG/DL (ref 74–99)
HCT VFR BLD CALC: 38.5 % (ref 34–48)
HEMOGLOBIN: 12.9 G/DL (ref 11.5–15.5)
MCH RBC QN AUTO: 26.9 PG (ref 26–35)
MCHC RBC AUTO-ENTMCNC: 33.5 % (ref 32–34.5)
MCV RBC AUTO: 80.2 FL (ref 80–99.9)
METER GLUCOSE: 201 MG/DL (ref 74–99)
PDW BLD-RTO: 12.3 FL (ref 11.5–15)
PLATELET # BLD: 274 E9/L (ref 130–450)
PMV BLD AUTO: 9.2 FL (ref 7–12)
POTASSIUM REFLEX MAGNESIUM: 3.6 MMOL/L (ref 3.5–5)
RBC # BLD: 4.8 E12/L (ref 3.5–5.5)
SODIUM BLD-SCNC: 139 MMOL/L (ref 132–146)
WBC # BLD: 8.5 E9/L (ref 4.5–11.5)

## 2022-12-05 PROCEDURE — G0378 HOSPITAL OBSERVATION PER HR: HCPCS

## 2022-12-05 PROCEDURE — 3700000001 HC ADD 15 MINUTES (ANESTHESIA): Performed by: PODIATRIST

## 2022-12-05 PROCEDURE — 6360000002 HC RX W HCPCS: Performed by: PODIATRIST

## 2022-12-05 PROCEDURE — 3209999900 FLUORO FOR SURGICAL PROCEDURES

## 2022-12-05 PROCEDURE — 7100000011 HC PHASE II RECOVERY - ADDTL 15 MIN: Performed by: PODIATRIST

## 2022-12-05 PROCEDURE — 85027 COMPLETE CBC AUTOMATED: CPT

## 2022-12-05 PROCEDURE — 0LXS0ZZ TRANSFER RIGHT ANKLE TENDON, OPEN APPROACH: ICD-10-PCS | Performed by: PODIATRIST

## 2022-12-05 PROCEDURE — 2720000010 HC SURG SUPPLY STERILE: Performed by: PODIATRIST

## 2022-12-05 PROCEDURE — 36415 COLL VENOUS BLD VENIPUNCTURE: CPT

## 2022-12-05 PROCEDURE — 88304 TISSUE EXAM BY PATHOLOGIST: CPT

## 2022-12-05 PROCEDURE — 80048 BASIC METABOLIC PNL TOTAL CA: CPT

## 2022-12-05 PROCEDURE — 7100000001 HC PACU RECOVERY - ADDTL 15 MIN: Performed by: PODIATRIST

## 2022-12-05 PROCEDURE — 7100000010 HC PHASE II RECOVERY - FIRST 15 MIN: Performed by: PODIATRIST

## 2022-12-05 PROCEDURE — 6360000002 HC RX W HCPCS: Performed by: NURSE ANESTHETIST, CERTIFIED REGISTERED

## 2022-12-05 PROCEDURE — 3600000003 HC SURGERY LEVEL 3 BASE: Performed by: PODIATRIST

## 2022-12-05 PROCEDURE — 6360000002 HC RX W HCPCS

## 2022-12-05 PROCEDURE — 2500000003 HC RX 250 WO HCPCS: Performed by: PODIATRIST

## 2022-12-05 PROCEDURE — 2580000003 HC RX 258: Performed by: INTERNAL MEDICINE

## 2022-12-05 PROCEDURE — 2580000003 HC RX 258: Performed by: PODIATRIST

## 2022-12-05 PROCEDURE — 7100000000 HC PACU RECOVERY - FIRST 15 MIN: Performed by: PODIATRIST

## 2022-12-05 PROCEDURE — 2500000003 HC RX 250 WO HCPCS: Performed by: NURSE ANESTHETIST, CERTIFIED REGISTERED

## 2022-12-05 PROCEDURE — 3700000000 HC ANESTHESIA ATTENDED CARE: Performed by: PODIATRIST

## 2022-12-05 PROCEDURE — 93005 ELECTROCARDIOGRAM TRACING: CPT | Performed by: ANESTHESIOLOGY

## 2022-12-05 PROCEDURE — 2709999900 HC NON-CHARGEABLE SUPPLY: Performed by: PODIATRIST

## 2022-12-05 PROCEDURE — C1713 ANCHOR/SCREW BN/BN,TIS/BN: HCPCS | Performed by: PODIATRIST

## 2022-12-05 PROCEDURE — 3600000013 HC SURGERY LEVEL 3 ADDTL 15MIN: Performed by: PODIATRIST

## 2022-12-05 PROCEDURE — 82962 GLUCOSE BLOOD TEST: CPT

## 2022-12-05 PROCEDURE — 6360000002 HC RX W HCPCS: Performed by: ANESTHESIOLOGY

## 2022-12-05 PROCEDURE — 6360000002 HC RX W HCPCS: Performed by: INTERNAL MEDICINE

## 2022-12-05 PROCEDURE — 0QBL0ZZ EXCISION OF RIGHT TARSAL, OPEN APPROACH: ICD-10-PCS | Performed by: PODIATRIST

## 2022-12-05 PROCEDURE — 2580000003 HC RX 258: Performed by: ANESTHESIOLOGY

## 2022-12-05 DEVICE — FHL IMPLANT SYSTEM, 6.25 MM
Type: IMPLANTABLE DEVICE | Site: HEEL | Status: FUNCTIONAL
Brand: ARTHREX®

## 2022-12-05 RX ORDER — MEPERIDINE HYDROCHLORIDE 25 MG/ML
INJECTION INTRAMUSCULAR; INTRAVENOUS; SUBCUTANEOUS
Status: COMPLETED
Start: 2022-12-05 | End: 2022-12-05

## 2022-12-05 RX ORDER — HYDROCODONE BITARTRATE AND ACETAMINOPHEN 5; 325 MG/1; MG/1
1 TABLET ORAL EVERY 4 HOURS PRN
Qty: 42 TABLET | Refills: 0 | Status: SHIPPED | OUTPATIENT
Start: 2022-12-05 | End: 2022-12-12

## 2022-12-05 RX ORDER — PROMETHAZINE HYDROCHLORIDE 25 MG/1
12.5 TABLET ORAL EVERY 6 HOURS PRN
Status: DISCONTINUED | OUTPATIENT
Start: 2022-12-05 | End: 2022-12-10 | Stop reason: HOSPADM

## 2022-12-05 RX ORDER — BUPIVACAINE HYDROCHLORIDE 5 MG/ML
INJECTION, SOLUTION EPIDURAL; INTRACAUDAL PRN
Status: DISCONTINUED | OUTPATIENT
Start: 2022-12-05 | End: 2022-12-05 | Stop reason: ALTCHOICE

## 2022-12-05 RX ORDER — SODIUM CHLORIDE 9 MG/ML
INJECTION, SOLUTION INTRAVENOUS PRN
Status: DISCONTINUED | OUTPATIENT
Start: 2022-12-05 | End: 2022-12-05 | Stop reason: HOSPADM

## 2022-12-05 RX ORDER — ENOXAPARIN SODIUM 100 MG/ML
40 INJECTION SUBCUTANEOUS DAILY
Status: DISCONTINUED | OUTPATIENT
Start: 2022-12-05 | End: 2022-12-10 | Stop reason: HOSPADM

## 2022-12-05 RX ORDER — PROPOFOL 10 MG/ML
INJECTION, EMULSION INTRAVENOUS PRN
Status: DISCONTINUED | OUTPATIENT
Start: 2022-12-05 | End: 2022-12-05 | Stop reason: SDUPTHER

## 2022-12-05 RX ORDER — DOXYCYCLINE HYCLATE 100 MG
100 TABLET ORAL 2 TIMES DAILY
Qty: 20 TABLET | Refills: 0 | Status: SHIPPED | OUTPATIENT
Start: 2022-12-05 | End: 2022-12-15

## 2022-12-05 RX ORDER — PROCHLORPERAZINE EDISYLATE 5 MG/ML
INJECTION INTRAMUSCULAR; INTRAVENOUS
Status: COMPLETED
Start: 2022-12-05 | End: 2022-12-05

## 2022-12-05 RX ORDER — NEOSTIGMINE METHYLSULFATE 1 MG/ML
INJECTION, SOLUTION INTRAVENOUS PRN
Status: DISCONTINUED | OUTPATIENT
Start: 2022-12-05 | End: 2022-12-05 | Stop reason: SDUPTHER

## 2022-12-05 RX ORDER — ROCURONIUM BROMIDE 10 MG/ML
INJECTION, SOLUTION INTRAVENOUS PRN
Status: DISCONTINUED | OUTPATIENT
Start: 2022-12-05 | End: 2022-12-05 | Stop reason: SDUPTHER

## 2022-12-05 RX ORDER — SODIUM CHLORIDE 0.9 % (FLUSH) 0.9 %
10 SYRINGE (ML) INJECTION EVERY 12 HOURS SCHEDULED
Status: DISCONTINUED | OUTPATIENT
Start: 2022-12-05 | End: 2022-12-10 | Stop reason: HOSPADM

## 2022-12-05 RX ORDER — POLYETHYLENE GLYCOL 3350 17 G/17G
17 POWDER, FOR SOLUTION ORAL DAILY PRN
Status: DISCONTINUED | OUTPATIENT
Start: 2022-12-05 | End: 2022-12-10 | Stop reason: HOSPADM

## 2022-12-05 RX ORDER — OXYCODONE HYDROCHLORIDE 5 MG/1
5 TABLET ORAL PRN
Status: DISCONTINUED | OUTPATIENT
Start: 2022-12-05 | End: 2022-12-05 | Stop reason: HOSPADM

## 2022-12-05 RX ORDER — GLYCOPYRROLATE 0.2 MG/ML
INJECTION INTRAMUSCULAR; INTRAVENOUS PRN
Status: DISCONTINUED | OUTPATIENT
Start: 2022-12-05 | End: 2022-12-05 | Stop reason: SDUPTHER

## 2022-12-05 RX ORDER — SODIUM CHLORIDE 0.9 % (FLUSH) 0.9 %
5-40 SYRINGE (ML) INJECTION PRN
Status: DISCONTINUED | OUTPATIENT
Start: 2022-12-05 | End: 2022-12-05 | Stop reason: HOSPADM

## 2022-12-05 RX ORDER — MIDAZOLAM HYDROCHLORIDE 1 MG/ML
INJECTION INTRAMUSCULAR; INTRAVENOUS PRN
Status: DISCONTINUED | OUTPATIENT
Start: 2022-12-05 | End: 2022-12-05 | Stop reason: SDUPTHER

## 2022-12-05 RX ORDER — SODIUM CHLORIDE 9 MG/ML
INJECTION, SOLUTION INTRAVENOUS CONTINUOUS
Status: DISCONTINUED | OUTPATIENT
Start: 2022-12-05 | End: 2022-12-05 | Stop reason: HOSPADM

## 2022-12-05 RX ORDER — METHOCARBAMOL 100 MG/ML
INJECTION, SOLUTION INTRAMUSCULAR; INTRAVENOUS
Status: COMPLETED
Start: 2022-12-05 | End: 2022-12-05

## 2022-12-05 RX ORDER — ONDANSETRON 2 MG/ML
4 INJECTION INTRAMUSCULAR; INTRAVENOUS EVERY 6 HOURS PRN
Status: DISCONTINUED | OUTPATIENT
Start: 2022-12-05 | End: 2022-12-10 | Stop reason: HOSPADM

## 2022-12-05 RX ORDER — HYDROMORPHONE HYDROCHLORIDE 1 MG/ML
0.5 INJECTION, SOLUTION INTRAMUSCULAR; INTRAVENOUS; SUBCUTANEOUS
Status: DISCONTINUED | OUTPATIENT
Start: 2022-12-05 | End: 2022-12-07

## 2022-12-05 RX ORDER — LIDOCAINE HYDROCHLORIDE 20 MG/ML
INJECTION, SOLUTION INTRAVENOUS PRN
Status: DISCONTINUED | OUTPATIENT
Start: 2022-12-05 | End: 2022-12-05 | Stop reason: SDUPTHER

## 2022-12-05 RX ORDER — SODIUM CHLORIDE 0.9 % (FLUSH) 0.9 %
5-40 SYRINGE (ML) INJECTION EVERY 12 HOURS SCHEDULED
Status: DISCONTINUED | OUTPATIENT
Start: 2022-12-05 | End: 2022-12-05 | Stop reason: HOSPADM

## 2022-12-05 RX ORDER — ONDANSETRON 2 MG/ML
INJECTION INTRAMUSCULAR; INTRAVENOUS PRN
Status: DISCONTINUED | OUTPATIENT
Start: 2022-12-05 | End: 2022-12-05 | Stop reason: SDUPTHER

## 2022-12-05 RX ORDER — SODIUM CHLORIDE 0.9 % (FLUSH) 0.9 %
10 SYRINGE (ML) INJECTION PRN
Status: DISCONTINUED | OUTPATIENT
Start: 2022-12-05 | End: 2022-12-10 | Stop reason: HOSPADM

## 2022-12-05 RX ORDER — FENTANYL CITRATE 50 UG/ML
INJECTION, SOLUTION INTRAMUSCULAR; INTRAVENOUS PRN
Status: DISCONTINUED | OUTPATIENT
Start: 2022-12-05 | End: 2022-12-05 | Stop reason: SDUPTHER

## 2022-12-05 RX ORDER — OXYCODONE HYDROCHLORIDE 5 MG/1
10 TABLET ORAL PRN
Status: DISCONTINUED | OUTPATIENT
Start: 2022-12-05 | End: 2022-12-05 | Stop reason: HOSPADM

## 2022-12-05 RX ORDER — PROCHLORPERAZINE EDISYLATE 5 MG/ML
5 INJECTION INTRAMUSCULAR; INTRAVENOUS
Status: COMPLETED | OUTPATIENT
Start: 2022-12-05 | End: 2022-12-05

## 2022-12-05 RX ORDER — METHOCARBAMOL 100 MG/ML
1000 INJECTION, SOLUTION INTRAMUSCULAR; INTRAVENOUS ONCE
Status: COMPLETED | OUTPATIENT
Start: 2022-12-05 | End: 2022-12-05

## 2022-12-05 RX ORDER — DEXAMETHASONE SODIUM PHOSPHATE 10 MG/ML
INJECTION, SOLUTION INTRAMUSCULAR; INTRAVENOUS PRN
Status: DISCONTINUED | OUTPATIENT
Start: 2022-12-05 | End: 2022-12-05 | Stop reason: SDUPTHER

## 2022-12-05 RX ORDER — ACETAMINOPHEN 325 MG/1
650 TABLET ORAL EVERY 6 HOURS PRN
Status: DISCONTINUED | OUTPATIENT
Start: 2022-12-05 | End: 2022-12-10 | Stop reason: HOSPADM

## 2022-12-05 RX ORDER — ACETAMINOPHEN 650 MG/1
650 SUPPOSITORY RECTAL EVERY 6 HOURS PRN
Status: DISCONTINUED | OUTPATIENT
Start: 2022-12-05 | End: 2022-12-10 | Stop reason: HOSPADM

## 2022-12-05 RX ORDER — MEPERIDINE HYDROCHLORIDE 25 MG/ML
12.5 INJECTION INTRAMUSCULAR; INTRAVENOUS; SUBCUTANEOUS EVERY 5 MIN PRN
Status: COMPLETED | OUTPATIENT
Start: 2022-12-05 | End: 2022-12-05

## 2022-12-05 RX ORDER — SODIUM CHLORIDE 9 MG/ML
INJECTION, SOLUTION INTRAVENOUS PRN
Status: DISCONTINUED | OUTPATIENT
Start: 2022-12-05 | End: 2022-12-10 | Stop reason: HOSPADM

## 2022-12-05 RX ADMIN — DEXAMETHASONE SODIUM PHOSPHATE 10 MG: 10 INJECTION INTRAMUSCULAR; INTRAVENOUS at 12:57

## 2022-12-05 RX ADMIN — HYDROMORPHONE HYDROCHLORIDE 0.5 MG: 1 INJECTION, SOLUTION INTRAMUSCULAR; INTRAVENOUS; SUBCUTANEOUS at 15:00

## 2022-12-05 RX ADMIN — SODIUM CHLORIDE: 9 INJECTION, SOLUTION INTRAVENOUS at 11:22

## 2022-12-05 RX ADMIN — PROPOFOL 200 MG: 10 INJECTION, EMULSION INTRAVENOUS at 12:51

## 2022-12-05 RX ADMIN — CEFAZOLIN 2000 MG: 2 INJECTION, POWDER, FOR SOLUTION INTRAMUSCULAR; INTRAVENOUS at 12:49

## 2022-12-05 RX ADMIN — METHOCARBAMOL 1000 MG: 100 INJECTION, SOLUTION INTRAMUSCULAR; INTRAVENOUS at 15:11

## 2022-12-05 RX ADMIN — GLYCOPYRROLATE 0.6 MG: 0.2 INJECTION, SOLUTION INTRAMUSCULAR; INTRAVENOUS at 14:08

## 2022-12-05 RX ADMIN — MEPERIDINE HYDROCHLORIDE 12.5 MG: 25 INJECTION, SOLUTION INTRAMUSCULAR; INTRAVENOUS; SUBCUTANEOUS at 15:05

## 2022-12-05 RX ADMIN — FENTANYL CITRATE 50 MCG: 50 INJECTION, SOLUTION INTRAMUSCULAR; INTRAVENOUS at 14:27

## 2022-12-05 RX ADMIN — MEPERIDINE HYDROCHLORIDE 12.5 MG: 25 INJECTION, SOLUTION INTRAMUSCULAR; INTRAVENOUS; SUBCUTANEOUS at 14:55

## 2022-12-05 RX ADMIN — HYDROMORPHONE HYDROCHLORIDE 0.5 MG: 1 INJECTION, SOLUTION INTRAMUSCULAR; INTRAVENOUS; SUBCUTANEOUS at 15:25

## 2022-12-05 RX ADMIN — ONDANSETRON 4 MG: 2 INJECTION INTRAMUSCULAR; INTRAVENOUS at 14:08

## 2022-12-05 RX ADMIN — PROCHLORPERAZINE EDISYLATE 5 MG: 5 INJECTION INTRAMUSCULAR; INTRAVENOUS at 15:38

## 2022-12-05 RX ADMIN — MIDAZOLAM 2 MG: 1 INJECTION INTRAMUSCULAR; INTRAVENOUS at 12:47

## 2022-12-05 RX ADMIN — ROCURONIUM BROMIDE 35 MG: 10 INJECTION, SOLUTION INTRAVENOUS at 12:51

## 2022-12-05 RX ADMIN — LIDOCAINE HYDROCHLORIDE 80 MG: 20 INJECTION, SOLUTION INTRAVENOUS at 12:51

## 2022-12-05 RX ADMIN — HYDROMORPHONE HYDROCHLORIDE 0.5 MG: 1 INJECTION, SOLUTION INTRAMUSCULAR; INTRAVENOUS; SUBCUTANEOUS at 21:45

## 2022-12-05 RX ADMIN — FENTANYL CITRATE 50 MCG: 50 INJECTION, SOLUTION INTRAMUSCULAR; INTRAVENOUS at 14:13

## 2022-12-05 RX ADMIN — Medication 3 MG: at 14:08

## 2022-12-05 RX ADMIN — FENTANYL CITRATE 100 MCG: 50 INJECTION, SOLUTION INTRAMUSCULAR; INTRAVENOUS at 12:51

## 2022-12-05 RX ADMIN — ONDANSETRON 4 MG: 2 INJECTION INTRAMUSCULAR; INTRAVENOUS at 23:19

## 2022-12-05 RX ADMIN — SODIUM CHLORIDE, PRESERVATIVE FREE 10 ML: 5 INJECTION INTRAVENOUS at 21:45

## 2022-12-05 ASSESSMENT — PAIN SCALES - GENERAL
PAINLEVEL_OUTOF10: 0
PAINLEVEL_OUTOF10: 10
PAINLEVEL_OUTOF10: 8
PAINLEVEL_OUTOF10: 7

## 2022-12-05 ASSESSMENT — PAIN DESCRIPTION - LOCATION: LOCATION: FOOT

## 2022-12-05 ASSESSMENT — PAIN DESCRIPTION - ORIENTATION
ORIENTATION: LEFT;RIGHT
ORIENTATION: LEFT;RIGHT

## 2022-12-05 ASSESSMENT — PAIN DESCRIPTION - FREQUENCY: FREQUENCY: CONTINUOUS

## 2022-12-05 ASSESSMENT — PAIN DESCRIPTION - DESCRIPTORS: DESCRIPTORS: ACHING;THROBBING

## 2022-12-05 ASSESSMENT — PAIN - FUNCTIONAL ASSESSMENT: PAIN_FUNCTIONAL_ASSESSMENT: PREVENTS OR INTERFERES SOME ACTIVE ACTIVITIES AND ADLS

## 2022-12-05 ASSESSMENT — PAIN DESCRIPTION - PAIN TYPE: TYPE: SURGICAL PAIN

## 2022-12-05 ASSESSMENT — LIFESTYLE VARIABLES: SMOKING_STATUS: 1

## 2022-12-05 NOTE — LETTER
[address] Medicaid  CERTIFICATION OF NECESSITY  FOR TRANSPORTATION   BY WHEELCHAIR VAN     Individual Information   1. Name: Mary Menjivar 2. [place] Medicaid Billing Number: ***   3. Address: [address] [place] [address]      Transportation Provider Information   4. Provider Name: [facility]   5. [place] Medicaid Provider Number: *** National Provider Identifier (NPI): ***     Certification  7. Criteria:  By signing this document, the practitioner certifies that two statements are true:  A. This individual must be accompanied by a mobility-related assistive device from the point of pick-up to the point of drop-off. B. Transport of this individual by standard passenger vehicle or common carrier is precluded or contraindicated. 8. Period Beginning Date: 12/10/22   9. Length  [x] Not more than 1 day(s)  [] One Year     Additional Information Relevant to Certification   10. Comments or Explanations, If Necessary or Appropriate     Nontraumatic tear of tibialis posterior tendon, right, post operative Achilles tendon repair, high falls risk, safety in route, high falls risk     Certifying Practitioner Information   11. Name of Practitioner: Jaz Fiore MD   12. [place] Medicaid Provider Number, If Applicable: *** 13. National Provider Identifier (NPI): ***     Signature Information   14. Date of Signature: 12/10/2022 15. Name of Person Signing: Andreas Suarez RN   16.  Signature and Professional Designation: Electronically signed by Andreas Suarez RN on 12/10/2022 at 10:58 AM     [facility] [id number]  Rev. 7/2015

## 2022-12-05 NOTE — DISCHARGE INSTRUCTIONS
Your information:  Name: Carri Nazario  : 1975    Your instructions:    Discharge to [address] [place] [address] Road CDI with dressings right lower extremity. Dressing CDI left lower extremity. All dressings can remain intact until first postoperative visit. - Dressings: Xeroform DSD Left foot and ankle. Camachoie Nevarez DSD posterior splint RLE.   -Nonweightbearing right lower extremity at all times. Weightbearing as tolerated left lower extremity surgical shoe. Do not touch dressing / splint  Remain non weight bearing to right lower extremity  Call if any concerns with Alden Piper, DPM DPM    What to do after you leave the hospital:    Recommended diet: regular diet    The following personal items were collected during your admission and were returned to you:    Belongings  Dental Appliances: None  Vision - Corrective Lenses: Eyeglasses  Hearing Aid: None  Clothing: Footwear, Jacket/Coat, Pants, Shirt, Undergarments  Jewelry: Ring  Body Piercings Removed: No  Electronic Devices: Cell Phone, , Ear Phones/Buds  Weapons (Notify Protective Services/Security): None  Other Valuables: Sent home  Home Medications: None  Valuables Given To: Family (Comment)  Provide Name(s) of Who Valuable(s) Were Given To: daughter  Responsible person(s) in the waiting room: daughter  Patient approves for provider to speak to responsible person post operatively: Yes    Information obtained by:  By signing below, I understand that if any problems occur once I leave the hospital I am to contact PCP. I understand and acknowledge receipt of the instructions indicated above. Learning About What to Expect at Home After Surgery  What do you need to know when you leave the hospital after surgery? Each person recovers from surgery at a different pace. Your discharge plan will help you leave the hospital safely. It will outline the care you need. And it will give you information about the things you'll need to do at home.   Make sure you get your plan in writing. Look for information on:  What your medicines are and how to take them. When you need to see the doctor again or get any follow-up tests. How and when to change bandages. What to do about any pain or infection. How active you can be. This may include physical therapy. How to prevent falls. Things you can eat or drink and things to avoid. What do you need to know about taking medicines? Your doctor will talk with you about restarting your medicines. The doctor will also tell you about taking any new medicines. If you take aspirin or some other blood thinner, be sure to talk to your doctor. You will be told if and when to start taking those medicines again. If your doctor gave you a prescription medicine for pain, take it as prescribed. If you aren't taking a prescription pain medicine, ask your doctor if you can take an over-the-counter medicine. How can you take care of your incision? If you have a cut (incision), follow your doctor's instructions. If you didn't get instructions, follow this general advice:  You'll have a bandage over the cut. This helps the incision heal and protects it. Change the bandage daily or more often if needed. If you have strips of tape on the cut, leave them on until they fall off. If you have stitches or staples, your doctor will tell you when to have them removed. If you have skin glue (liquid stitches), leave it on until it falls off. Gently wash the area daily with warm water, and pat it dry. Don't use hydrogen peroxide or alcohol. You may shower 24 to 48 hours after surgery. Before showering, cover the bandage with a plastic bag or use another method of keeping it dry. Pat the incision dry if it gets damp. Don't swim or take a bath until your doctor tells you it's okay. When can you be active again? One of the most important things you can do for yourself after surgery is to get up and move around several times a day.  But be careful not to do too much. Here are some tips:  Don't move quickly or lift anything heavy until your doctor says it is okay. Taking short walks is a good way to help your body heal.  Rest when you feel tired. Your doctor may give you instructions on when you can do your normal activities again, such as driving, having sex, and going back to work. What do you need to know about eating? If your doctor told you when you can start eating and what foods you can eat, follow your doctor's instructions. If you did not get instructions, follow this general advice:  You can eat your normal diet when you feel well. Start with small amounts of food. If your bowel movements aren't regular right after surgery, try to avoid constipation and straining. Drink plenty of water. Your doctor may suggest fiber, a stool softener, or a mild laxative. What do you do if you have infection or pain? If you have signs of infection, call your doctor. These signs include: Increased pain, swelling, warmth, or redness. Red streaks leading from the incision. Pus draining from the incision. A fever. Also call your doctor if you have pain that does not get better after you take pain medicine. Follow-up care is a key part of your treatment and safety. Be sure to make and go to all appointments, and call your doctor if you are having problems. It's also a good idea to know your test results and keep a list of the medicines you take. Where can you learn more? Go to [email or web address]://Lexityroberto.[employer]. org and sign in to your [employer] account. Enter N897 in the [facility] box to learn more about \"Learning About What to Expect at Home After Surgery. \"     If you do not have an account, please click on the \"Sign Up Now\" link. Current as of: June 6, 2022               Content Version: 13.4  © 4126-5173 [employer]. Care instructions adapted under license by [address]  If you have questions about a medical condition or this

## 2022-12-05 NOTE — PROGRESS NOTES
CLINICAL PHARMACY NOTE: MEDS TO BEDS    Total # of Prescriptions Filled: 2   The following medications were delivered to the patient:  Norco 5-325 mg  Doxycycline Hyc 100 mg    Additional Documentation:  Gave to patient's family in PACU waiting room.

## 2022-12-05 NOTE — H&P
H and P reviewed. No changes. Plan for Right FHL transfer and calcaneal ostectomy right foot.     Henretta Essex, DPM FACFAS  Fellowship-Trained Foot and Ankle Surgeon  Diplomate, American Board of Foot and Ankle Surgeons  [phone]

## 2022-12-05 NOTE — BRIEF OP NOTE
Brief Postoperative Note      Patient: Job Jennings  YOB: 1975  MRN: [id number]    Date of Procedure: 12/5/2022    Pre-Op Diagnosis: Tendonitis, Achilles, right [M76.61]  Nontraumatic tear of tibialis posterior tendon, right [M66.871]  Right foot pain [M79.671]    Post-Op Diagnosis: Same       Procedure(s):  FHL TENDON TRANSFER AND CALCANEAL OSTECTOMY OF RIGHT FOOT (CPT 77052)    Surgeon(s):  Alden Benjamin DPM    Assistant:  Resident: Marion Goodwin DPM    Anesthesia: Monitor Anesthesia Care    Estimated Blood Loss (mL): Minimal    Complications: Other: incision was performed on the left side as well. This was not on the original consent. The surgical site was marked for right side and the consent read \"right\" side as well. Patient and family notified. Specimens:   ID Type Source Tests Collected by Time Destination   A : Tendon right heel Tissue Tissue SURGICAL PATHOLOGY Giacomo Banerjee DPM 12/5/2022 1347        Implants:  Implant Name Type Inv. Item Serial No.  Lot No. LRB No. Used Action   SCREW INTRF 6.25X15 MM Alva Poke  SCREW INTRF 6.25X15 MM Macel Bear [facility]- [id number] Right 1 Implanted         Drains: * No LDAs found *    Findings: Severe tendinosis and tearing on the right side.     Electronically signed by Giacomo Banerjee DPM on 12/5/2022 at 2:27 PM

## 2022-12-05 NOTE — ANESTHESIA PRE PROCEDURE
Department of Anesthesiology  Preprocedure Note       Name:  Dain Barajas   Age:  52 y.o.  :  1975                                          MRN:  [id number]         Date:  2022      Surgeon: Alvin Mckeon):  Alden López DPM    Procedure: FHL TENDON TRANSFER AND CALCANEAL OSTECTOMY OF RIGHT FOOT (CPT 99440) POWER, C-ARM (Right)    Medications prior to admission:   Prior to Admission medications    Medication Sig Start Date End Date Taking? Authorizing Provider   ibuprofen (IBU) 600 MG tablet Take 1 tablet by mouth every 8 hours as needed for Pain 21  Ricardo Hughes MD       Current medications:    Current Facility-Administered Medications   Medication Dose Route Frequency Provider Last Rate Last Admin    0.9 % sodium chloride infusion   IntraVENous Continuous Bartlett Bosworth, MD        ceFAZolin (ANCEF) 2,000 mg in sterile water 20 mL IV syringe  2,000 mg IntraVENous Once Alden Piper DPM           Allergies:     Allergies   Allergen Reactions    Aspirin Shortness Of Breath, Itching and Swelling     Eye itching and tongue swelling        Problem List:    Patient Active Problem List   Diagnosis Code    Abnormal Pap smear     AGCUS (atypical glandular cells of undetermined significance) on Pap smear [id number]    Pelvic pain in female R10.2    Abscess of submandibular region K12.2       Past Medical History:        Diagnosis Date    Abnormal Pap smear 2012    Atypical glandular cells(AGC)    Asthma     no attack since high school    Diabetes mellitus ([facility] [address]     type 2    Dizziness     Headache     Hypertension , ,    during pregnancy not on meds    Memory difficulties     Vertigo        Past Surgical History:        Procedure Laterality Date     SECTION      x 3    DILATION AND CURETTAGE  11    ENDOMETRIAL ABLATION  11    ENDOMETRIAL BIOPSY      HYSTEROSCOPY  11    LEEP  2012   [address]    left salpingectomy for ectopic pregnancy    ID TOTAL ABDOM HYSTERECTOMY N/A 11/28/2018    TOTAL ABDOMINAL HYSTERECTOMY  AND REMOVAL OF RIGHT FALLOPIAN TUBE performed by Felipa Rodas MD at [address] [place] [address]       Social History:    Social History     Tobacco Use    Smoking status: Never    Smokeless tobacco: Never    Tobacco comments:     only smokes marijuana   Substance Use Topics    Alcohol use: Yes     Alcohol/week: 1.0 standard drink     Types: 1 Glasses of wine per week     Comment: social                                Counseling given: Not Answered  Tobacco comments: only smokes marijuana      Vital Signs (Current): There were no vitals filed for this visit.                                            BP Readings from Last 3 Encounters:   09/30/21 127/85   06/28/21 115/78   05/23/21 (!) 134/90       NPO Status: Time of last liquid consumption: 2300                        Time of last solid consumption: 2000                        Date of last liquid consumption: 12/04/22                        Date of last solid food consumption: 12/04/22    BMI:   Wt Readings from Last 3 Encounters:   09/30/21 193 lb (87.5 kg)   06/28/21 190 lb (86.2 kg)   05/23/21 198 lb (89.8 kg)     There is no height or weight on file to calculate BMI.    CBC:   Lab Results   Component Value Date/Time    WBC 18.9 04/05/2021 06:00 AM    RBC 4.80 04/05/2021 06:00 AM    HGB 12.8 04/05/2021 06:00 AM    HCT 38.6 04/05/2021 06:00 AM    MCV 80.4 04/05/2021 06:00 AM    RDW 13.0 04/05/2021 06:00 AM     04/05/2021 06:00 AM       CMP:   Lab Results   Component Value Date/Time     04/05/2021 06:00 AM    K 3.8 04/05/2021 06:00 AM    K 3.6 04/03/2021 05:30 PM     04/05/2021 06:00 AM    CO2 23 04/05/2021 06:00 AM    BUN 8 04/05/2021 06:00 AM    CREATININE 0.7 04/05/2021 06:00 AM    GFRAA >60 04/05/2021 06:00 AM    LABGLOM >60 04/05/2021 06:00 AM    GLUCOSE 184 04/05/2021 06:00 AM    PROT 7.5 06/29/2019 04:30 PM    CALCIUM 8.8

## 2022-12-06 LAB
ALBUMIN SERPL-MCNC: 4 G/DL (ref 3.5–5.2)
ALP BLD-CCNC: 67 U/L (ref 35–104)
ALT SERPL-CCNC: 16 U/L (ref 0–32)
ANION GAP SERPL CALCULATED.3IONS-SCNC: 12 MMOL/L (ref 7–16)
AST SERPL-CCNC: 16 U/L (ref 0–31)
BASOPHILS ABSOLUTE: 0.02 E9/L (ref 0–0.2)
BASOPHILS RELATIVE PERCENT: 0.1 % (ref 0–2)
BILIRUB SERPL-MCNC: 0.4 MG/DL (ref 0–1.2)
BUN BLDV-MCNC: 7 MG/DL (ref 6–20)
CALCIUM SERPL-MCNC: 8.5 MG/DL (ref 8.6–10.2)
CHLORIDE BLD-SCNC: 102 MMOL/L (ref 98–107)
CO2: 22 MMOL/L (ref 22–29)
CREAT SERPL-MCNC: 0.5 MG/DL (ref 0.5–1)
EOSINOPHILS ABSOLUTE: 0 E9/L (ref 0.05–0.5)
EOSINOPHILS RELATIVE PERCENT: 0 % (ref 0–6)
GFR SERPL CREATININE-BSD FRML MDRD: >60 ML/MIN/1.73
GLUCOSE BLD-MCNC: 148 MG/DL (ref 74–99)
HBA1C MFR BLD: 6.6 % (ref 4–5.6)
HCT VFR BLD CALC: 38 % (ref 34–48)
HEMOGLOBIN: 12.6 G/DL (ref 11.5–15.5)
IMMATURE GRANULOCYTES #: 0.07 E9/L
IMMATURE GRANULOCYTES %: 0.4 % (ref 0–5)
LYMPHOCYTES ABSOLUTE: 1.92 E9/L (ref 1.5–4)
LYMPHOCYTES RELATIVE PERCENT: 11.6 % (ref 20–42)
MCH RBC QN AUTO: 26.6 PG (ref 26–35)
MCHC RBC AUTO-ENTMCNC: 33.2 % (ref 32–34.5)
MCV RBC AUTO: 80.3 FL (ref 80–99.9)
MONOCYTES ABSOLUTE: 0.66 E9/L (ref 0.1–0.95)
MONOCYTES RELATIVE PERCENT: 4 % (ref 2–12)
NEUTROPHILS ABSOLUTE: 13.94 E9/L (ref 1.8–7.3)
NEUTROPHILS RELATIVE PERCENT: 83.9 % (ref 43–80)
PDW BLD-RTO: 12.1 FL (ref 11.5–15)
PLATELET # BLD: 288 E9/L (ref 130–450)
PMV BLD AUTO: 9.6 FL (ref 7–12)
POTASSIUM REFLEX MAGNESIUM: 3.8 MMOL/L (ref 3.5–5)
RBC # BLD: 4.73 E12/L (ref 3.5–5.5)
SODIUM BLD-SCNC: 136 MMOL/L (ref 132–146)
TOTAL PROTEIN: 6.9 G/DL (ref 6.4–8.3)
WBC # BLD: 16.6 E9/L (ref 4.5–11.5)

## 2022-12-06 PROCEDURE — 96374 THER/PROPH/DIAG INJ IV PUSH: CPT

## 2022-12-06 PROCEDURE — G0378 HOSPITAL OBSERVATION PER HR: HCPCS

## 2022-12-06 PROCEDURE — 83036 HEMOGLOBIN GLYCOSYLATED A1C: CPT

## 2022-12-06 PROCEDURE — 36415 COLL VENOUS BLD VENIPUNCTURE: CPT

## 2022-12-06 PROCEDURE — 85025 COMPLETE CBC W/AUTO DIFF WBC: CPT

## 2022-12-06 PROCEDURE — 6360000002 HC RX W HCPCS: Performed by: INTERNAL MEDICINE

## 2022-12-06 PROCEDURE — 97535 SELF CARE MNGMENT TRAINING: CPT

## 2022-12-06 PROCEDURE — 6370000000 HC RX 637 (ALT 250 FOR IP): Performed by: STUDENT IN AN ORGANIZED HEALTH CARE EDUCATION/TRAINING PROGRAM

## 2022-12-06 PROCEDURE — 96376 TX/PRO/DX INJ SAME DRUG ADON: CPT

## 2022-12-06 PROCEDURE — 96375 TX/PRO/DX INJ NEW DRUG ADDON: CPT

## 2022-12-06 PROCEDURE — 97165 OT EVAL LOW COMPLEX 30 MIN: CPT

## 2022-12-06 PROCEDURE — 6370000000 HC RX 637 (ALT 250 FOR IP): Performed by: INTERNAL MEDICINE

## 2022-12-06 PROCEDURE — 99225 PR SBSQ OBSERVATION CARE/DAY 25 MINUTES: CPT | Performed by: STUDENT IN AN ORGANIZED HEALTH CARE EDUCATION/TRAINING PROGRAM

## 2022-12-06 PROCEDURE — 97530 THERAPEUTIC ACTIVITIES: CPT | Performed by: PHYSICAL THERAPIST

## 2022-12-06 PROCEDURE — 1200000000 HC SEMI PRIVATE

## 2022-12-06 PROCEDURE — 80053 COMPREHEN METABOLIC PANEL: CPT

## 2022-12-06 PROCEDURE — 97161 PT EVAL LOW COMPLEX 20 MIN: CPT | Performed by: PHYSICAL THERAPIST

## 2022-12-06 PROCEDURE — 2580000003 HC RX 258: Performed by: INTERNAL MEDICINE

## 2022-12-06 RX ORDER — DIPHENHYDRAMINE HCL 25 MG
50 TABLET ORAL EVERY 8 HOURS PRN
Status: DISCONTINUED | OUTPATIENT
Start: 2022-12-06 | End: 2022-12-10 | Stop reason: HOSPADM

## 2022-12-06 RX ORDER — METHOCARBAMOL 500 MG/1
500 TABLET, FILM COATED ORAL EVERY 8 HOURS PRN
Status: DISCONTINUED | OUTPATIENT
Start: 2022-12-06 | End: 2022-12-07

## 2022-12-06 RX ORDER — FAMOTIDINE 20 MG/1
20 TABLET, FILM COATED ORAL 2 TIMES DAILY
Status: DISCONTINUED | OUTPATIENT
Start: 2022-12-06 | End: 2022-12-10 | Stop reason: HOSPADM

## 2022-12-06 RX ORDER — OXYCODONE HYDROCHLORIDE AND ACETAMINOPHEN 5; 325 MG/1; MG/1
1 TABLET ORAL EVERY 4 HOURS PRN
Status: DISCONTINUED | OUTPATIENT
Start: 2022-12-06 | End: 2022-12-10 | Stop reason: HOSPADM

## 2022-12-06 RX ADMIN — SODIUM CHLORIDE, PRESERVATIVE FREE 10 ML: 5 INJECTION INTRAVENOUS at 07:34

## 2022-12-06 RX ADMIN — METHOCARBAMOL 500 MG: 500 TABLET ORAL at 20:35

## 2022-12-06 RX ADMIN — OXYCODONE AND ACETAMINOPHEN 1 TABLET: 5; 325 TABLET ORAL at 23:17

## 2022-12-06 RX ADMIN — HYDROMORPHONE HYDROCHLORIDE 0.5 MG: 1 INJECTION, SOLUTION INTRAMUSCULAR; INTRAVENOUS; SUBCUTANEOUS at 21:41

## 2022-12-06 RX ADMIN — ACETAMINOPHEN 650 MG: 325 TABLET ORAL at 14:35

## 2022-12-06 RX ADMIN — ONDANSETRON 4 MG: 2 INJECTION INTRAMUSCULAR; INTRAVENOUS at 06:12

## 2022-12-06 RX ADMIN — PROMETHAZINE HYDROCHLORIDE 12.5 MG: 25 TABLET ORAL at 07:40

## 2022-12-06 RX ADMIN — HYDROMORPHONE HYDROCHLORIDE 0.5 MG: 1 INJECTION, SOLUTION INTRAMUSCULAR; INTRAVENOUS; SUBCUTANEOUS at 10:00

## 2022-12-06 RX ADMIN — FAMOTIDINE 20 MG: 20 TABLET, FILM COATED ORAL at 14:34

## 2022-12-06 RX ADMIN — ONDANSETRON 4 MG: 2 INJECTION INTRAMUSCULAR; INTRAVENOUS at 19:15

## 2022-12-06 RX ADMIN — FAMOTIDINE 20 MG: 20 TABLET, FILM COATED ORAL at 20:35

## 2022-12-06 RX ADMIN — HYDROMORPHONE HYDROCHLORIDE 0.5 MG: 1 INJECTION, SOLUTION INTRAMUSCULAR; INTRAVENOUS; SUBCUTANEOUS at 16:21

## 2022-12-06 RX ADMIN — OXYCODONE AND ACETAMINOPHEN 1 TABLET: 5; 325 TABLET ORAL at 19:17

## 2022-12-06 RX ADMIN — OXYCODONE AND ACETAMINOPHEN 1 TABLET: 5; 325 TABLET ORAL at 06:12

## 2022-12-06 RX ADMIN — HYDROMORPHONE HYDROCHLORIDE 0.5 MG: 1 INJECTION, SOLUTION INTRAMUSCULAR; INTRAVENOUS; SUBCUTANEOUS at 13:21

## 2022-12-06 RX ADMIN — HYDROMORPHONE HYDROCHLORIDE 0.5 MG: 1 INJECTION, SOLUTION INTRAMUSCULAR; INTRAVENOUS; SUBCUTANEOUS at 02:00

## 2022-12-06 ASSESSMENT — PAIN DESCRIPTION - DESCRIPTORS
DESCRIPTORS: ACHING;THROBBING
DESCRIPTORS: ACHING;THROBBING;BURNING
DESCRIPTORS: THROBBING
DESCRIPTORS: SPASM
DESCRIPTORS: SHARP;SPASM
DESCRIPTORS: BURNING

## 2022-12-06 ASSESSMENT — PAIN DESCRIPTION - LOCATION
LOCATION: LEG
LOCATION: FOOT
LOCATION: LEG
LOCATION: FOOT
LOCATION: FOOT
LOCATION: LEG

## 2022-12-06 ASSESSMENT — PAIN SCALES - GENERAL
PAINLEVEL_OUTOF10: 3
PAINLEVEL_OUTOF10: 9
PAINLEVEL_OUTOF10: 7
PAINLEVEL_OUTOF10: 0
PAINLEVEL_OUTOF10: 7
PAINLEVEL_OUTOF10: 7
PAINLEVEL_OUTOF10: 9
PAINLEVEL_OUTOF10: 8
PAINLEVEL_OUTOF10: 7
PAINLEVEL_OUTOF10: 8
PAINLEVEL_OUTOF10: 6

## 2022-12-06 ASSESSMENT — PAIN DESCRIPTION - ORIENTATION
ORIENTATION: RIGHT
ORIENTATION: RIGHT
ORIENTATION: RIGHT;LEFT
ORIENTATION: RIGHT;LEFT
ORIENTATION: RIGHT
ORIENTATION: RIGHT

## 2022-12-06 ASSESSMENT — PAIN DESCRIPTION - PAIN TYPE: TYPE: SURGICAL PAIN

## 2022-12-06 ASSESSMENT — PAIN DESCRIPTION - FREQUENCY: FREQUENCY: CONTINUOUS

## 2022-12-06 NOTE — PROGRESS NOTES
Attempted to get patient up to restroom. Sat edge of bad and began falling backward saying she does not feel right. Got a set a vitals they were WNL. Laid patient back down and re assessed in 20 minutes. Got patient up again to use the restroom patient tolerated well. Once patient got back to her bed she began sayig she did not feel right and began shaking. She stated she felt as if she was having a panic attack. VS taken and are WNL. Notified DR and plan to admit for observation.

## 2022-12-06 NOTE — PROGRESS NOTES
Physical Therapy Initial Evaluation/Plan of Care    Room #:  0313/0313-01  Patient Name: Dain Barajas  YOB: 1975  MRN: [id number]    Date of Service: 12/6/2022     Tentative placement recommendation: Subacute vs Home Health Physical Therapy if patient meets goals  Equipment recommendation: To be determined      Evaluating Physical Therapist: Xavier Cardenas, PT #[id number]      Specific Provider Orders/Date/Referring Provider :  12/06/22 0745    PT eval and treat  Start:  12/06/22 0745,   End:  12/06/22 0745,   ONE TIME,   Standing Count:  1 Occurrences,   Prince Montemayor MD     Admitting Diagnosis:   Tendonitis, Achilles, right [M76.61]  Nontraumatic tear of tibialis posterior tendon, right [M66.871]  Right foot pain [M79.671]  Postoperative pain [G89.18]  Intractable pain [R52]       Surgery:   Date of Procedure: 12/5/2022  Procedure(s):  FHL TENDON TRANSFER AND CALCANEAL OSTECTOMY OF RIGHT FOOT (CPT 67233)  Surgeon(s):    Alden López DPM  Visit Diagnoses         Codes    Tendonitis, Achilles, right     M76.61    Nontraumatic tear of tibialis posterior tendon, right     M66.871    Right foot pain     M79.671            Patient Active Problem List   Diagnosis    Abnormal Pap smear    AGCUS (atypical glandular cells of undetermined significance) on Pap smear    Pelvic pain in female    Abscess of submandibular region    Postoperative pain    Intractable pain        ASSESSMENT of Current Deficits Patient exhibits decreased strength, balance, endurance, range of motion, and pain    impairing functional mobility, transfers, gait , gait distance, and tolerance to activity patient demonstrates safe technique   and able to maintain non weight bearing Right lower extremity using walker or crutches. Nausea and lightheadedness limiting increased activity/stairs. Patient requires skilled physical therapy to address concerns listed above to increase safety and independence at discharge. PHYSICAL THERAPY  PLAN OF CARE       Physical therapy plan of care is established based on physician order,  patient diagnosis and clinical assessment    Current Treatment Recommendations:    -Standing Balance: Perform sit to stand activities maintaining NWB (non-weight bearing) weightbearing right lower extremity   -Transfers: Cues for hand placement, technique and safety. Provide stabilization to prevent fall  and Provide instruction on proper hand and left lower extremity placement to maintain NWB (non-weight bearing) weightbearing right lower extremity   -Gait: Gait training and Use of Assistive device for NWB (non-weight bearing) weight bearing right lower extremity     -Endurance: Utilize Supervised activities to increase level of endurance to allow for safe functional mobility including transfers and gait   -Stairs: Stair training with instruction on proper technique and hand placement on rail to maintain NWB (non-weight bearing) weightbearing right lower extremity     PT long term treatment goals are located in below grid    Patient and or family understand(s) diagnosis, prognosis, and plan of care. Frequency of treatments: Patient will be seen  twice daily.          Prior Level of Function: Patient ambulated independently    Rehab Potential: fair + for baseline    Past medical history:   Past Medical History:   Diagnosis Date    Abnormal Pap smear 2012    Atypical glandular cells(AGC)    Asthma     no attack since high school    Diabetes mellitus ([facility] [address]     type 2    Dizziness     Headache     Hypertension , ,2005    during pregnancy not on meds    Memory difficulties     Vertigo      Past Surgical History:   Procedure Laterality Date     SECTION      x 3    DILATION AND CURETTAGE  11    ENDOMETRIAL ABLATION  11    ENDOMETRIAL BIOPSY      FOOT TENDON SURGERY Right 2022    FHL TENDON TRANSFER AND CALCANEAL OSTECTOMY OF RIGHT FOOT (CPT 01551) performed by Zeeshan Jacinto DPM at [facility]  1/5/11    LEEP  feb 2012    [address]    left salpingectomy for ectopic pregnancy    SD TOTAL ABDOM HYSTERECTOMY N/A 11/28/2018    TOTAL ABDOMINAL HYSTERECTOMY  AND REMOVAL OF RIGHT FALLOPIAN TUBE performed by Vinita Way MD at [address] [address]       SUBJECTIVE:    Precautions: Bedrest with bathroom Privileges , falls , non weight bearing  Right lower extremity     Imaging results: Fluoro For Surgical Procedures    Result Date: 12/5/2022  EXAMINATION: SPOT FLUOROSCOPIC IMAGES 12/5/2022 2:38 pm     Intraprocedural fluoroscopic spot images as above. See separate procedure report for more information. Social history: Patient lives with 3 children and grandson  in a ranch home  with 3 steps  to enter with Rail  Tub shower      Equipment owned: [employer],       [employer] [facility] [employer] [facility]   How much difficulty turning over in bed?: None  How much difficulty sitting down on / standing up from a chair with arms?: A Little  How much difficulty moving from lying on back to sitting on side of bed?: None  How much help from another person moving to and from a bed to a chair?: A Little  How much help from another person needed to walk in hospital room?: A Little  How much help from another person for climbing 3-5 steps with a railing?: A Little  AM-PAC Inpatient Mobility Raw Score : 20  AM-PAC Inpatient T-Scale Score : 47.67  Mobility Inpatient CMS 0-100% Score: 35.83  Mobility Inpatient CMS G-Code Modifier : [address] cleared patient for PT evaluation. The admitting diagnosis and active problem list as listed above have been reviewed prior to the initiation of this evaluation. OBJECTIVE;   Initial Evaluation  Date: 12/6/2022 Treatment Date:     Short Term/ Long Term   Goals   Was pt agreeable to Eval/treatment?  Yes  To be met in 3 days   Pain level   6/10  Right lower extremity      Bed Mobility    Rolling: Not assessed     Supine to sit: Independent    Sit to supine: Independent    Scooting: Independent    Rolling: Independent    Supine to sit:  Independent    Sit to supine: Independent    Scooting: Independent     Transfers Sit to stand: Minimal assist of 1 first rep, supervision thereafter  Cues for hand placement and safety   Sit to stand: Modified Independent     Ambulation     1 x 20 feet wheeled walker, 2 x 15 feet using  crutches with Minimal progressing to supervision   for balance and safety and cues for sequencing, NWB (non-weight bearing) weight bearing right lower extremity, and safety    30 feet using  crutches with Modified Independent non weight bearing Right lower extremity    Stair negotiation: ascended and descended   Not assessed     3 steps 1 rail and crutch with minimal assist   ROM Within functional limits    Increase range of motion 10% of affected joints    Strength BUE:  refer to OT eval  RLE:   not assessed    LLE:  4+/5  Increase strength in affected mm groups by 1/3 grade   Balance Sitting EOB:  good -  Dynamic Standing:  fair +wheeled walker or crutches  Sitting EOB:  good    Dynamic Standing: good -crutches     Patient is Alert & Oriented x person, place, time, and situation and follows directions    Sensation:  Patient  reports numbness/tingling bilateral lower extremities     Edema:  yes bilateral lower extremities right > left  Endurance: fair  +    Vitals: room air   Blood Pressure at rest  Blood Pressure during session    Heart Rate at rest   Heart Rate during session     SPO2 at rest  %  SPO2 during session  %     Patient education  Patient educated on role of Physical Therapy, risks of immobility, safety and plan of care,  importance of mobility while in hospital , ankle pumps, quad set and glut set for edema control, blood clot prevention, and weight bearing status      Patient response to education:   Pt verbalized understanding Pt demonstrated skill Pt requires further education in this area   Yes Partial Yes      Treatment:  Patient practiced and was instructed/facilitated in the following treatment: Patient performed exercise, assisted to edge of bed,   Sat edge of bed 5 minutes with Supervision  to increase dynamic sitting balance and activity tolerance. Ambulated in room, ambulated to/from bathroom with crutches, assisted on/off commode, stood at sink with supervision for balance to wash hands. Returned to bed. Therapeutic Exercises:  ankle pumps, quad sets, and glut sets  x 10 reps. At end of session, patient in bed with     call light and phone within reach,  all lines and tubes intact, nursing notified. Patient would benefit from continued skilled Physical Therapy to improve functional independence and quality of life. Patient's/ family goals   home    Time in  0815  Time out  0845    Total Treatment Time  10 minutes    Evaluation time includes thorough review of current medical information, gathering information on past medical history/social history and prior level of function, completion of standardized testing/informal observation of tasks, assessment of data, and development of Plan of care and goals.      CPT codes:  Low Complexity PT evaluation (20311)  Therapeutic activities (91095)   10 minutes  1 unit(s)    Stephen Fox, PT

## 2022-12-06 NOTE — CARE COORDINATION
CM note: Provided patient with NABIL list at her request after working with therapy. Pt feels that she cannot return home at this time as she does not feel that she has the proper assistance. Explained to her that she should choose 3 choices from the list that are in network (CM X'd out those that were out of network) and CM would come back in an hour or so for choices. Explained that she would also need insurance approval to go to [facility] and that would be based on medical/physical need.

## 2022-12-06 NOTE — OP NOTE
[address]                                OPERATIVE REPORT    PATIENT NAME: Chelita Pascual                     :        1975  MED REC NO:   [id number]                            ROOM:       3439  ACCOUNT NO:   [de-identified]                           ADMIT DATE: 2022  PROVIDER:     Beatriz Kim DPM    DATE OF PROCEDURE:  2022    SURGEON:  Beatriz Kim DPM    ASSISTANT:  Marion Goodwin. PREOPERATIVE DIAGNOSES:  1. Achilles tendinosis, right ankle. 2.  Disease and degenerative tear, right Achilles tendon. 3.  Right ankle pain. 4.  Exostosis right ankle. POSTOPERATIVE DIAGNOSES:  1. Achilles tendinosis, right ankle. 2.  Disease and degenerative tear right Achilles tendon. 3.  Right ankle pain. 4.  Exostosis right ankle. PROCEDURES:  1. Flexor hallucis longus tendon transfer, right ankle. 2.  Calcaneal ostectomy, right ankle. ANESTHESIA:  General.    HEMOSTASIS:  Pneumatic thigh tourniquet set at 300 mmHg. ESTIMATED BLOOD LOSS:  Minimal.    Total tourniquet time 30 minutes. ESTIMATED BLOOD LOSS:  Minimal.    COMPLICATIONS:  Incision was performed on the left side as well. This  was not in the original consent. Surgical site was marked for left side  and to the right side as well. However, with the extra incision placed  on the left side, we did notify the patient and the family and they both  understood and they verbalized understanding. _Materials used:____ right Achilles tendon intraop measurements, 6.25 mm Bio-Tenodesis  screw and 2.0 FiberWire. INTRAOPERATIVE FINDINGS:  Severe tendinosis and tearing on the right  side of the Achilles tendon. Excellent incorporation on the flexor  hallucis longus tendon into the tunnel and successful resection of the  posterior tubercle of the calcaneus.     INDICATIONS:  This is a pleasant 49-year-old female, presents today for  the right Achilles debridement. The patient was counseled on the nature  of the problems, proposed course of procedure, potential benefits,  risks, complications and convalescences in detail. All questions have  been answered to the patient's apparent satisfaction. No guarantees  were given as to outcome of procedure. Before dressing the right side,  as I noted the complication session, a left side incision was placed on  the posterior ankle. Blunt dissection was directly of this tendon was  performed. The left side of the Achilles tenon appeared to be intact. No pathology noted at this time on initial observation. That incision  was irrigated and closed using 3-0 nylon suture. At this point,  attention was returned to posterior aspect of right ankle where using  #10 blade performed a 15 cm incision in the posterior aspect of the  ankle posterior aspect of the ankle. The incision was taken through the  subcutaneous tissue. Bleeders ligated as appropriate. Next, sharp  dissection was obtained only down to the tendon which was now dissected  and then released from its inferior heel attachment. At this point, it  was 4:25 unable to dissect all the tendons. Next, I was able to then  release the Achilles tendon attachment distally. Dissected and removed  from the surgical site in toto. The tendon distally appeared to be  severely diseased and was sent off for pathological examination. Next  using osteotome and reciprocating saw, we are able to reset the  posterior tubercle of the calcaneus. I could not appreciate any disease  to the bone itself, however, there was severe bony exostosis noted that  is consistent with patient's pathology. At this point using blunt  dissection, I was able to then dissect through the posterior septum,  intramuscular septum, flexor muscles. I was able to the isolate the  flexor hallucis longus tendon avoiding the neuromuscular and  neurovascular structures there.   Doing so, I was able to then release  the tendon distally __just before the knot of Shine___. Next, I was able to harvest the tendon and  to use interlocking sutures with FiberWire. In doing so, I was able  then to perform the tendon transfer. In doing so, once the FiberWire  loops were infused to the tendon, I was able to then drill the pathway  into the calcaneus with standard technique. Guidewire was also  introduced and then next we were able to pass the tendon and the  FiberWire through the tunnel which was drilled at measurement 6.25 for  6.25 Bio-Tenodesis screw. This was measured prior to drilling from the  tunnel. In doing so, I was able to then introduce the tendon through  the tunnel and the FiberWire through the tunnel, applied tension to this  area. Next, I introduced the Bio-Tenodesis screw; 6.25 mm Bio-Tenodesis  screw by 20 and then by 15. Excellent compression was achieved. I did  test the ankle for range of motions and muscular examination could not  appreciate any deficits or weakness at this point. The flexor hallucis  longus appeared to be sitting perfectly to the posterior calcaneal body. Next at this point, the area was irrigated using copious amounts of  saline. Post irrigation, closed incisions using 3-0 nylon suture. Postoperative bandage was applied as well as a modified [place]  compression dressing was applied on the right side. Overall, the  patient tolerated the procedure and anesthesia well, in apparent  satisfactory condition with vital signs stable and vascular status  intact to the right lower extremity. The patient was then transferred  to the PACU for further monitoring prior to readmission to the nursing  floor.         Korey Mclaughlin DPM    D: 12/05/2022 18:13:16       T: 12/06/2022 1:07:59     [email or web address]  Job#: [id number]     Doc#: [id number]    CC:

## 2022-12-06 NOTE — H&P
[address] [place], [place] [address]ist Group   HISTORY AND PHYSICAL EXAM      AUTHOR: Kortney Garcia MD PATIENT NAME: Airam Narvaez   PCP: Kevin Calderon DO  MRN: [id number], : 1975       CHIEF COMPLAINT / REASON FOR ADMISSION:  Intractable right ankle pain  HPI:   This is a 52 y.o. female  has a past medical history of Abnormal Pap smear, Asthma, Diabetes mellitus ([address] Dizziness, Headache, Hypertension, Memory difficulties, and Vertigo. presented with ntractable right ankle pain  for which was had a surgery earlier but her pain persisted postoperatively and now being admitted for pain control. The patient was seen and examined at bedside, appears alert and awake with no acute distress and is able to answer simple  questions. On direct questioning, patient denied any  resting ongoing chest pain, resting SOB, hemoptysis, productive cough, fever, ongoing palpitation, active abdominal pain, hematemesis, rectal bleeding, rock, hematuria, any other  and GI complaints, and any new focal neuro deficits .     ROS:  Pertinent positives and negatives are noted in the HPI, all other systems are reviewed and negative    PMH:  Past Medical History:   Diagnosis Date    Abnormal Pap smear 2012    Atypical glandular cells(AGC)    Asthma     no attack since high school    Diabetes mellitus ([address]     type 2    Dizziness     Headache     Hypertension , ,    during pregnancy not on meds    Memory difficulties     Vertigo        Surgical History:  Past Surgical History:   Procedure Laterality Date     SECTION      x 3    DILATION AND CURETTAGE  11    ENDOMETRIAL ABLATION  11    ENDOMETRIAL BIOPSY      HYSTEROSCOPY  11    LEEP  2012    PELVIC LAPAROSCOPY      left salpingectomy for ectopic pregnancy    NE TOTAL ABDOM HYSTERECTOMY N/A 2018    TOTAL ABDOMINAL HYSTERECTOMY  AND REMOVAL OF RIGHT FALLOPIAN TUBE performed by Lauri Nation MD at [address] [place]  2006       Medications Prior to Admission:    Prior to Admission medications    Medication Sig Start Date End Date Taking? Authorizing Provider   doxycycline hyclate (VIBRA-TABS) 100 MG tablet Take 1 tablet by mouth 2 times daily for 10 days 12/5/22 12/15/22 Yes Alden Piper DPM   HYDROcodone-acetaminophen (NORCO) 5-325 MG per tablet Take 1 tablet by mouth every 4 hours as needed for Pain for up to 7 days. Intended supply: 7 days. Take lowest dose possible to manage pain 12/5/22 12/12/22 Yes Alden Piper DPM   ibuprofen (IBU) 600 MG tablet Take 1 tablet by mouth every 8 hours as needed for Pain 6/28/21 7/8/21  Ricardo Dawson MD       Allergies:    Aspirin    Social History:    reports that she has never smoked. She has never used smokeless tobacco. She reports current alcohol use of about 1.0 standard drink per week. She reports current drug use. Drugs: Marijuana (Weed) and Cocaine. Family History:   family history includes Breast Cancer in her maternal aunt; Cancer in her father; Diabetes in her brother; Hypertension in her father and mother; Lupus in her mother; Sickle Cell Anemia in her daughter; Sickle Cell Trait in her daughter. PHYSICAL EXAM:  Vitals:  BP (!) 121/92   Pulse 89   Temp 97.5 °F (36.4 °C) (Oral)   Resp 18   Ht 5' 5\" (1.651 m)   Wt 190 lb (86.2 kg)   LMP 07/16/2018 Comment: always irregular  SpO2 100%   BMI 31.62 kg/m²   GENERAL: No acute distress, Alert and awake, Afebrile, Appears tired and weak otherwise hemodynamically stable at present. HEENT: PERRLA, no icterus. OP clear and no exudates. NECK: Supple  no carotid/ophthalmic bruits, JVD None. RESPIRATORY:  Bilateral equal vesicular breath sound with no wheezing. Lung bases are clear. HEART: No tachycardia at bedside and regular rhythm. Normal S1 and S2, No S3 or S4 is audible. No pulsation, thrills, murmur or friction rubs. ABDOMEN: Soft, nondistended, nontender. No hepatomegaly or splenomegaly. No CVA tenderness on the both sides. Bowel sound is present. EXTREMITIES: All peripheral pulses are present. No calf tenderness or swelling. No pedal edema is present. Rt Ankle - bandage is dry. Able to move toes. NEUROLOGY: Alert and awake. No new focal neuro deficit. Bilateral Pupil is equal and reactive to light. CN-ii-xii otherwise grossly intact. Motor and Sensory: Grossly Intact bilaterally with no new focal signs     LABS:  Recent Labs     12/05/22  1115 12/06/22  0414   WBC 8.5 16.6*   RBC 4.80 4.73   HGB 12.9 12.6   HCT 38.5 38.0   MCV 80.2 80.3   MCH 26.9 26.6   MCHC 33.5 33.2   RDW 12.3 12.1    288   MPV 9.2 9.6     Recent Labs     12/05/22  1115      K 3.6      CO2 23   BUN 9   CREATININE 0.7   GLUCOSE 100*   CALCIUM 8.9     No results for input(s): POCGLU in the last 72 hours.   Results for orders placed or performed during the hospital encounter of 12/05/22   CBC   Result Value Ref Range    WBC 8.5 4.5 - 11.5 E9/L    RBC 4.80 3.50 - 5.50 E12/L    Hemoglobin 12.9 11.5 - 15.5 g/dL    Hematocrit 38.5 34.0 - 48.0 %    MCV 80.2 80.0 - 99.9 fL    MCH 26.9 26.0 - 35.0 pg    MCHC 33.5 32.0 - 34.5 %    RDW 12.3 11.5 - 15.0 fL    Platelets 188 776 - 171 E9/L    MPV 9.2 7.0 - 12.0 fL   Basic Metabolic Panel w/ Reflex to MG   Result Value Ref Range    Sodium 139 132 - 146 mmol/L    Potassium reflex Magnesium 3.6 3.5 - 5.0 mmol/L    Chloride 105 98 - 107 mmol/L    CO2 23 22 - 29 mmol/L    Anion Gap 11 7 - 16 mmol/L    Glucose 100 (H) 74 - 99 mg/dL    BUN 9 6 - 20 mg/dL    Creatinine 0.7 0.5 - 1.0 mg/dL    Est, Glom Filt Rate >60 >=60 mL/min/1.73    Calcium 8.9 8.6 - 10.2 mg/dL   CBC with Auto Differential   Result Value Ref Range    WBC 16.6 (H) 4.5 - 11.5 E9/L    RBC 4.73 3.50 - 5.50 E12/L    Hemoglobin 12.6 11.5 - 15.5 g/dL    Hematocrit 38.0 34.0 - 48.0 %    MCV 80.3 80.0 - 99.9 fL    MCH 26.6 26.0 - 35.0 pg    MCHC 33.2 32.0 - 34.5 %    RDW 12.1 11.5 - 15.0 fL    Platelets 743 583 - 293 E9/L    MPV 9.6 7.0 - 12.0 fL    Neutrophils % 83.9 (H) 43.0 - 80.0 %    Immature Granulocytes % 0.4 0.0 - 5.0 %    Lymphocytes % 11.6 (L) 20.0 - 42.0 %    Monocytes % 4.0 2.0 - 12.0 %    Eosinophils % 0.0 0.0 - 6.0 %    Basophils % 0.1 0.0 - 2.0 %    Neutrophils Absolute 13.94 (H) 1.80 - 7.30 E9/L    Immature Granulocytes # 0.07 E9/L    Lymphocytes Absolute 1.92 1.50 - 4.00 E9/L    Monocytes Absolute 0.66 0.10 - 0.95 E9/L    Eosinophils Absolute 0.00 (L) 0.05 - 0.50 E9/L    Basophils Absolute 0.02 0.00 - 0.20 E9/L   POCT Glucose   Result Value Ref Range    Meter Glucose 201 (H) 74 - 99 mg/dL   EKG 12 lead   Result Value Ref Range    Ventricular Rate 86 BPM    Atrial Rate 86 BPM    P-R Interval 178 ms    QRS Duration 74 ms    Q-T Interval 386 ms    QTc Calculation (Bazett) 461 ms    P Axis 66 degrees    R Axis 40 degrees    T Axis 29 degrees        Radiology: Fluoro For Surgical Procedures    Result Date: 12/5/2022  EXAMINATION: SPOT FLUOROSCOPIC IMAGES 12/5/2022 2:38 pm TECHNIQUE: Fluoroscopy was provided by the radiology department for procedure. Radiologist was not present during examination. FLUOROSCOPY DOSE AND TYPE OR TIME AND EXPOSURES: Fluoroscopy time equals 1.5 seconds. Total dose equals point 0 9 mGy COMPARISON: None HISTORY: ORDERING SYSTEM PROVIDED HISTORY: Pain TECHNOLOGIST PROVIDED HISTORY: Reason for exam:->pain Intraprocedural imaging. FINDINGS: 1 spot images of the ankle were obtained. Intraprocedural fluoroscopic spot images as above. See separate procedure report for more information.      ASSESSMENT:    Present on Admission:   Postoperative pain   Intractable pain    PLAN:  # Nontraumatic tear of tibialis posterior tendon - achillis tendonitis - s/p repair - with persistent severe pain   PRN pain meds IV + Oral   PT/OT  # Rest of the chronic medical problems are stable and will be managed with appropriately with home medications, placed nursing communication order to verify home medications before giving them to the patient. # Diet: On PO Diet  # IVF's: No  # Fall Precaution: Yes  # Disposition: Home/primary residence   # Code Status: Full code  # DVT Prophylaxis : Lovenox SC  The patient at bedside was counseled about clinical status, laboratory/imaging results, diagnoses, medication side effects, risk, and treatment plan, all questions were answered to patient's satisfaction and verbalized understanding      SIGNATURE: Tee Gill MD PATIENT NAME: Favio Roach   CONTACT #: Hospitalist on call MRN: [id number]     Disclaimer: Portions of this note may have been generated using Dragon voice recognition software. Reasonable efforts were made to correct any dictation errors that resulted due to the programming of this software but some may still be present.

## 2022-12-06 NOTE — PROGRESS NOTES
Podiatry Progress Note  2022   [facility]       SUBJECTIVE: Romain Martinez is a 52 y.o. female who was admitted postoperatively 2022 for management of postoperative pain. She was seen at bedside this morning it is much better controlled. Resting comfortably this morning with no new complaints no acute events overnight. Denies any constitutional symptoms this morning. Okay for DC from podiatry perspective with outpt followup. Past Medical History:   Diagnosis Date    Abnormal Pap smear 2012    Atypical glandular cells(AGC)    Asthma     no attack since high school    Diabetes mellitus ([facility]     type 2    Dizziness     Headache     Hypertension , ,    during pregnancy not on meds    Memory difficulties     Vertigo         Past Surgical History:   Procedure Laterality Date     SECTION      x 3    DILATION AND CURETTAGE  11    ENDOMETRIAL ABLATION  11    ENDOMETRIAL BIOPSY      FOOT TENDON SURGERY Right 2022    FHL TENDON TRANSFER AND CALCANEAL OSTECTOMY OF RIGHT FOOT (CPT 77720) performed by Zoila Nation DPM at [facility]  11    [facility]  2012    [address]    left salpingectomy for ectopic pregnancy    [place] TOTAL ABDOM HYSTERECTOMY N/A 2018    TOTAL ABDOMINAL HYSTERECTOMY  AND REMOVAL OF RIGHT FALLOPIAN TUBE performed by Veronique Moon MD at [address] [facility]           Family History   Problem Relation Age of Onset    Cancer Father         prostate ca    Hypertension Father     Lupus Mother     Hypertension Mother     Diabetes Brother     Sickle Cell Anemia Daughter     Sickle Cell Trait Daughter     Breast Cancer Maternal Aunt         Social History     Tobacco Use    Smoking status: Never    Smokeless tobacco: Never    Tobacco comments:     only smokes marijuana   Substance Use Topics    Alcohol use:  Yes     Alcohol/week: 1.0 standard drink     Types: 1 Glasses of wine per week     Comment: social        Prior to Admission medications    Medication Sig Start Date End Date Taking? Authorizing Provider   doxycycline hyclate (VIBRA-TABS) 100 MG tablet Take 1 tablet by mouth 2 times daily for 10 days 12/5/22 12/15/22 Yes Alden BHARTI Piper DPALISTAIR   HYDROcodone-acetaminophen (NORCO) 5-325 MG per tablet Take 1 tablet by mouth every 4 hours as needed for Pain for up to 7 days. Intended supply: 7 days. Take lowest dose possible to manage pain 12/5/22 12/12/22 Yes Alden X Fahim, DPM   ibuprofen (IBU) 600 MG tablet Take 1 tablet by mouth every 8 hours as needed for Pain 6/28/21 7/8/21  Ricardo Lynn MD        Aspirin         OBJECTIVE:        Vitals:    12/06/22 0800   BP:    Pulse:    Resp:    Temp:    SpO2: 99%          EXAM:        Pt is AAOx3, NAD. Preoperative physical exam posterior splint in place CDI right lower extremity with dressing CDI left lower extremity. Vascular Exam: Palpable DP and PT pulses bilaterally. Brisk capillary fill time all pedal digits bilaterally. Warm to warm skin temperature proximal lower leg to distal digits bilaterally. Neuro Exam: Epicritic sensation intact    Dermatologic Exam:    -Full-thickness postoperative incisional skin well coapted and sutures in place bilateral posterior Achilles. -Diffuse xerosis bilateral heel. No open wounds or lesions. Pedal hair growth noted. Nails well trimmed and intact. MSK: Muscle strength 4/5 right plantar flexion with pain along the Achilles tendon insertion. Postoperatively patient is in dressing active but painful range of motion bilateral plantarflexion dorsiflexion plantar able to passively dorsiflex plantarflex all digits bilateral.  Ankle joint range of motion intact subtalar joint range of motion intact. .    Current Facility-Administered Medications   Medication Dose Route Frequency Provider Last Rate Last Admin    oxyCODONE-acetaminophen (PERCOCET) 5-325 MG per tablet 1 tablet  1 tablet Oral Q4H PRN Kalpana Scott MD   1 tablet at 12/06/22 3755    HYDROmorphone HCl PF (DILAUDID) injection 0.5 mg  0.5 mg IntraVENous Q3H PRN Renato Coronado MD   0.5 mg at 12/06/22 1321    sodium chloride flush 0.9 % injection 10 mL  10 mL IntraVENous 2 times per day Renato Coronado MD   10 mL at 12/06/22 0734    sodium chloride flush 0.9 % injection 10 mL  10 mL IntraVENous PRN Renato Coronado MD        0.9 % sodium chloride infusion   IntraVENous PRN Renato Coronado MD        enoxaparin (LOVENOX) injection 40 mg  40 mg SubCUTAneous Daily Renato Coronado MD        promethazine (PHENERGAN) tablet 12.5 mg  12.5 mg Oral Q6H PRN Renato Coronado MD   12.5 mg at 12/06/22 0740    Or    ondansetron (ZOFRAN) injection 4 mg  4 mg IntraVENous Q6H PRN Renato Coronado MD   4 mg at 12/06/22 0612    polyethylene glycol (GLYCOLAX) packet 17 g  17 g Oral Daily PRN Renato Coronado MD        acetaminophen (TYLENOL) tablet 650 mg  650 mg Oral Q6H PRN Renato Coronado MD        Or    acetaminophen (TYLENOL) suppository 650 mg  650 mg Rectal Q6H PRN Renato Coronado MD            Lab Results   Component Value Date    WBC 16.6 (H) 12/06/2022    HCT 38.0 12/06/2022    HGB 12.6 12/06/2022     12/06/2022     12/06/2022    K 3.8 12/06/2022     12/06/2022    CO2 22 12/06/2022    BUN 7 12/06/2022    CREATININE 0.5 12/06/2022    GLUCOSE 148 (H) 12/06/2022     ASSESSMENT:  -S/p right Achilles tenotomy, FHL transfer, calcaneal osteotomy (DOS 12/5/2022)  -Admitted for postoperative pain  -Achilles tendinosis right with degenerative tear  -History of cocaine abuse    PLAN:  - Examined and evaluated  - All labs, imaging, and charts reviewed   - WBC: 16.6, likely reactive to surgery  -Proper fluoroscopy calcaneal osteotomy with no acute abnormal changes  -Splint CDI with dressings right lower extremity. Dressing CDI left lower extremity. All dressings can remain intact until first postoperative visit. - Dressings: Xeroform HECTORD Left foot and ankle.  Mayra HAYWOOD posterior splint RLE.   -Nonweightbearing right lower extremity at all times. Weightbearing as tolerated left lower extremity surgical shoe.  -Follow-up with Dr. Lovell Krabbe in the outpatient setting within 1 week of discharge  -Appreciate all pain management and antibiotic input.  -Cleared for DC from podiatry perspective as long as medically cleared and pain is manageable    D/W: Beatriz Kim DPM FACFAS  Fellowship-Trained Foot and Ankle Surgeon  Diplomate, American Board of Foot and Ankle Surgeons  [phone]       Thank you for involving podiatry in this patients care. Please do not hesitate to call with any questions or concerns.      Marion Goodwin Podiatry PGY3  12/6/2022   2:16 PM

## 2022-12-06 NOTE — PROGRESS NOTES
[address] [facility]  [facility] [place]. [place]        Date:2022                                                  Patient Name: Michael Galaviz    MRN: [id number]    : 1975    Room: [address] [place] [address]      Evaluating OT: Yasmin Michael OTR/L; [id number]     Referring Provider and Specific Provider Orders/Date:      22  OT eval and treat  Start:  22,   End:  22,   ONE TIME,   Standing Count:  1 Occurrences,   Jamie Cabrera MD      Placement Recommendation: Subacute vs HHOT with / assistance        Diagnosis:   1. Postoperative pain    2. Tendonitis, Achilles, right    3. Nontraumatic tear of tibialis posterior tendon, right    4. Right foot pain         Surgery:  PROCEDURES:  1. Flexor hallucis longus tendon transfer, right ankle. 2.  Calcaneal ostectomy, right ankle.      Pertinent Medical History:       Past Medical History:   Diagnosis Date    Abnormal Pap smear 2012    Atypical glandular cells(AGC)    Asthma     no attack since high school    Diabetes mellitus ([facility] [address]     type 2    Dizziness     Headache     Hypertension , ,    during pregnancy not on meds    Memory difficulties     Vertigo          Past Surgical History:   Procedure Laterality Date     SECTION      x 3    DILATION AND CURETTAGE  11    ENDOMETRIAL ABLATION  11    ENDOMETRIAL BIOPSY      FOOT TENDON SURGERY Right 2022    FHL TENDON TRANSFER AND CALCANEAL OSTECTOMY OF RIGHT FOOT (CPT 24521) performed by Areli Hidalgo DPM at [address] [facility]  11    [place]  2012    [address]    left salpingectomy for ectopic pregnancy    [place] TOTAL ABDOM HYSTERECTOMY N/A 2018    TOTAL ABDOMINAL HYSTERECTOMY  AND REMOVAL OF RIGHT FALLOPIAN TUBE performed by Esthela Nichole MD at [address] [facility]        Precautions:  Fall Risk, NWB: R LE     Assessment of current deficits\"     [x] Functional mobility  [x]ADLs  [x] Strength               []Cognition    [x] Functional transfers   [x] IADLs         [] Safety Awareness   [x]Endurance    [] Fine Coordination              [x] Balance      [] Vision/perception   [x]Sensation     []Gross Motor Coordination  [x] ROM  [] Delirium                   [] Motor Control     OT PLAN OF CARE   OT POC based on physician orders, patient diagnosis and results of clinical assessment    Frequency/Duration 1-3 days/wk for 2 weeks PRN     Specific OT Treatment Interventions to include:   * Instruction/training on adapted ADL techniques and AE recommendations to increase functional independence within precautions       * Training on energy conservation strategies, correct breathing pattern and techniques to improve independence/tolerance for self-care routine  * Functional transfer/mobility training/DME recommendations for increased independence, safety, and fall prevention  * Patient/Family education to increase follow through with safety techniques and functional independence  * Recommendation of environmental modifications for increased safety with functional transfers/mobility and ADLs  * Therapeutic exercise to improve motor endurance, ROM, and functional strength for ADLs/functional transfers  * Therapeutic activities to facilitate/challenge dynamic balance, stand tolerance for increased safety and independence with ADLs  * Positioning to improve skin integrity, interaction with environment and functional independence    Recommended Adaptive Equipment: TBD at rehab, IF pt returns home she will need a wheeled walker, bedside commode     Home Living: a 16year old daughter with sickle cell resides with her, two additional adult children frequently stay over but she doesn't anticipate they being able to provide assistance; single family home, 1 story, 3+1 to enter with broken rail, tub shower.        Equipment owned: crutches     Prior Level of Function: Independent with ADLs , Independent with IADLs; ambulated with no device    Driving: yes     Pain Level: Pt did not rate level of pain but stated she was having pain in both feet. Nursing notified. Cognition: A&O: 4/4; Follows 2 step directions   Memory: good    Sequencing: good    Problem solving: good    Judgement/safety: fair plus    [facility]   AM-PAC Daily Activity Inpatient   How much help for putting on and taking off regular lower body clothing?: A Little  How much help for Bathing?: A Lot  How much help for Toileting?: A Little  How much help for putting on and taking off regular upper body clothing?: A Little  How much help for taking care of personal grooming?: A Little  How much help for eating meals?: None  AM-PAC Inpatient Daily Activity Raw Score: 18  AM-PAC Inpatient ADL T-Scale Score : 38.66  ADL Inpatient CMS 0-100% Score: 46.65  ADL Inpatient CMS G-Code Modifier : CK     Functional Assessment:    Initial Eval Status  Date: 12/6/22 Treatment Status  Date: STGs = LTGs  Time frame: 10-14 days   Feeding Independent   Independent    Grooming Supervision   Independent    UB Dressing Supervision   Independent    LB Dressing Supervision to don surgical shoe while seated at EOB  Independent    Bathing Moderate Assist  Modified [place]    Toileting Supervision for hygiene after using commode to urinate  Independent    Bed Mobility  Supine to sit: Supervision    Sit to supine: N/T as pt was up in chair   Supine to sit: Independent   Sit to supine: Independent    Functional Transfers Supervision from EOB to crutches upon 1st stand. Supervision from EOB to wheeled walker upon 2st stand with bed height elevated. Transfer training with verbal cues for hand placement throughout session to improve safety. Independent    Functional Mobility Minimal assist with crutches to improve balance, 2 feet, pt reported being dizzy and had to be returned to EOB.    Minimal assist with wheeled walker to improve balance, 15 feet x 2, to and from bathroom, verbal cues for walker sequence and safety. Modified [place]    Balance Sitting:     Static: good     Dynamic: fair   Standing: fair  with crutches and wheeled walker     Activity Tolerance Fair   good    Visual/  Perceptual Glasses: yes                 Hand Dominance: right      AROM (PROM) Strength Additional Info:    RUE  WFL 5/5 good  and wfl FMC/dexterity noted during ADL tasks     LUE WFL 5/5 good  and wfl FMC/dexterity noted during ADL tasks       Hearing: WFL   Sensation:  No c/o numbness or tingling  Tone: WFL   Edema: yes, R LE    Comments: Upon arrival the patient was supine. At end of session, patient was EOB with call light and phone within reach, all lines and tubes intact. Overall patient demonstrated decreased independence and safety during completion of ADL/functional transfer/mobility tasks. Pt would benefit from continued skilled OT to increase safety and independence with completion of ADL/IADL tasks for functional independence and quality of life. Treatment: OT treatment provided this date includes:   Instruction/training on safety and adapted techniques for completion of ADLs   Instruction/training on safe functional mobility/transfer techniques   Instruction/training on energy conservation/work simplification for completion of ADLs   Proper Positioning/Alignment   Instruction/training in weight bearing status and walker sequence   Instruction/training in lower body dressing techniques     Rehab Potential: Good for established goals. Patient / Family Goal: go to rehab       Patient and/or family were instructed on functional diagnosis, prognosis/goals and OT plan of care. Demonstrated good understanding.      Eval Complexity: Low    Time In: 11:15am   Time Out: 11:45am    Total Treatment Time: 10      Min Units   OT Eval Low 97165  X  1    OT Eval Medium 31351      OT Eval High 51092 OT Re-Eval [id number]            ADL/Self Care 90828 8  1    Therapeutic Activities 37369 2       Therapeutic Ex 95119       Orthotic Management 90592       Manual [id number]     Neuro Re-Ed 71721       Non-Billable Time        Evaluation Time additionally includes thorough review of current medical information, gathering information on past medical history/social history and prior level of function, interpretation of standardized testing/informal observation of tasks, assessment of data and development of plan of care and goals.         Evaluating OT: Sharona Wilder OTR/L; [id number]

## 2022-12-06 NOTE — PROGRESS NOTES
[address] [place], [place] [address]ist   Progress Note    Admitting Date and Time: 12/5/2022 10:45 AM  Admit Dx: Tendonitis, Achilles, right [M76.61]  Nontraumatic tear of tibialis posterior tendon, right [M66.871]  Right foot pain [M79.671]  Postoperative pain [G89.18]  Intractable pain [R52]    Subjective:    Pt feels like she is still having a lot of pain in the R foot. She states that she is also a little nauseous but has not had a BM yet since surgery. She states she would be willing to go to [facility], thinks it is the better option for her. Per RN: Nothing to report    ROS: denies fever, chills, cp, sob, n/v, HA unless stated above.     sodium chloride flush  10 mL IntraVENous 2 times per day    enoxaparin  40 mg SubCUTAneous Daily     oxyCODONE-acetaminophen, 1 tablet, Q4H PRN  HYDROmorphone, 0.5 mg, Q3H PRN  sodium chloride flush, 10 mL, PRN  sodium chloride, , PRN  promethazine, 12.5 mg, Q6H PRN   Or  ondansetron, 4 mg, Q6H PRN  polyethylene glycol, 17 g, Daily PRN  acetaminophen, 650 mg, Q6H PRN   Or  acetaminophen, 650 mg, Q6H PRN         Objective:    BP (!) 121/92   Pulse 89   Temp 97.5 °F (36.4 °C) (Oral)   Resp 18   Ht 5' 5\" (1.651 m)   Wt 190 lb (86.2 kg)   LMP 07/16/2018 Comment: always irregular  SpO2 99%   BMI 31.62 kg/m²     General Appearance: alert and oriented to person, place and time and in no acute distress  Skin: warm and dry  Head: normocephalic and atraumatic  Eyes: pupils equal, round, and reactive to light, extraocular eye movements intact, conjunctivae normal  Neck: neck supple and non tender without mass   Pulmonary/Chest: clear to auscultation bilaterally- no wheezes, rales or rhonchi, normal air movement, no respiratory distress  Cardiovascular: normal rate, normal S1 and S2 and no carotid bruits  Abdomen: soft, non-tender, non-distended, normal bowel sounds, no masses or organomegaly  Extremities: no cyanosis, no clubbing.  Dressings on b/l feet, appear C/D/I  Neurologic: no cranial nerve deficit and speech normal      Recent Labs     12/05/22  1115 12/06/22  0414    136   K 3.6 3.8    102   CO2 23 22   BUN 9 7   CREATININE 0.7 0.5   GLUCOSE 100* 148*   CALCIUM 8.9 8.5*       Recent Labs     12/06/22  0414   ALKPHOS 67   PROT 6.9   LABALBU 4.0   BILITOT 0.4   AST 16   ALT 16       Recent Labs     12/05/22  1115 12/06/22  0414   WBC 8.5 16.6*   RBC 4.80 4.73   HGB 12.9 12.6   HCT 38.5 38.0   MCV 80.2 80.3   MCH 26.9 26.6   MCHC 33.5 33.2   RDW 12.3 12.1    288   MPV 9.2 9.6         Radiology:   No orders to display       Assessment:  Principal Problem:    Postoperative pain  Active Problems:    Intractable pain  Resolved Problems:    * No resolved hospital problems. *      Plan:  R achilles tendonitis with degenerative tear  -Had surgery yesterday with podiatry, pain was too severe to go home so was admitted  -Worked with PT/OT today who are recommending [facility] vs [facility], patient seems to prefer [facility]  -Will have SW/CM start working on placement  -Rest of care per podiatry    Leukocytosis  -WBC 16 today, may be reactive  -Patient afebrile  -Will FU tomorrow's CBC    Pre-DM  -Most recent A1C 4/2021 was 6.1  -Will repeat today  -No home meds but can add on ISS if neccesary      NOTE: This report was transcribed using voice recognition software. Every effort was made to ensure accuracy; however, inadvertent computerized transcription errors may be present.      Electronically signed by Miles Mars MD on 12/6/2022 at 12:51 PM

## 2022-12-06 NOTE — PLAN OF CARE
Problem: Chronic Conditions and Co-morbidities  Goal: Patient's chronic conditions and co-morbidity symptoms are monitored and maintained or improved  Outcome: Progressing     Problem: Discharge Planning  Goal: Discharge to home or other facility with appropriate resources  Outcome: Progressing  Flowsheets (Taken 12/5/2022 2015)  Discharge to home or other facility with appropriate resources: Identify barriers to discharge with patient and caregiver     Problem: Pain  Goal: Verbalizes/displays adequate comfort level or baseline comfort level  Outcome: Progressing     Problem: Safety - Adult  Goal: Free from fall injury  Outcome: Progressing     Problem: ABCDS Injury Assessment  Goal: Absence of physical injury  Outcome: Progressing

## 2022-12-07 LAB
ANION GAP SERPL CALCULATED.3IONS-SCNC: 11 MMOL/L (ref 7–16)
BASOPHILS ABSOLUTE: 0.04 E9/L (ref 0–0.2)
BASOPHILS RELATIVE PERCENT: 0.3 % (ref 0–2)
BUN BLDV-MCNC: 8 MG/DL (ref 6–20)
CALCIUM SERPL-MCNC: 8.7 MG/DL (ref 8.6–10.2)
CHLORIDE BLD-SCNC: 102 MMOL/L (ref 98–107)
CO2: 24 MMOL/L (ref 22–29)
CREAT SERPL-MCNC: 0.7 MG/DL (ref 0.5–1)
EOSINOPHILS ABSOLUTE: 0.05 E9/L (ref 0.05–0.5)
EOSINOPHILS RELATIVE PERCENT: 0.4 % (ref 0–6)
GFR SERPL CREATININE-BSD FRML MDRD: >60 ML/MIN/1.73
GLUCOSE BLD-MCNC: 116 MG/DL (ref 74–99)
HCT VFR BLD CALC: 37.6 % (ref 34–48)
HEMOGLOBIN: 12.7 G/DL (ref 11.5–15.5)
IMMATURE GRANULOCYTES #: 0.06 E9/L
IMMATURE GRANULOCYTES %: 0.5 % (ref 0–5)
LYMPHOCYTES ABSOLUTE: 4.65 E9/L (ref 1.5–4)
LYMPHOCYTES RELATIVE PERCENT: 37 % (ref 20–42)
MCH RBC QN AUTO: 27.1 PG (ref 26–35)
MCHC RBC AUTO-ENTMCNC: 33.8 % (ref 32–34.5)
MCV RBC AUTO: 80.2 FL (ref 80–99.9)
METER GLUCOSE: 133 MG/DL (ref 74–99)
METER GLUCOSE: 184 MG/DL (ref 74–99)
MONOCYTES ABSOLUTE: 0.95 E9/L (ref 0.1–0.95)
MONOCYTES RELATIVE PERCENT: 7.6 % (ref 2–12)
NEUTROPHILS ABSOLUTE: 6.83 E9/L (ref 1.8–7.3)
NEUTROPHILS RELATIVE PERCENT: 54.2 % (ref 43–80)
PDW BLD-RTO: 12.6 FL (ref 11.5–15)
PLATELET # BLD: 268 E9/L (ref 130–450)
PMV BLD AUTO: 9.2 FL (ref 7–12)
POTASSIUM SERPL-SCNC: 3.5 MMOL/L (ref 3.5–5)
RBC # BLD: 4.69 E12/L (ref 3.5–5.5)
SODIUM BLD-SCNC: 137 MMOL/L (ref 132–146)
WBC # BLD: 12.6 E9/L (ref 4.5–11.5)

## 2022-12-07 PROCEDURE — 82962 GLUCOSE BLOOD TEST: CPT

## 2022-12-07 PROCEDURE — 6370000000 HC RX 637 (ALT 250 FOR IP): Performed by: INTERNAL MEDICINE

## 2022-12-07 PROCEDURE — 2580000003 HC RX 258: Performed by: INTERNAL MEDICINE

## 2022-12-07 PROCEDURE — 85025 COMPLETE CBC W/AUTO DIFF WBC: CPT

## 2022-12-07 PROCEDURE — 6360000002 HC RX W HCPCS: Performed by: STUDENT IN AN ORGANIZED HEALTH CARE EDUCATION/TRAINING PROGRAM

## 2022-12-07 PROCEDURE — 80048 BASIC METABOLIC PNL TOTAL CA: CPT

## 2022-12-07 PROCEDURE — 36415 COLL VENOUS BLD VENIPUNCTURE: CPT

## 2022-12-07 PROCEDURE — G0378 HOSPITAL OBSERVATION PER HR: HCPCS

## 2022-12-07 PROCEDURE — 6360000002 HC RX W HCPCS: Performed by: INTERNAL MEDICINE

## 2022-12-07 PROCEDURE — 6370000000 HC RX 637 (ALT 250 FOR IP): Performed by: STUDENT IN AN ORGANIZED HEALTH CARE EDUCATION/TRAINING PROGRAM

## 2022-12-07 PROCEDURE — 97530 THERAPEUTIC ACTIVITIES: CPT

## 2022-12-07 PROCEDURE — 96376 TX/PRO/DX INJ SAME DRUG ADON: CPT

## 2022-12-07 PROCEDURE — 1200000000 HC SEMI PRIVATE

## 2022-12-07 PROCEDURE — 99225 PR SBSQ OBSERVATION CARE/DAY 25 MINUTES: CPT | Performed by: STUDENT IN AN ORGANIZED HEALTH CARE EDUCATION/TRAINING PROGRAM

## 2022-12-07 RX ORDER — INSULIN LISPRO 100 [IU]/ML
0-4 INJECTION, SOLUTION INTRAVENOUS; SUBCUTANEOUS NIGHTLY
Status: DISCONTINUED | OUTPATIENT
Start: 2022-12-07 | End: 2022-12-09

## 2022-12-07 RX ORDER — HYDROMORPHONE HYDROCHLORIDE 1 MG/ML
0.25 INJECTION, SOLUTION INTRAMUSCULAR; INTRAVENOUS; SUBCUTANEOUS
Status: DISCONTINUED | OUTPATIENT
Start: 2022-12-07 | End: 2022-12-10 | Stop reason: HOSPADM

## 2022-12-07 RX ORDER — DEXTROSE MONOHYDRATE 100 MG/ML
INJECTION, SOLUTION INTRAVENOUS CONTINUOUS PRN
Status: DISCONTINUED | OUTPATIENT
Start: 2022-12-07 | End: 2022-12-10 | Stop reason: HOSPADM

## 2022-12-07 RX ORDER — METHOCARBAMOL 500 MG/1
750 TABLET, FILM COATED ORAL EVERY 8 HOURS PRN
Status: DISCONTINUED | OUTPATIENT
Start: 2022-12-07 | End: 2022-12-10 | Stop reason: HOSPADM

## 2022-12-07 RX ORDER — INSULIN LISPRO 100 [IU]/ML
0-4 INJECTION, SOLUTION INTRAVENOUS; SUBCUTANEOUS
Status: DISCONTINUED | OUTPATIENT
Start: 2022-12-07 | End: 2022-12-09

## 2022-12-07 RX ADMIN — OXYCODONE AND ACETAMINOPHEN 1 TABLET: 5; 325 TABLET ORAL at 23:05

## 2022-12-07 RX ADMIN — FAMOTIDINE 20 MG: 20 TABLET, FILM COATED ORAL at 20:26

## 2022-12-07 RX ADMIN — HYDROMORPHONE HYDROCHLORIDE 0.25 MG: 1 INJECTION, SOLUTION INTRAMUSCULAR; INTRAVENOUS; SUBCUTANEOUS at 20:27

## 2022-12-07 RX ADMIN — OXYCODONE AND ACETAMINOPHEN 1 TABLET: 5; 325 TABLET ORAL at 13:52

## 2022-12-07 RX ADMIN — OXYCODONE AND ACETAMINOPHEN 1 TABLET: 5; 325 TABLET ORAL at 18:58

## 2022-12-07 RX ADMIN — METHOCARBAMOL 750 MG: 500 TABLET ORAL at 16:19

## 2022-12-07 RX ADMIN — DIPHENHYDRAMINE HYDROCHLORIDE 50 MG: 25 TABLET ORAL at 08:08

## 2022-12-07 RX ADMIN — SODIUM CHLORIDE, PRESERVATIVE FREE 10 ML: 5 INJECTION INTRAVENOUS at 20:28

## 2022-12-07 RX ADMIN — FAMOTIDINE 20 MG: 20 TABLET, FILM COATED ORAL at 07:25

## 2022-12-07 RX ADMIN — HYDROMORPHONE HYDROCHLORIDE 0.5 MG: 1 INJECTION, SOLUTION INTRAMUSCULAR; INTRAVENOUS; SUBCUTANEOUS at 02:58

## 2022-12-07 RX ADMIN — OXYCODONE AND ACETAMINOPHEN 1 TABLET: 5; 325 TABLET ORAL at 09:16

## 2022-12-07 RX ADMIN — HYDROMORPHONE HYDROCHLORIDE 0.5 MG: 1 INJECTION, SOLUTION INTRAMUSCULAR; INTRAVENOUS; SUBCUTANEOUS at 07:26

## 2022-12-07 RX ADMIN — ACETAMINOPHEN 650 MG: 325 TABLET ORAL at 22:41

## 2022-12-07 RX ADMIN — OXYCODONE AND ACETAMINOPHEN 1 TABLET: 5; 325 TABLET ORAL at 04:41

## 2022-12-07 RX ADMIN — PROMETHAZINE HYDROCHLORIDE 12.5 MG: 25 TABLET ORAL at 08:10

## 2022-12-07 RX ADMIN — HYDROMORPHONE HYDROCHLORIDE 0.25 MG: 1 INJECTION, SOLUTION INTRAMUSCULAR; INTRAVENOUS; SUBCUTANEOUS at 16:14

## 2022-12-07 RX ADMIN — POLYETHYLENE GLYCOL 3350 17 G: 17 POWDER, FOR SOLUTION ORAL at 13:00

## 2022-12-07 RX ADMIN — METHOCARBAMOL 750 MG: 500 TABLET ORAL at 20:26

## 2022-12-07 RX ADMIN — HYDROMORPHONE HYDROCHLORIDE 0.25 MG: 1 INJECTION, SOLUTION INTRAMUSCULAR; INTRAVENOUS; SUBCUTANEOUS at 11:17

## 2022-12-07 RX ADMIN — METHOCARBAMOL 500 MG: 500 TABLET ORAL at 06:00

## 2022-12-07 RX ADMIN — DIPHENHYDRAMINE HYDROCHLORIDE 50 MG: 25 TABLET ORAL at 00:07

## 2022-12-07 ASSESSMENT — PAIN SCALES - GENERAL
PAINLEVEL_OUTOF10: 6
PAINLEVEL_OUTOF10: 6
PAINLEVEL_OUTOF10: 8
PAINLEVEL_OUTOF10: 8
PAINLEVEL_OUTOF10: 6
PAINLEVEL_OUTOF10: 8
PAINLEVEL_OUTOF10: 6
PAINLEVEL_OUTOF10: 7
PAINLEVEL_OUTOF10: 8
PAINLEVEL_OUTOF10: 9
PAINLEVEL_OUTOF10: 8
PAINLEVEL_OUTOF10: 1

## 2022-12-07 ASSESSMENT — PAIN - FUNCTIONAL ASSESSMENT
PAIN_FUNCTIONAL_ASSESSMENT: ACTIVITIES ARE NOT PREVENTED
PAIN_FUNCTIONAL_ASSESSMENT: ACTIVITIES ARE NOT PREVENTED

## 2022-12-07 ASSESSMENT — PAIN DESCRIPTION - DESCRIPTORS
DESCRIPTORS: ACHING
DESCRIPTORS: ACHING;SHARP
DESCRIPTORS: SPASM
DESCRIPTORS: THROBBING
DESCRIPTORS: ACHING;SHARP
DESCRIPTORS: SPASM
DESCRIPTORS: SHARP

## 2022-12-07 ASSESSMENT — PAIN DESCRIPTION - ORIENTATION
ORIENTATION: LEFT;RIGHT
ORIENTATION: RIGHT;LEFT
ORIENTATION: RIGHT
ORIENTATION: LEFT;RIGHT
ORIENTATION: RIGHT;LEFT

## 2022-12-07 ASSESSMENT — PAIN DESCRIPTION - LOCATION
LOCATION: LEG
LOCATION: LEG
LOCATION: FOOT
LOCATION: FOOT
LOCATION: FOOT;LEG
LOCATION: LEG
LOCATION: FOOT
LOCATION: LEG

## 2022-12-07 NOTE — ANESTHESIA POSTPROCEDURE EVALUATION
Department of Anesthesiology  Postprocedure Note    Patient: Hanane Wade  MRN: [id number]  YOB: 1975  Date of evaluation: 12/7/2022      Procedure Summary     Date: 12/05/22 Room / Location: [address] [place] [address] / [address] [place] [address]    Anesthesia Start: 8519 Anesthesia Stop: 9523    Procedure: FHL TENDON TRANSFER AND CALCANEAL OSTECTOMY OF RIGHT FOOT (CPT 97364) (Right: Foot) Diagnosis:       Tendonitis, Achilles, right      Nontraumatic tear of tibialis posterior tendon, right      Right foot pain      (Tendonitis, Achilles, right [M76.61])      (Nontraumatic tear of tibialis posterior tendon, right [S45.847])      (Right foot pain [M79.671])    Surgeons: Candy Aguilar DPM Responsible Provider: Jarad Brito DO    Anesthesia Type: MAC, general ASA Status: 3          Anesthesia Type: No value filed.     Evi Phase I: Evi Score: 9    Eiv Phase II: Evi Score: 10      Anesthesia Post Evaluation    Patient location during evaluation: PACU  Patient participation: complete - patient participated  Level of consciousness: awake and alert  Airway patency: patent  Nausea & Vomiting: no nausea and no vomiting  Complications: no  Cardiovascular status: hemodynamically stable  Respiratory status: acceptable  Hydration status: euvolemic

## 2022-12-07 NOTE — PROGRESS NOTES
Physical Therapy Treatment Note/Plan of Care    Room #:  0330/0330-02  Patient Name: Alvarado Luong  YOB: 1975  MRN: [id number]    Date of Service: 12/7/2022     Tentative placement recommendation: Subacute vs Home Health Physical Therapy if patient meets goals  Equipment recommendation: To be determined      Evaluating Physical Therapist: Lynn Cahn, PT #[id number]      Specific Provider Orders/Date/Referring Provider :  12/06/22 0745    PT eval and treat  Start:  12/06/22 0745,   End:  12/06/22 0745,   ONE TIME,   Standing Count:  1 Occurrences,   Edgar Mendoza MD     Admitting Diagnosis:   Tendonitis, Achilles, right [M76.61]  Nontraumatic tear of tibialis posterior tendon, right [M66.871]  Right foot pain [M79.671]  Postoperative pain [G89.18]  Intractable pain [R52]       Surgery:   Date of Procedure: 12/5/2022  Procedure(s):  FHL TENDON TRANSFER AND CALCANEAL OSTECTOMY OF RIGHT FOOT (CPT 15735)  Surgeon(s):    Alden Maldonado DPM  Visit Diagnoses         Codes    Tendonitis, Achilles, right     M76.61    Nontraumatic tear of tibialis posterior tendon, right     M66.871    Right foot pain     M79.671            Patient Active Problem List   Diagnosis    Abnormal Pap smear    AGCUS (atypical glandular cells of undetermined significance) on Pap smear    Pelvic pain in female    Abscess of submandibular region    Postoperative pain    Intractable pain        ASSESSMENT of Current Deficits Patient exhibits decreased strength, balance, endurance, range of motion, and pain    impairing functional mobility, transfers, gait , gait distance, and tolerance to activity patient can be impulsive at times and displays impaired safety awareness: both factors that can increase her risk for falls. Pt able to maneuver the knee scooter for a short distance but was apprehensive about using it for longer distances. Pt stated she was not comfortable using the crutches due to balance issues.  atient requires skilled physical therapy to address concerns listed above to increase safety and independence at discharge. PHYSICAL THERAPY  PLAN OF CARE       Physical therapy plan of care is established based on physician order,  patient diagnosis and clinical assessment    Current Treatment Recommendations:    -Standing Balance: Perform sit to stand activities maintaining NWB (non-weight bearing) weightbearing right lower extremity   -Transfers: Cues for hand placement, technique and safety. Provide stabilization to prevent fall  and Provide instruction on proper hand and left lower extremity placement to maintain NWB (non-weight bearing) weightbearing right lower extremity   -Gait: Gait training and Use of Assistive device for NWB (non-weight bearing) weight bearing right lower extremity     -Endurance: Utilize Supervised activities to increase level of endurance to allow for safe functional mobility including transfers and gait   -Stairs: Stair training with instruction on proper technique and hand placement on rail to maintain NWB (non-weight bearing) weightbearing right lower extremity     PT long term treatment goals are located in below grid    Patient and or family understand(s) diagnosis, prognosis, and plan of care. Frequency of treatments: Patient will be seen  twice daily.          Prior Level of Function: Patient ambulated independently    Rehab Potential: fair + for baseline    Past medical history:   Past Medical History:   Diagnosis Date    Abnormal Pap smear 2012    Atypical glandular cells(AGC)    Asthma     no attack since high school    Diabetes mellitus ([facility] [address]     type 2    Dizziness     Headache     Hypertension , ,    during pregnancy not on meds    Memory difficulties     Vertigo      Past Surgical History:   Procedure Laterality Date     SECTION      x 3    DILATION AND CURETTAGE  11    ENDOMETRIAL ABLATION  11    ENDOMETRIAL BIOPSY      FOOT TENDON SURGERY Right 12/5/2022    FHL TENDON TRANSFER AND CALCANEAL OSTECTOMY OF RIGHT FOOT (CPT 50445) performed by Dank Beard DPM at [address]    HYSTEROSCOPY  1/5/11    LEE  feb 2012    [address]    left salpingectomy for ectopic pregnancy    AK TOTAL ABDOM HYSTERECTOMY N/A 11/28/2018    TOTAL ABDOMINAL HYSTERECTOMY  AND REMOVAL OF RIGHT FALLOPIAN TUBE performed by Monie Gee MD at [address] [facility]  2006       SUBJECTIVE:    Precautions: Bedrest with bathroom Privileges , falls , non weight bearing  Right lower extremity     Imaging results: Fluoro For Surgical Procedures    Result Date: 12/5/2022  EXAMINATION: SPOT FLUOROSCOPIC IMAGES 12/5/2022 2:38 pm     Intraprocedural fluoroscopic spot images as above. See separate procedure report for more information. Social history: Patient lives with 3 children and grandson  in a ranch home  with 3 steps  to enter with Rail  Tub shower      Equipment owned: [employer],       [employer] [facility]   How much difficulty turning over in bed?: None  How much difficulty sitting down on / standing up from a chair with arms?: A Little  How much difficulty moving from lying on back to sitting on side of bed?: None  How much help from another person moving to and from a bed to a chair?: A Little  How much help from another person needed to walk in hospital room?: A Lot  How much help from another person for climbing 3-5 steps with a railing?: A Lot  AM-PAC Inpatient Mobility Raw Score : 18  AM-PAC Inpatient T-Scale Score : 43.63  Mobility Inpatient CMS 0-100% Score: 46.58  Mobility Inpatient CMS G-Code Modifier : CK    Nursing cleared patient for PT treatment. OBJECTIVE;   Initial Evaluation  Date: 12/6/2022 Treatment Date:  12/7/2022       Short Term/ Long Term   Goals   Was pt agreeable to Eval/treatment?  Yes yes To be met in 3 days   Pain level   6/10  Right lower extremity  6/10  Right lower extremity    Bed Mobility    Rolling: Not assessed     Supine to sit: Independent    Sit to supine: Independent    Scooting: Independent   Rolling: Independent   Supine to sit: Independent   Sit to supine: Independent   Scooting: Independent    Rolling: Independent    Supine to sit: Independent    Sit to supine: Independent    Scooting: Independent     Transfers Sit to stand: Minimal assist of 1 first rep, supervision thereafter  Cues for hand placement and safety  Sit to stand: Minimal assist of 1 Cues for hand placement and safety     Sit to stand: Modified Independent     Ambulation     1 x 20 feet wheeled walker, 2 x 15 feet using  crutches with Minimal progressing to supervision   for balance and safety and cues for sequencing, NWB (non-weight bearing) weight bearing right lower extremity, and safety 2 x 25 feet using  knee scooter with Minimal assist of 1   for balance, NWB weight bearing Right, safety, and decreased speed, turning/steering of knee scooter.      30 feet using  crutches with Modified Independent non weight bearing Right lower extremity    Stair negotiation: ascended and descended   Not assessed     3 steps 1 rail and crutch with minimal assist   ROM Within functional limits    Increase range of motion 10% of affected joints    Strength BUE:  refer to OT eval  RLE:   not assessed    LLE:  4+/5  Increase strength in affected mm groups by 1/3 grade   Balance Sitting EOB:  good -  Dynamic Standing:  fair +wheeled walker or crutches Sitting EOB: good  Dynamic Standing: fair   Sitting EOB:  good    Dynamic Standing: good -crutches     Patient is Alert & Oriented x person, place, time, and situation and follows directions    Sensation:  Patient  reports numbness/tingling bilateral lower extremities     Edema:  yes bilateral lower extremities right > left  Endurance: fair  +    Vitals: room air   Blood Pressure at rest  Blood Pressure during session    Heart Rate at rest   Heart Rate during session     SPO2 at rest  % SPO2 during session  %     Patient education  Patient educated on role of Physical Therapy, risks of immobility, safety and plan of care,  importance of mobility while in hospital , ankle pumps, quad set and glut set for edema control, blood clot prevention, and weight bearing status      Patient response to education:   Pt verbalized understanding Pt demonstrated skill Pt requires further education in this area   Yes Partial Yes      Treatment:  Patient practiced and was instructed/facilitated in the following treatment: Patient assisted to edge of bed,   Sat edge of bed 15 minutes with Supervision  to increase dynamic sitting balance and activity tolerance. Pt instructed and demonstrated knee scooter safety features, how to get on/off knee scooter and still adhere to NWB on RLE, and safety. Pt stood, got her RLE on the knee scooter, made it into the hallway just outside her door, and needed to go back due to her c/o spasms in both feet. Pt returned to the edge of the bed and then to supine. Answered multiple questions about rehab, what the expectations are and what tasks are going to be worked on. Therapeutic Exercises:  not performed      At end of session, patient in bed with     call light and phone within reach,  all lines and tubes intact, nursing notified. Patient would benefit from continued skilled Physical Therapy to improve functional independence and quality of life. Patient's/ family goals   home    Time in  15:25  Time out  15:50    Total Treatment Time  25 minutes      CPT codes:    Therapeutic activities (75306)   25 minutes  2 unit(s)    Beth Mejia  [facility] [place]  LIC # 20305

## 2022-12-07 NOTE — PLAN OF CARE
Problem: Chronic Conditions and Co-morbidities  Goal: Patient's chronic conditions and co-morbidity symptoms are monitored and maintained or improved  Outcome: Progressing     Problem: Discharge Planning  Goal: Discharge to home or other facility with appropriate resources  12/7/2022 1055 by Kai Pantoja RN  Outcome: Progressing  12/7/2022 0638 by Nancy Rush RN  Outcome: Progressing     Problem: Pain  Goal: Verbalizes/displays adequate comfort level or baseline comfort level  12/7/2022 1055 by Kai Pantoja RN  Outcome: Progressing  12/7/2022 0638 by Nancy Rush RN  Outcome: Not Progressing     Problem: Safety - Adult  Goal: Free from fall injury  12/7/2022 0638 by Nancy Rush RN  Outcome: Progressing     Problem: ABCDS Injury Assessment  Goal: Absence of physical injury  12/7/2022 1055 by Kai Pantoja RN  Outcome: Progressing  12/7/2022 0638 by Nancy Rush RN  Outcome: Progressing     Problem: Pain  Goal: Verbalizes/displays adequate comfort level or baseline comfort level  12/7/2022 1055 by Kai Pantoja RN  Outcome: Progressing  12/7/2022 0638 by Nancy Rush RN  Outcome: Not Progressing

## 2022-12-07 NOTE — CARE COORDINATION
CM note: After speaking with patient for additional choices, pt gave [facility] and [facility]. Referral made.   After review of chart Pt has been declined by [facility] (will not be able to go to any [facility] facility)

## 2022-12-07 NOTE — PLAN OF CARE
Problem: Discharge Planning  Goal: Discharge to home or other facility with appropriate resources  Outcome: Progressing     Problem: Pain  Goal: Verbalizes/displays adequate comfort level or baseline comfort level  Outcome: Not Progressing     Problem: Safety - Adult  Goal: Free from fall injury  Outcome: Progressing     Problem: ABCDS Injury Assessment  Goal: Absence of physical injury  Outcome: Progressing     Problem: Pain  Goal: Verbalizes/displays adequate comfort level or baseline comfort level  Outcome: Not Progressing

## 2022-12-07 NOTE — CARE COORDINATION
CM note: Pt provided three SNF choices. Called patient's first choice of [facility], however patient is too young for them per their admission policy. Also tried patient's second choice of [facility]. They too are unable to accept d/t age. Will discuss with patient and obtain additional choices.

## 2022-12-07 NOTE — PROGRESS NOTES
Podiatry Progress Note  2022   Trice Clement       SUBJECTIVE: Dominique Pacheco is a 52 y.o. female who was admitted postoperatively 2022 for management of postoperative pain. States she had 1 episode of emesis and has been experiencing nausea. Otherwise denies any other constitutional symptoms today. Okay for DC from podiatry perspective with outpt followup. Past Medical History:   Diagnosis Date    Abnormal Pap smear 2012    Atypical glandular cells(AGC)    Asthma     no attack since high school    Diabetes mellitus ([facility] [address]     type 2    Dizziness     Headache     Hypertension , ,    during pregnancy not on meds    Memory difficulties     Vertigo         Past Surgical History:   Procedure Laterality Date     SECTION      x 3    DILATION AND CURETTAGE  11    ENDOMETRIAL ABLATION  11    ENDOMETRIAL BIOPSY      FOOT TENDON SURGERY Right 2022    FHL TENDON TRANSFER AND CALCANEAL OSTECTOMY OF RIGHT FOOT (CPT 68826) performed by Sheela Gonzales DPM at [facility]  11    LEEP  2012    [address]    left salpingectomy for ectopic pregnancy    CT TOTAL ABDOM HYSTERECTOMY N/A 2018    TOTAL ABDOMINAL HYSTERECTOMY  AND REMOVAL OF RIGHT FALLOPIAN TUBE performed by Josué Gordon MD at [address] [place] [address]           Family History   Problem Relation Age of Onset    Cancer Father         prostate ca    Hypertension Father     Lupus Mother     Hypertension Mother     Diabetes Brother     Sickle Cell Anemia Daughter     Sickle Cell Trait Daughter     Breast Cancer Maternal Aunt         Social History     Tobacco Use    Smoking status: Never    Smokeless tobacco: Never    Tobacco comments:     only smokes marijuana   Substance Use Topics    Alcohol use:  Yes     Alcohol/week: 1.0 standard drink     Types: 1 Glasses of wine per week     Comment: social        Prior to Admission medications    Medication Sig Start Date End Date Taking? Authorizing Provider   doxycycline hyclate (VIBRA-TABS) 100 MG tablet Take 1 tablet by mouth 2 times daily for 10 days 12/5/22 12/15/22 Yes Alden X Feliz, DPM   HYDROcodone-acetaminophen (NORCO) 5-325 MG per tablet Take 1 tablet by mouth every 4 hours as needed for Pain for up to 7 days. Intended supply: 7 days. Take lowest dose possible to manage pain 12/5/22 12/12/22 Yes Alden X Fahim, DPM   ibuprofen (IBU) 600 MG tablet Take 1 tablet by mouth every 8 hours as needed for Pain 6/28/21 7/8/21  Ricardo Reaves MD        Aspirin         OBJECTIVE:        Vitals:    12/07/22 1117   BP:    Pulse:    Resp: 20   Temp:    SpO2:           EXAM:        Pt is AAOx3, NAD. Preoperative physical exam posterior splint in place CDI right lower extremity with dressing CDI left lower extremity. Vascular Exam: Palpable DP and PT pulses bilaterally. Brisk capillary fill time all pedal digits bilaterally. Warm to warm skin temperature proximal lower leg to distal digits bilaterally. Neuro Exam: Epicritic sensation intact    Dermatologic Exam:    -Full-thickness postoperative incisional skin well coapted and sutures in place bilateral posterior Achilles. -Diffuse xerosis bilateral heel. No open wounds or lesions. Pedal hair growth noted. Nails well trimmed and intact. MSK: Muscle strength 4/5 right plantar flexion with pain along the Achilles tendon insertion. Postoperatively patient is in dressing active but painful range of motion bilateral plantarflexion dorsiflexion plantar able to passively dorsiflex plantarflex all digits bilateral.  Ankle joint range of motion intact subtalar joint range of motion intact. .    Current Facility-Administered Medications   Medication Dose Route Frequency Provider Last Rate Last Admin    HYDROmorphone HCl PF (DILAUDID) injection 0.25 mg  0.25 mg IntraVENous Q3H PRN Nenita Salas MD   0.25 mg at 12/07/22 1117    methocarbamol (ROBAXIN) tablet 750 mg  750 mg Oral Q8H PRN Lv Medina MD        oxyCODONE-acetaminophen (PERCOCET) 5-325 MG per tablet 1 tablet  1 tablet Oral Q4H PRN Adriane Bar MD   1 tablet at 12/07/22 0916    famotidine (PEPCID) tablet 20 mg  20 mg Oral BID Lv Medina MD   20 mg at 12/07/22 0725    diphenhydrAMINE (BENADRYL) tablet 50 mg  50 mg Oral Q8H PRN Adriane Bar MD   50 mg at 12/07/22 7257    sodium chloride flush 0.9 % injection 10 mL  10 mL IntraVENous 2 times per day Adriane Bar MD   10 mL at 12/06/22 0734    sodium chloride flush 0.9 % injection 10 mL  10 mL IntraVENous PRN Adriane Bar MD        0.9 % sodium chloride infusion   IntraVENous PRN Adriane Bar MD        enoxaparin (LOVENOX) injection 40 mg  40 mg SubCUTAneous Daily Adriane Bar MD        promethazine (PHENERGAN) tablet 12.5 mg  12.5 mg Oral Q6H PRN Adriane Bar MD   12.5 mg at 12/07/22 0810    Or    ondansetron (ZOFRAN) injection 4 mg  4 mg IntraVENous Q6H PRN Adriane Bar MD   4 mg at 12/06/22 1915    polyethylene glycol (GLYCOLAX) packet 17 g  17 g Oral Daily PRN Adriane Bar MD        acetaminophen (TYLENOL) tablet 650 mg  650 mg Oral Q6H PRN Adriane Bar MD   650 mg at 12/06/22 1435    Or    acetaminophen (TYLENOL) suppository 650 mg  650 mg Rectal Q6H PRN Adriane Bar MD            Lab Results   Component Value Date    WBC 12.6 (H) 12/07/2022    HCT 37.6 12/07/2022    HGB 12.7 12/07/2022     12/07/2022     12/07/2022    K 3.5 12/07/2022     12/07/2022    CO2 24 12/07/2022    BUN 8 12/07/2022    CREATININE 0.7 12/07/2022    GLUCOSE 116 (H) 12/07/2022     ASSESSMENT:  -S/p right Achilles tenotomy, FHL transfer, calcaneal osteotomy (DOS 12/5/2022)  -Admitted for postoperative pain  -Achilles tendinosis right with degenerative tear  -History of cocaine abuse    PLAN:  - Examined and evaluated  - All labs, imaging, and charts reviewed   - WBC: 12.6, likely reactive to surgery  -Splint CDI with dressings right lower extremity. Dressing CDI left lower extremity. All dressings can remain intact until first postoperative visit. - Dressings: Xeroform DSD Left foot and ankle. Ruthellen Lasso DSD posterior splint RLE.   -Nonweightbearing right lower extremity at all times. Weightbearing as tolerated left lower extremity surgical shoe.  -Follow-up with Dr. Jefe Ivan in the outpatient setting within 1 week of discharge  -Appreciate all pain management and antibiotic input.  -Cleared for DC from podiatry perspective as long as medically cleared and pain is manageable    D/W: Donzella Riedel, DPM FACFAS  Fellowship-Trained Foot and Ankle Surgeon  Diplomate, American Board of Foot and Ankle Surgeons  [phone]       Thank you for involving podiatry in this patients care. Please do not hesitate to call with any questions or concerns.      Era Grant Podiatry PGY3  12/7/2022   11:31 AM

## 2022-12-07 NOTE — CARE COORDINATION
CM note: Referral called to central intake for [facility]. Spoke with Monica Gallagher, she will review and call CM back with a determination.

## 2022-12-07 NOTE — PROGRESS NOTES
[address] [place], [place] [address]ist   Progress Note    Admitting Date and Time: 12/5/2022 10:45 AM  Admit Dx: Tendonitis, Achilles, right [M76.61]  Nontraumatic tear of tibialis posterior tendon, right [M66.871]  Right foot pain [M79.671]  Postoperative pain [G89.18]  Intractable pain [R52]    Subjective:    Pt feels like she is still having a lot of pain in the R foot. She also is complaining of muscle spasms that are very bothersome. She states she picked a facility for [facility]. Per RN: Patient asking for a lot of pain medication    ROS: denies fever, chills, cp, sob, n/v, HA unless stated above.      famotidine  20 mg Oral BID    sodium chloride flush  10 mL IntraVENous 2 times per day    enoxaparin  40 mg SubCUTAneous Daily     HYDROmorphone, 0.25 mg, Q3H PRN  methocarbamol, 750 mg, Q8H PRN  oxyCODONE-acetaminophen, 1 tablet, Q4H PRN  diphenhydrAMINE, 50 mg, Q8H PRN  sodium chloride flush, 10 mL, PRN  sodium chloride, , PRN  promethazine, 12.5 mg, Q6H PRN   Or  ondansetron, 4 mg, Q6H PRN  polyethylene glycol, 17 g, Daily PRN  acetaminophen, 650 mg, Q6H PRN   Or  acetaminophen, 650 mg, Q6H PRN       Objective:    BP (!) 158/93   Pulse 75   Temp 97.9 °F (36.6 °C) (Oral)   Resp 20   Ht 5' 5\" (1.651 m)   Wt 190 lb (86.2 kg)   LMP 07/16/2018 Comment: always irregular  SpO2 99%   BMI 31.62 kg/m²     General Appearance: alert and oriented to person, place and time and in no acute distress  Skin: warm and dry  Head: normocephalic and atraumatic  Eyes: pupils equal, round, and reactive to light, extraocular eye movements intact, conjunctivae normal  Neck: neck supple and non tender without mass   Pulmonary/Chest: clear to auscultation bilaterally- no wheezes, rales or rhonchi, normal air movement, no respiratory distress  Cardiovascular: normal rate, normal S1 and S2 and no carotid bruits  Abdomen: soft, non-tender, non-distended, normal bowel sounds, no masses or organomegaly  Extremities: no cyanosis, no clubbing. Dressings on b/l feet, appear C/D/I  Neurologic: no cranial nerve deficit and speech normal      Recent Labs     12/05/22  1115 12/06/22  0414 12/07/22  0746    136 137   K 3.6 3.8 3.5    102 102   CO2 23 22 24   BUN 9 7 8   CREATININE 0.7 0.5 0.7   GLUCOSE 100* 148* 116*   CALCIUM 8.9 8.5* 8.7       Recent Labs     12/06/22  0414   ALKPHOS 67   PROT 6.9   LABALBU 4.0   BILITOT 0.4   AST 16   ALT 16       Recent Labs     12/05/22  1115 12/06/22  0414 12/07/22  0746   WBC 8.5 16.6* 12.6*   RBC 4.80 4.73 4.69   HGB 12.9 12.6 12.7   HCT 38.5 38.0 37.6   MCV 80.2 80.3 80.2   MCH 26.9 26.6 27.1   MCHC 33.5 33.2 33.8   RDW 12.3 12.1 12.6    288 268   MPV 9.2 9.6 9.2         Radiology:   No orders to display       Assessment:  Principal Problem:    Postoperative pain  Active Problems:    Intractable pain  Resolved Problems:    * No resolved hospital problems. *      Plan:  R achilles tendonitis with degenerative tear  -Had surgery yesterday with podiatry, pain was too severe to go home so was admitted  -Currently on oxycodone, dilaudid and robaxin. Patient complaining mostly of muscle spasms so will increase robaxin and decrease dilaudid.  -Worked with PT/OT today who are recommending [facility] vs [facility], patient seems to prefer [facility]  -Will have SW/CM start working on placement, two places have denied as patient too young. Will follow for updates. -Rest of care per podiatry    Leukocytosis  -WBC 12 today, may be reactive  -Patient afebrile    DM  -Most recent A1C 4/2021 was 6.1, now is 6.6   -Will start on ISS, should start metformin at discharge or on FU with PCP      NOTE: This report was transcribed using voice recognition software. Every effort was made to ensure accuracy; however, inadvertent computerized transcription errors may be present.      Electronically signed by Gilmer Johnson MD on 12/7/2022 at 11:27 AM

## 2022-12-08 LAB
ANION GAP SERPL CALCULATED.3IONS-SCNC: 10 MMOL/L (ref 7–16)
BASOPHILS ABSOLUTE: 0.04 E9/L (ref 0–0.2)
BASOPHILS RELATIVE PERCENT: 0.5 % (ref 0–2)
BUN BLDV-MCNC: 7 MG/DL (ref 6–20)
CALCIUM SERPL-MCNC: 8.9 MG/DL (ref 8.6–10.2)
CHLORIDE BLD-SCNC: 101 MMOL/L (ref 98–107)
CO2: 27 MMOL/L (ref 22–29)
CREAT SERPL-MCNC: 0.7 MG/DL (ref 0.5–1)
EOSINOPHILS ABSOLUTE: 0.11 E9/L (ref 0.05–0.5)
EOSINOPHILS RELATIVE PERCENT: 1.3 % (ref 0–6)
GFR SERPL CREATININE-BSD FRML MDRD: >60 ML/MIN/1.73
GLUCOSE BLD-MCNC: 106 MG/DL (ref 74–99)
HCT VFR BLD CALC: 38.3 % (ref 34–48)
HEMOGLOBIN: 12.9 G/DL (ref 11.5–15.5)
IMMATURE GRANULOCYTES #: 0.03 E9/L
IMMATURE GRANULOCYTES %: 0.3 % (ref 0–5)
LYMPHOCYTES ABSOLUTE: 4.57 E9/L (ref 1.5–4)
LYMPHOCYTES RELATIVE PERCENT: 52.6 % (ref 20–42)
MCH RBC QN AUTO: 27.4 PG (ref 26–35)
MCHC RBC AUTO-ENTMCNC: 33.7 % (ref 32–34.5)
MCV RBC AUTO: 81.5 FL (ref 80–99.9)
METER GLUCOSE: 107 MG/DL (ref 74–99)
METER GLUCOSE: 108 MG/DL (ref 74–99)
METER GLUCOSE: 108 MG/DL (ref 74–99)
METER GLUCOSE: 178 MG/DL (ref 74–99)
MONOCYTES ABSOLUTE: 0.77 E9/L (ref 0.1–0.95)
MONOCYTES RELATIVE PERCENT: 8.9 % (ref 2–12)
NEUTROPHILS ABSOLUTE: 3.17 E9/L (ref 1.8–7.3)
NEUTROPHILS RELATIVE PERCENT: 36.4 % (ref 43–80)
PDW BLD-RTO: 12.5 FL (ref 11.5–15)
PLATELET # BLD: 263 E9/L (ref 130–450)
PMV BLD AUTO: 9.5 FL (ref 7–12)
POTASSIUM SERPL-SCNC: 3.3 MMOL/L (ref 3.5–5)
RBC # BLD: 4.7 E12/L (ref 3.5–5.5)
SODIUM BLD-SCNC: 138 MMOL/L (ref 132–146)
WBC # BLD: 8.7 E9/L (ref 4.5–11.5)

## 2022-12-08 PROCEDURE — 6360000002 HC RX W HCPCS: Performed by: STUDENT IN AN ORGANIZED HEALTH CARE EDUCATION/TRAINING PROGRAM

## 2022-12-08 PROCEDURE — 1200000000 HC SEMI PRIVATE

## 2022-12-08 PROCEDURE — 36415 COLL VENOUS BLD VENIPUNCTURE: CPT

## 2022-12-08 PROCEDURE — 6370000000 HC RX 637 (ALT 250 FOR IP): Performed by: INTERNAL MEDICINE

## 2022-12-08 PROCEDURE — 85025 COMPLETE CBC W/AUTO DIFF WBC: CPT

## 2022-12-08 PROCEDURE — 6360000002 HC RX W HCPCS: Performed by: INTERNAL MEDICINE

## 2022-12-08 PROCEDURE — 96376 TX/PRO/DX INJ SAME DRUG ADON: CPT

## 2022-12-08 PROCEDURE — 2580000003 HC RX 258: Performed by: INTERNAL MEDICINE

## 2022-12-08 PROCEDURE — 80048 BASIC METABOLIC PNL TOTAL CA: CPT

## 2022-12-08 PROCEDURE — 99233 SBSQ HOSP IP/OBS HIGH 50: CPT | Performed by: INTERNAL MEDICINE

## 2022-12-08 PROCEDURE — 82962 GLUCOSE BLOOD TEST: CPT

## 2022-12-08 PROCEDURE — G0378 HOSPITAL OBSERVATION PER HR: HCPCS

## 2022-12-08 PROCEDURE — 6370000000 HC RX 637 (ALT 250 FOR IP): Performed by: STUDENT IN AN ORGANIZED HEALTH CARE EDUCATION/TRAINING PROGRAM

## 2022-12-08 RX ORDER — BUSPIRONE HYDROCHLORIDE 5 MG/1
5 TABLET ORAL 3 TIMES DAILY
Status: DISCONTINUED | OUTPATIENT
Start: 2022-12-08 | End: 2022-12-10 | Stop reason: HOSPADM

## 2022-12-08 RX ORDER — ESCITALOPRAM OXALATE 10 MG/1
10 TABLET ORAL DAILY
Status: DISCONTINUED | OUTPATIENT
Start: 2022-12-08 | End: 2022-12-10 | Stop reason: HOSPADM

## 2022-12-08 RX ORDER — POTASSIUM CHLORIDE 20 MEQ/1
40 TABLET, EXTENDED RELEASE ORAL ONCE
Status: COMPLETED | OUTPATIENT
Start: 2022-12-08 | End: 2022-12-08

## 2022-12-08 RX ADMIN — BUSPIRONE HYDROCHLORIDE 5 MG: 5 TABLET ORAL at 20:30

## 2022-12-08 RX ADMIN — HYDROMORPHONE HYDROCHLORIDE 0.25 MG: 1 INJECTION, SOLUTION INTRAMUSCULAR; INTRAVENOUS; SUBCUTANEOUS at 14:10

## 2022-12-08 RX ADMIN — DIPHENHYDRAMINE HYDROCHLORIDE 50 MG: 25 TABLET ORAL at 20:30

## 2022-12-08 RX ADMIN — FAMOTIDINE 20 MG: 20 TABLET, FILM COATED ORAL at 20:30

## 2022-12-08 RX ADMIN — OXYCODONE AND ACETAMINOPHEN 1 TABLET: 5; 325 TABLET ORAL at 16:35

## 2022-12-08 RX ADMIN — ESCITALOPRAM OXALATE 10 MG: 10 TABLET ORAL at 15:07

## 2022-12-08 RX ADMIN — SODIUM CHLORIDE, PRESERVATIVE FREE 10 ML: 5 INJECTION INTRAVENOUS at 14:11

## 2022-12-08 RX ADMIN — DIPHENHYDRAMINE HYDROCHLORIDE 50 MG: 25 TABLET ORAL at 11:34

## 2022-12-08 RX ADMIN — POTASSIUM CHLORIDE 40 MEQ: 1500 TABLET, EXTENDED RELEASE ORAL at 13:48

## 2022-12-08 RX ADMIN — HYDROMORPHONE HYDROCHLORIDE 0.25 MG: 1 INJECTION, SOLUTION INTRAMUSCULAR; INTRAVENOUS; SUBCUTANEOUS at 23:09

## 2022-12-08 RX ADMIN — OXYCODONE AND ACETAMINOPHEN 1 TABLET: 5; 325 TABLET ORAL at 08:31

## 2022-12-08 RX ADMIN — FAMOTIDINE 20 MG: 20 TABLET, FILM COATED ORAL at 08:31

## 2022-12-08 RX ADMIN — OXYCODONE AND ACETAMINOPHEN 1 TABLET: 5; 325 TABLET ORAL at 20:35

## 2022-12-08 RX ADMIN — ONDANSETRON 4 MG: 2 INJECTION INTRAMUSCULAR; INTRAVENOUS at 05:05

## 2022-12-08 RX ADMIN — BUSPIRONE HYDROCHLORIDE 5 MG: 5 TABLET ORAL at 15:07

## 2022-12-08 RX ADMIN — OXYCODONE AND ACETAMINOPHEN 1 TABLET: 5; 325 TABLET ORAL at 03:37

## 2022-12-08 RX ADMIN — SODIUM CHLORIDE, PRESERVATIVE FREE 10 ML: 5 INJECTION INTRAVENOUS at 20:30

## 2022-12-08 RX ADMIN — METHOCARBAMOL 750 MG: 500 TABLET ORAL at 18:47

## 2022-12-08 RX ADMIN — HYDROMORPHONE HYDROCHLORIDE 0.25 MG: 1 INJECTION, SOLUTION INTRAMUSCULAR; INTRAVENOUS; SUBCUTANEOUS at 00:02

## 2022-12-08 RX ADMIN — DIPHENHYDRAMINE HYDROCHLORIDE 50 MG: 25 TABLET ORAL at 00:05

## 2022-12-08 RX ADMIN — HYDROMORPHONE HYDROCHLORIDE 0.25 MG: 1 INJECTION, SOLUTION INTRAMUSCULAR; INTRAVENOUS; SUBCUTANEOUS at 04:33

## 2022-12-08 RX ADMIN — HYDROMORPHONE HYDROCHLORIDE 0.25 MG: 1 INJECTION, SOLUTION INTRAMUSCULAR; INTRAVENOUS; SUBCUTANEOUS at 09:28

## 2022-12-08 RX ADMIN — METHOCARBAMOL 750 MG: 500 TABLET ORAL at 04:32

## 2022-12-08 RX ADMIN — SODIUM CHLORIDE, PRESERVATIVE FREE 10 ML: 5 INJECTION INTRAVENOUS at 08:32

## 2022-12-08 RX ADMIN — HYDROMORPHONE HYDROCHLORIDE 0.25 MG: 1 INJECTION, SOLUTION INTRAMUSCULAR; INTRAVENOUS; SUBCUTANEOUS at 18:48

## 2022-12-08 RX ADMIN — ONDANSETRON 4 MG: 2 INJECTION INTRAMUSCULAR; INTRAVENOUS at 11:21

## 2022-12-08 RX ADMIN — ACETAMINOPHEN 650 MG: 325 TABLET ORAL at 11:28

## 2022-12-08 ASSESSMENT — PAIN DESCRIPTION - DESCRIPTORS
DESCRIPTORS: ACHING
DESCRIPTORS: ACHING;SHARP
DESCRIPTORS: ACHING
DESCRIPTORS: ACHING;SPASM
DESCRIPTORS: ACHING;SHARP
DESCRIPTORS: ACHING;SHARP
DESCRIPTORS: ACHING

## 2022-12-08 ASSESSMENT — PAIN DESCRIPTION - ORIENTATION
ORIENTATION: RIGHT;LEFT
ORIENTATION: RIGHT;LEFT
ORIENTATION: LEFT;RIGHT
ORIENTATION: RIGHT;LEFT
ORIENTATION: LEFT
ORIENTATION: RIGHT;LEFT
ORIENTATION: RIGHT

## 2022-12-08 ASSESSMENT — PAIN DESCRIPTION - LOCATION
LOCATION: LEG

## 2022-12-08 ASSESSMENT — PAIN SCALES - GENERAL
PAINLEVEL_OUTOF10: 8
PAINLEVEL_OUTOF10: 6
PAINLEVEL_OUTOF10: 8
PAINLEVEL_OUTOF10: 7
PAINLEVEL_OUTOF10: 5
PAINLEVEL_OUTOF10: 8
PAINLEVEL_OUTOF10: 5
PAINLEVEL_OUTOF10: 3
PAINLEVEL_OUTOF10: 9
PAINLEVEL_OUTOF10: 7
PAINLEVEL_OUTOF10: 5

## 2022-12-08 NOTE — PLAN OF CARE
Problem: Chronic Conditions and Co-morbidities  Goal: Patient's chronic conditions and co-morbidity symptoms are monitored and maintained or improved  12/7/2022 1950 by Julian Cleaning RN  Outcome: Progressing  12/7/2022 1055 by Marcela Wilson RN  Outcome: Progressing     Problem: Discharge Planning  Goal: Discharge to home or other facility with appropriate resources  12/7/2022 1950 by Julian Cleaning RN  Outcome: Progressing  12/7/2022 1055 by Marcela Wilson RN  Outcome: Progressing  12/7/2022 0638 by Milady Lopez RN  Outcome: Progressing     Problem: Pain  Goal: Verbalizes/displays adequate comfort level or baseline comfort level  12/7/2022 1950 by Julian Cleaning RN  Outcome: Progressing  12/7/2022 1055 by Marcela Wilson RN  Outcome: Progressing  12/7/2022 0638 by Milady Lopez RN  Outcome: Not Progressing     Problem: Safety - Adult  Goal: Free from fall injury  12/7/2022 1950 by Julian Cleaning RN  Outcome: Progressing  12/7/2022 0638 by Milady Lopez RN  Outcome: Progressing     Problem: ABCDS Injury Assessment  Goal: Absence of physical injury  12/7/2022 1950 by Julian Cleaning RN  Outcome: Progressing  12/7/2022 1055 by Marcela Wilson RN  Outcome: Progressing  12/7/2022 0638 by Milady Lopez RN  Outcome: Progressing     Problem: Pain  Goal: Verbalizes/displays adequate comfort level or baseline comfort level  12/7/2022 1950 by Julian Cleaning RN  Outcome: Progressing  12/7/2022 1055 by Marcela Wilson RN  Outcome: Progressing  12/7/2022 0638 by Milady Lopez RN  Outcome: Not Progressing

## 2022-12-08 NOTE — PROGRESS NOTES
Physical Therapy      Physical Therapy    Room #:   5431/3328-01    Date: 2022       Patient Name: Siobhan Yi  : 1975      MRN: [id number]     Patient unavailable for physical therapy treatment due to  per  podiatry resident note on today's date 22 a 12:07 pm. To hold physical and occupational therapy for 1 day to allow her to recover. Physical therapy will check back at a later time/date. Thank you.          April Ospina, PTA

## 2022-12-08 NOTE — PROGRESS NOTES
Attempted tx with pt, however ns states to wait for occupational therapy treatment, as pt currently agitated and  is with pt right now. Will attempt tx with pt at later time/date. Meghan Astudillo, Ana Lamar

## 2022-12-08 NOTE — PROGRESS NOTES
Podiatry Progress Note  2022   Trisha Clement       SUBJECTIVE: Hanane Wade is a 52 y.o. female who was admitted postoperatively 2022 for management of postoperative pain. Complaining of pain today after physical therapy. Will recommend holding physical and Occupational Therapy for 1 day to allow her to recover. No nausea/vomiting today. Denies any other constitutional symptoms today. Okay for DC from podiatry perspective with outpt followup. Past Medical History:   Diagnosis Date    Abnormal Pap smear 2012    Atypical glandular cells([facility])    Asthma     no attack since high school    Diabetes mellitus ([facility] [address]     type 2    Dizziness     Headache     Hypertension , ,    during pregnancy not on meds    Memory difficulties     Vertigo         Past Surgical History:   Procedure Laterality Date     SECTION      x 3    DILATION AND CURETTAGE  11    ENDOMETRIAL ABLATION  11    ENDOMETRIAL BIOPSY      FOOT TENDON SURGERY Right 2022    [facility]HL TENDON TRANSFER AND CALCANEAL OSTECTOMY OF RIGHT FOOT (CPT 01430) performed by Candy Aguilar DPM at [facility]  11    [facility]  2012    [address]    left salpingectomy for ectopic pregnancy    [place] TOTAL ABDOM HYSTERECTOMY N/A 2018    TOTAL ABDOMINAL HYSTERECTOMY  AND REMOVAL OF RIGHT FALLOPIAN TUBE performed by Hussein Jett MD at [address] [place] [address]           Family History   Problem Relation Age of Onset    Cancer Father         prostate ca    Hypertension Father     Lupus Mother     Hypertension Mother     Diabetes Brother     Sickle Cell Anemia Daughter     Sickle Cell Trait Daughter     Breast Cancer Maternal Aunt         Social History     Tobacco Use    Smoking status: Never    Smokeless tobacco: Never    Tobacco comments:     only smokes marijuana   Substance Use Topics    Alcohol use:  Yes     Alcohol/week: 1.0 standard drink     Types: 1 Glasses of wine per week     Comment: social        Prior to Admission medications    Medication Sig Start Date End Date Taking? Authorizing Provider   doxycycline hyclate (VIBRA-TABS) 100 MG tablet Take 1 tablet by mouth 2 times daily for 10 days 12/5/22 12/15/22 Yes Alden X Brunam, DPM   HYDROcodone-acetaminophen (NORCO) 5-325 MG per tablet Take 1 tablet by mouth every 4 hours as needed for Pain for up to 7 days. Intended supply: 7 days. Take lowest dose possible to manage pain 12/5/22 12/12/22 Yes Alden X Fahim, DPM   ibuprofen (IBU) 600 MG tablet Take 1 tablet by mouth every 8 hours as needed for Pain 6/28/21 7/8/21  Ricardo Paul MD        Aspirin         OBJECTIVE:        Vitals:    12/08/22 0552   BP: (!) 152/87   Pulse: 76   Resp: 16   Temp: 98.1 °F (36.7 °C)   SpO2: 100%          EXAM:        Pt is AAOx3, NAD. Preoperative physical exam posterior splint in place CDI right lower extremity with dressing CDI left lower extremity. Vascular Exam: Palpable DP and PT pulses bilaterally. Brisk capillary fill time all pedal digits bilaterally. Warm to warm skin temperature proximal lower leg to distal digits bilaterally. Neuro Exam: Epicritic sensation intact    Dermatologic Exam:    -Full-thickness postoperative incisional skin well coapted and sutures in place bilateral posterior Achilles. -Diffuse xerosis bilateral heel. No open wounds or lesions. Pedal hair growth noted. Nails well trimmed and intact. MSK: Muscle strength 4/5 right plantar flexion with pain along the Achilles tendon insertion. Postoperatively patient is in dressing active but painful range of motion bilateral plantarflexion dorsiflexion plantar able to passively dorsiflex plantarflex all digits bilateral.  Ankle joint range of motion intact subtalar joint range of motion intact. .    Current Facility-Administered Medications   Medication Dose Route Frequency Provider Last Rate Last Admin    potassium chloride (KLOR-CON M) extended release tablet 40 mEq  40 mEq Oral Once Heladio Cervantes MD        HYDROmorphone HCl PF (DILAUDID) injection 0.25 mg  0.25 mg IntraVENous Q3H PRN Beatrice De Anda MD   0.25 mg at 12/08/22 0928    methocarbamol (ROBAXIN) tablet 750 mg  750 mg Oral Q8H PRN Beatrice De Anda MD   750 mg at 12/08/22 0432    insulin lispro (HUMALOG) injection vial 0-4 Units  0-4 Units SubCUTAneous TID WC Beatrice De Anda MD        insulin lispro (HUMALOG) injection vial 0-4 Units  0-4 Units SubCUTAneous Nightly Beatrice De Anda MD        glucose chewable tablet 16 g  4 tablet Oral PRN Beatrice De Anda MD        dextrose bolus 10% 125 mL  125 mL IntraVENous PRN Beatrice De Anda MD        Or    dextrose bolus 10% 250 mL  250 mL IntraVENous PRN Beatrice De Anda MD        glucagon (rDNA) injection 1 mg  1 mg SubCUTAneous PRN Beatrice De Anda MD        dextrose 10 % infusion   IntraVENous Continuous PRN Beatrice De Anda MD        oxyCODONE-acetaminophen (PERCOCET) 5-325 MG per tablet 1 tablet  1 tablet Oral Q4H PRN Noble Finn MD   1 tablet at 12/08/22 0831    famotidine (PEPCID) tablet 20 mg  20 mg Oral BID Beatrice De Anda MD   20 mg at 12/08/22 0831    diphenhydrAMINE (BENADRYL) tablet 50 mg  50 mg Oral Q8H PRN Noble Finn MD   50 mg at 12/08/22 1134    sodium chloride flush 0.9 % injection 10 mL  10 mL IntraVENous 2 times per day Noble Finn MD   10 mL at 12/08/22 0832    sodium chloride flush 0.9 % injection 10 mL  10 mL IntraVENous PRN Noble Finn MD        0.9 % sodium chloride infusion   IntraVENous PRN Noble Finn MD        enoxaparin (LOVENOX) injection 40 mg  40 mg SubCUTAneous Daily Noble Finn MD        promethazine (PHENERGAN) tablet 12.5 mg  12.5 mg Oral Q6H PRN Noble Finn MD   12.5 mg at 12/07/22 0810    Or    ondansetron (ZOFRAN) injection 4 mg  4 mg IntraVENous Q6H PRN Noble Finn MD   4 mg at 12/08/22 1121    polyethylene glycol (GLYCOLAX) packet 17 g  17 g Oral Daily PRN Romero Castellon MD   17 g at 12/07/22 1300    acetaminophen (TYLENOL) tablet 650 mg  650 mg Oral Q6H PRN Romero Castellon MD   650 mg at 12/08/22 1128    Or    acetaminophen (TYLENOL) suppository 650 mg  650 mg Rectal Q6H PRN Romero Castellon MD            Lab Results   Component Value Date    WBC 8.7 12/08/2022    HCT 38.3 12/08/2022    HGB 12.9 12/08/2022     12/08/2022     12/08/2022    K 3.3 (L) 12/08/2022     12/08/2022    CO2 27 12/08/2022    BUN 7 12/08/2022    CREATININE 0.7 12/08/2022    GLUCOSE 106 (H) 12/08/2022     ASSESSMENT:  -S/p right Achilles tenotomy, FHL transfer, calcaneal osteotomy (DOS 12/5/2022)  -Admitted for postoperative pain  -Achilles tendinosis right with degenerative tear  -History of cocaine abuse    PLAN:  - Examined and evaluated  - All labs, imaging, and charts reviewed   - WBC: 8.7  -Splint CDI with dressings right lower extremity. Dressing CDI left lower extremity. All dressings can remain intact until first postoperative visit. - Dressings: Xeroform DSD Left foot and ankle. Connolly Milton DSD posterior splint RLE.   -Nonweightbearing right lower extremity at all times. Weightbearing as tolerated left lower extremity surgical shoe.  -Follow-up with Dr. Edson Gramajo in the outpatient setting within 1 week of discharge  -Appreciate all pain management and antibiotic input.  -Cleared for DC from podiatry perspective as long as medically cleared and pain is manageable    D/W: Dank Stern DPM FACFAS  Fellowship-Trained Foot and Ankle Surgeon  Diplomate, American Board of Foot and Ankle Surgeons  [phone]     Thank you for involving podiatry in this patients care. Please do not hesitate to call with any questions or concerns.      Omar Houston Podiatry PGY3  12/8/2022   12:07 PM

## 2022-12-08 NOTE — PROGRESS NOTES
[address] [place], [place] [address]   Progress Note    Admitting Date and Time: 12/5/2022 10:45 AM  Admit Dx: Tendonitis, Achilles, right [M76.61]  Nontraumatic tear of tibialis posterior tendon, right [M66.871]  Right foot pain [M79.671]  Postoperative pain [G89.18]  Intractable pain [R52]    Subjective:    12/7: Pt feels like she is still having a lot of pain in the R foot. She also is complaining of muscle spasms that are very bothersome. She states she picked a facility for [facility]. 12/8: Pt sitting up in bed. States she feels she need something for anxiety. Today, she got upset with nurse in the room and had a violent outburst and that is not like her. Per RN: asking for pain medication regularly. Patient feels pain is even worse today.        insulin lispro  0-4 Units SubCUTAneous TID WC    insulin lispro  0-4 Units SubCUTAneous Nightly    famotidine  20 mg Oral BID    sodium chloride flush  10 mL IntraVENous 2 times per day    enoxaparin  40 mg SubCUTAneous Daily     HYDROmorphone, 0.25 mg, Q3H PRN  methocarbamol, 750 mg, Q8H PRN  glucose, 4 tablet, PRN  dextrose bolus, 125 mL, PRN   Or  dextrose bolus, 250 mL, PRN  glucagon (rDNA), 1 mg, PRN  dextrose, , Continuous PRN  oxyCODONE-acetaminophen, 1 tablet, Q4H PRN  diphenhydrAMINE, 50 mg, Q8H PRN  sodium chloride flush, 10 mL, PRN  sodium chloride, , PRN  promethazine, 12.5 mg, Q6H PRN   Or  ondansetron, 4 mg, Q6H PRN  polyethylene glycol, 17 g, Daily PRN  acetaminophen, 650 mg, Q6H PRN   Or  acetaminophen, 650 mg, Q6H PRN       Objective:    BP (!) 152/87   Pulse 76   Temp 98.1 °F (36.7 °C) (Oral)   Resp 16   Ht 5' 5\" (1.651 m)   Wt 190 lb (86.2 kg)   LMP 07/16/2018 Comment: always irregular  SpO2 100%   BMI 31.62 kg/m²   General Appearance: alert and oriented to person, place and time and in no acute distress  Skin: warm and dry  Head: normocephalic and atraumatic  Eyes: pupils equal, round, and reactive to light, extraocular eye movements intact, conjunctivae normal  Neck: neck supple and non tender without mass   Pulmonary/Chest: clear to auscultation bilaterally- no wheezes, rales or rhonchi, normal air movement, no respiratory distress  Cardiovascular: normal rate, normal S1 and S2 and no carotid bruits  Abdomen: soft, non-tender, non-distended, normal bowel sounds, no masses or organomegaly  Extremities: no cyanosis, no clubbing. Dressings on b/l feet, appear C/D/I  Neurologic: no cranial nerve deficit and speech normal      Recent Labs     12/06/22 0414 12/07/22  0746 12/08/22  0526    137 138   K 3.8 3.5 3.3*    102 101   CO2 22 24 27   BUN 7 8 7   CREATININE 0.5 0.7 0.7   GLUCOSE 148* 116* 106*   CALCIUM 8.5* 8.7 8.9         Recent Labs     12/06/22 0414   ALKPHOS 67   PROT 6.9   LABALBU 4.0   BILITOT 0.4   AST 16   ALT 16         Recent Labs     12/06/22 0414 12/07/22  0746 12/08/22  0526   WBC 16.6* 12.6* 8.7   RBC 4.73 4.69 4.70   HGB 12.6 12.7 12.9   HCT 38.0 37.6 38.3   MCV 80.3 80.2 81.5   MCH 26.6 27.1 27.4   MCHC 33.2 33.8 33.7   RDW 12.1 12.6 12.5    268 263   MPV 9.6 9.2 9.5           Radiology:   No orders to display       Assessment:  Principal Problem:    Postoperative pain  Active Problems:    Intractable pain  Resolved Problems:    * No resolved hospital problems. *      Plan:      Right Achilles tendinitis with degenerative tear   -POD #3 with podiatry which involve flexor hallux longus tendon transfer to the right ankle and calcaneal ostectomy. Pain was too severe to go home so was admitted  -Currently on oxycodone, dilaudid and robaxin. Patient complaining mostly of muscle spasms so Robaxin was increased  and decrease dilaudid.  -Worked with PT/OTwho are recommending [facility] vs [facility], patient seems to prefer [facility] and has been accepted at [facility] 69 of care per podiatry      Neutrophilic leukocytosis  -WBC 16.6 postop. Likely reactive.  Trending down 16.6-->12.6-->8.7   -Patient afebrile       Type II DM  -Most recent A1C 4/2021 was 6.1, now is 6.6   -Started on ISS, should start metformin at discharge or on FU with PCP. 4. Anxiety/situational stress  -We will start patient on Lexapro 10 mg daily but since its can take a while to kick in we will add BuSpar 5 mg 3 times daily. Trying to avoid benzodiazepines given that she is already using significant amount of pain medication. 5. Disposition  -Patient to go to [place]. Awaiting pre-CERT      NOTE: This report was transcribed using voice recognition software. Every effort was made to ensure accuracy; however, inadvertent computerized transcription errors may be present.      Electronically signed by Christi Avina MD on 12/8/2022 at 2:39 PM

## 2022-12-08 NOTE — CARE COORDINATION
CM note: follow up contact made to [facility] liaison requesting update on patient's acceptance status, awaiting return call. For transfer to [facility] patient will NEED PRE-CERT, a neg covid test, a PASSR and MIRZA completed.

## 2022-12-08 NOTE — CARE COORDINATION
CM note: pt accepted at [facility], will need to sign drug policy. Abdias to submit for pre-cert today. For transfer to [facility] patient will NEED PRE-CERT, a neg covid test and a MIRZA completed. PASSR done.

## 2022-12-08 NOTE — PLAN OF CARE
Problem: Chronic Conditions and Co-morbidities  Goal: Patient's chronic conditions and co-morbidity symptoms are monitored and maintained or improved  12/7/2022 1950 by Uziel Robins RN  Outcome: Progressing  12/7/2022 1055 by Lindon Baumgarten, RN  Outcome: Progressing     Problem: Discharge Planning  Goal: Discharge to home or other facility with appropriate resources  12/7/2022 1950 by Uziel Robins RN  Outcome: Progressing  12/7/2022 1055 by Lindon Baumgarten, RN  Outcome: Progressing  12/7/2022 0638 by Marycruz Amezquita RN  Outcome: Progressing     Problem: Pain  Goal: Verbalizes/displays adequate comfort level or baseline comfort level  12/7/2022 1950 by Uziel Robins RN  Outcome: Progressing  12/7/2022 1055 by Lindon Baumgarten, RN  Outcome: Progressing  12/7/2022 0638 by Marycruz Amezquita RN  Outcome: Not Progressing     Problem: Safety - Adult  Goal: Free from fall injury  12/7/2022 1950 by Uziel Robins RN  Outcome: Progressing  12/7/2022 0638 by Marycruz Amezquita RN  Outcome: Progressing     Problem: ABCDS Injury Assessment  Goal: Absence of physical injury  12/7/2022 1950 by Uziel Robins RN  Outcome: Progressing  12/7/2022 1055 by Lindon Baumgarten, RN  Outcome: Progressing  12/7/2022 0638 by Marycruz Amezquita RN  Outcome: Progressing     Problem: Pain  Goal: Verbalizes/displays adequate comfort level or baseline comfort level  12/7/2022 1950 by Uziel Robins RN  Outcome: Progressing  12/7/2022 1055 by Lindon Baumgarten, RN  Outcome: Progressing  12/7/2022 0638 by Marycruz Amezquita RN  Outcome: Not Progressing

## 2022-12-08 NOTE — PROGRESS NOTES
Per podiatry resident note on today's date 12/8/22 at 12:07 PM, resident recommended   holding physical and Occupational Therapy for 1 day to allow her to recover 2* to postoperative pain. Will attempt tx with pt at later time/date.  Monet Peres, [address]

## 2022-12-09 LAB
ANION GAP SERPL CALCULATED.3IONS-SCNC: 10 MMOL/L (ref 7–16)
BASOPHILS ABSOLUTE: 0.04 E9/L (ref 0–0.2)
BASOPHILS RELATIVE PERCENT: 0.5 % (ref 0–2)
BUN BLDV-MCNC: 7 MG/DL (ref 6–20)
CALCIUM SERPL-MCNC: 8.6 MG/DL (ref 8.6–10.2)
CHLORIDE BLD-SCNC: 102 MMOL/L (ref 98–107)
CO2: 27 MMOL/L (ref 22–29)
CREAT SERPL-MCNC: 0.7 MG/DL (ref 0.5–1)
EOSINOPHILS ABSOLUTE: 0.15 E9/L (ref 0.05–0.5)
EOSINOPHILS RELATIVE PERCENT: 1.9 % (ref 0–6)
GFR SERPL CREATININE-BSD FRML MDRD: >60 ML/MIN/1.73
GLUCOSE BLD-MCNC: 162 MG/DL (ref 74–99)
HCT VFR BLD CALC: 38.5 % (ref 34–48)
HEMOGLOBIN: 12.7 G/DL (ref 11.5–15.5)
IMMATURE GRANULOCYTES #: 0.04 E9/L
IMMATURE GRANULOCYTES %: 0.5 % (ref 0–5)
LYMPHOCYTES ABSOLUTE: 4.09 E9/L (ref 1.5–4)
LYMPHOCYTES RELATIVE PERCENT: 52.1 % (ref 20–42)
MCH RBC QN AUTO: 27 PG (ref 26–35)
MCHC RBC AUTO-ENTMCNC: 33 % (ref 32–34.5)
MCV RBC AUTO: 81.9 FL (ref 80–99.9)
METER GLUCOSE: 149 MG/DL (ref 74–99)
METER GLUCOSE: 162 MG/DL (ref 74–99)
MONOCYTES ABSOLUTE: 0.73 E9/L (ref 0.1–0.95)
MONOCYTES RELATIVE PERCENT: 9.3 % (ref 2–12)
NEUTROPHILS ABSOLUTE: 2.8 E9/L (ref 1.8–7.3)
NEUTROPHILS RELATIVE PERCENT: 35.7 % (ref 43–80)
PDW BLD-RTO: 12.3 FL (ref 11.5–15)
PLATELET # BLD: 254 E9/L (ref 130–450)
PMV BLD AUTO: 9.5 FL (ref 7–12)
POTASSIUM SERPL-SCNC: 3.9 MMOL/L (ref 3.5–5)
RBC # BLD: 4.7 E12/L (ref 3.5–5.5)
SODIUM BLD-SCNC: 139 MMOL/L (ref 132–146)
WBC # BLD: 7.9 E9/L (ref 4.5–11.5)

## 2022-12-09 PROCEDURE — 85025 COMPLETE CBC W/AUTO DIFF WBC: CPT

## 2022-12-09 PROCEDURE — 6370000000 HC RX 637 (ALT 250 FOR IP): Performed by: INTERNAL MEDICINE

## 2022-12-09 PROCEDURE — 6360000002 HC RX W HCPCS: Performed by: STUDENT IN AN ORGANIZED HEALTH CARE EDUCATION/TRAINING PROGRAM

## 2022-12-09 PROCEDURE — 6360000002 HC RX W HCPCS: Performed by: INTERNAL MEDICINE

## 2022-12-09 PROCEDURE — 99232 SBSQ HOSP IP/OBS MODERATE 35: CPT | Performed by: INTERNAL MEDICINE

## 2022-12-09 PROCEDURE — 80048 BASIC METABOLIC PNL TOTAL CA: CPT

## 2022-12-09 PROCEDURE — 6370000000 HC RX 637 (ALT 250 FOR IP): Performed by: STUDENT IN AN ORGANIZED HEALTH CARE EDUCATION/TRAINING PROGRAM

## 2022-12-09 PROCEDURE — 82962 GLUCOSE BLOOD TEST: CPT

## 2022-12-09 PROCEDURE — 2580000003 HC RX 258: Performed by: INTERNAL MEDICINE

## 2022-12-09 PROCEDURE — 36415 COLL VENOUS BLD VENIPUNCTURE: CPT

## 2022-12-09 PROCEDURE — 1200000000 HC SEMI PRIVATE

## 2022-12-09 RX ORDER — METHOCARBAMOL 750 MG/1
750 TABLET, FILM COATED ORAL EVERY 8 HOURS PRN
DISCHARGE
Start: 2022-12-09 | End: 2022-12-19

## 2022-12-09 RX ORDER — BUSPIRONE HYDROCHLORIDE 5 MG/1
5 TABLET ORAL 3 TIMES DAILY
DISCHARGE
Start: 2022-12-09

## 2022-12-09 RX ORDER — ESCITALOPRAM OXALATE 10 MG/1
10 TABLET ORAL DAILY
Qty: 30 TABLET | Refills: 3 | DISCHARGE
Start: 2022-12-10

## 2022-12-09 RX ORDER — POLYETHYLENE GLYCOL 3350 17 G/17G
17 POWDER, FOR SOLUTION ORAL DAILY PRN
Qty: 527 G | Refills: 1 | DISCHARGE
Start: 2022-12-09 | End: 2023-01-08

## 2022-12-09 RX ORDER — FAMOTIDINE 20 MG/1
20 TABLET, FILM COATED ORAL 2 TIMES DAILY
Qty: 60 TABLET | Refills: 3 | DISCHARGE
Start: 2022-12-09

## 2022-12-09 RX ORDER — DOCUSATE SODIUM 100 MG/1
100 CAPSULE, LIQUID FILLED ORAL 2 TIMES DAILY
Status: DISCONTINUED | OUTPATIENT
Start: 2022-12-10 | End: 2022-12-10 | Stop reason: HOSPADM

## 2022-12-09 RX ORDER — DOCUSATE SODIUM 100 MG/1
200 CAPSULE, LIQUID FILLED ORAL ONCE
Status: COMPLETED | OUTPATIENT
Start: 2022-12-09 | End: 2022-12-09

## 2022-12-09 RX ORDER — PSEUDOEPHEDRINE HCL 30 MG
100 TABLET ORAL 2 TIMES DAILY
DISCHARGE
Start: 2022-12-10

## 2022-12-09 RX ORDER — PROMETHAZINE HYDROCHLORIDE 12.5 MG/1
12.5 TABLET ORAL EVERY 6 HOURS PRN
DISCHARGE
Start: 2022-12-09 | End: 2022-12-16

## 2022-12-09 RX ADMIN — FAMOTIDINE 20 MG: 20 TABLET, FILM COATED ORAL at 08:26

## 2022-12-09 RX ADMIN — FAMOTIDINE 20 MG: 20 TABLET, FILM COATED ORAL at 20:31

## 2022-12-09 RX ADMIN — BUSPIRONE HYDROCHLORIDE 5 MG: 5 TABLET ORAL at 15:33

## 2022-12-09 RX ADMIN — SODIUM CHLORIDE, PRESERVATIVE FREE 10 ML: 5 INJECTION INTRAVENOUS at 08:27

## 2022-12-09 RX ADMIN — OXYCODONE AND ACETAMINOPHEN 1 TABLET: 5; 325 TABLET ORAL at 06:14

## 2022-12-09 RX ADMIN — OXYCODONE AND ACETAMINOPHEN 1 TABLET: 5; 325 TABLET ORAL at 11:02

## 2022-12-09 RX ADMIN — BUSPIRONE HYDROCHLORIDE 5 MG: 5 TABLET ORAL at 20:30

## 2022-12-09 RX ADMIN — SODIUM CHLORIDE, PRESERVATIVE FREE 10 ML: 5 INJECTION INTRAVENOUS at 20:35

## 2022-12-09 RX ADMIN — HYDROMORPHONE HYDROCHLORIDE 0.25 MG: 1 INJECTION, SOLUTION INTRAMUSCULAR; INTRAVENOUS; SUBCUTANEOUS at 20:32

## 2022-12-09 RX ADMIN — ONDANSETRON 4 MG: 2 INJECTION INTRAMUSCULAR; INTRAVENOUS at 08:35

## 2022-12-09 RX ADMIN — POLYETHYLENE GLYCOL 3350 17 G: 17 POWDER, FOR SOLUTION ORAL at 17:06

## 2022-12-09 RX ADMIN — OXYCODONE AND ACETAMINOPHEN 1 TABLET: 5; 325 TABLET ORAL at 22:34

## 2022-12-09 RX ADMIN — METHOCARBAMOL 750 MG: 500 TABLET ORAL at 15:38

## 2022-12-09 RX ADMIN — BUSPIRONE HYDROCHLORIDE 5 MG: 5 TABLET ORAL at 08:26

## 2022-12-09 RX ADMIN — HYDROMORPHONE HYDROCHLORIDE 0.25 MG: 1 INJECTION, SOLUTION INTRAMUSCULAR; INTRAVENOUS; SUBCUTANEOUS at 13:53

## 2022-12-09 RX ADMIN — DOCUSATE SODIUM 200 MG: 100 CAPSULE, LIQUID FILLED ORAL at 20:31

## 2022-12-09 RX ADMIN — HYDROMORPHONE HYDROCHLORIDE 0.25 MG: 1 INJECTION, SOLUTION INTRAMUSCULAR; INTRAVENOUS; SUBCUTANEOUS at 02:11

## 2022-12-09 RX ADMIN — ESCITALOPRAM OXALATE 10 MG: 10 TABLET ORAL at 08:26

## 2022-12-09 RX ADMIN — OXYCODONE AND ACETAMINOPHEN 1 TABLET: 5; 325 TABLET ORAL at 01:02

## 2022-12-09 RX ADMIN — METHOCARBAMOL 750 MG: 500 TABLET ORAL at 06:14

## 2022-12-09 ASSESSMENT — PAIN SCALES - GENERAL
PAINLEVEL_OUTOF10: 7
PAINLEVEL_OUTOF10: 8
PAINLEVEL_OUTOF10: 7
PAINLEVEL_OUTOF10: 2
PAINLEVEL_OUTOF10: 4
PAINLEVEL_OUTOF10: 2
PAINLEVEL_OUTOF10: 8

## 2022-12-09 ASSESSMENT — PAIN DESCRIPTION - DESCRIPTORS
DESCRIPTORS: ACHING;SHARP
DESCRIPTORS: SHARP;ACHING;CRAMPING
DESCRIPTORS: ACHING;SHARP
DESCRIPTORS: ACHING;NUMBNESS

## 2022-12-09 ASSESSMENT — PAIN DESCRIPTION - ORIENTATION
ORIENTATION: LEFT
ORIENTATION: RIGHT
ORIENTATION: LEFT;RIGHT
ORIENTATION: LEFT;RIGHT

## 2022-12-09 ASSESSMENT — PAIN DESCRIPTION - LOCATION
LOCATION: LEG

## 2022-12-09 NOTE — PROGRESS NOTES
Podiatry Progress Note  2022   Rubén Trey Clement       SUBJECTIVE: Maciel Joseph is a 52 y.o. female who was admitted postoperatively 2022 for management of postoperative pain. States she feels much better this morning with no pain. Appreciate all PT OT evaluation and treatment. Patient okay to resume therapy treatment tomorrow. Denies any other constitutional symptoms today. Okay for DC from podiatry perspective with outpt followup. Past Medical History:   Diagnosis Date    Abnormal Pap smear 2012    Atypical glandular cells([facility])    Asthma     no attack since high school    Diabetes mellitus ([facility]     type 2    Dizziness     Headache     Hypertension , ,    during pregnancy not on meds    Memory difficulties     Vertigo         Past Surgical History:   Procedure Laterality Date     SECTION      x 3    DILATION AND CURETTAGE  11    ENDOMETRIAL ABLATION  11    ENDOMETRIAL BIOPSY      FOOT TENDON SURGERY Right 2022    FHL TENDON TRANSFER AND CALCANEAL OSTECTOMY OF RIGHT FOOT (CPT 95991) performed by Loc David DPM at [facility]  11    [facility]  2012    [address]    left salpingectomy for ectopic pregnancy    MD TOTAL ABDOM HYSTERECTOMY N/A 2018    TOTAL ABDOMINAL HYSTERECTOMY  AND REMOVAL OF RIGHT FALLOPIAN TUBE performed by Brissa Segovia MD at [address] [place] [address]           Family History   Problem Relation Age of Onset    Cancer Father         prostate ca    Hypertension Father     Lupus Mother     Hypertension Mother     Diabetes Brother     Sickle Cell Anemia Daughter     Sickle Cell Trait Daughter     Breast Cancer Maternal Aunt         Social History     Tobacco Use    Smoking status: Never    Smokeless tobacco: Never    Tobacco comments:     only smokes marijuana   Substance Use Topics    Alcohol use:  Yes     Alcohol/week: 1.0 standard drink     Types: 1 Glasses of wine per week Comment: social        Prior to Admission medications    Medication Sig Start Date End Date Taking? Authorizing Provider   doxycycline hyclate (VIBRA-TABS) 100 MG tablet Take 1 tablet by mouth 2 times daily for 10 days 12/5/22 12/15/22 Yes Alden X Brunam, DPM   HYDROcodone-acetaminophen (NORCO) 5-325 MG per tablet Take 1 tablet by mouth every 4 hours as needed for Pain for up to 7 days. Intended supply: 7 days. Take lowest dose possible to manage pain 12/5/22 12/12/22 Yes Alden X Fahim, DPM   ibuprofen (IBU) 600 MG tablet Take 1 tablet by mouth every 8 hours as needed for Pain 6/28/21 7/8/21  Ricardo Childs MD        Aspirin         OBJECTIVE:        Vitals:    12/09/22 0557   BP: 134/87   Pulse: 91   Resp: 18   Temp: 98.8 °F (37.1 °C)   SpO2: 99%          EXAM:        Pt is AAOx3, NAD. Preoperative physical exam posterior splint in place CDI right lower extremity with dressing CDI left lower extremity. Vascular Exam: Palpable DP and PT pulses bilaterally. Brisk capillary fill time all pedal digits bilaterally. Warm to warm skin temperature proximal lower leg to distal digits bilaterally. Neuro Exam: Epicritic sensation intact    Dermatologic Exam:    -Full-thickness postoperative incisional skin well coapted and sutures in place bilateral posterior Achilles. -Diffuse xerosis bilateral heel. No open wounds or lesions. Pedal hair growth noted. Nails well trimmed and intact. MSK: Muscle strength 4/5 right plantar flexion with pain along the Achilles tendon insertion. Postoperatively patient is in dressing active but painful range of motion bilateral plantarflexion dorsiflexion plantar able to passively dorsiflex plantarflex all digits bilateral.  Ankle joint range of motion intact subtalar joint range of motion intact. .    Current Facility-Administered Medications   Medication Dose Route Frequency Provider Last Rate Last Admin    busPIRone (BUSPAR) tablet 5 mg  5 mg Oral TID Anne Dietrich MD 5 mg at 12/08/22 2030    escitalopram (LEXAPRO) tablet 10 mg  10 mg Oral Daily Hafsa Dinh MD   10 mg at 12/08/22 1507    HYDROmorphone HCl PF (DILAUDID) injection 0.25 mg  0.25 mg IntraVENous Q3H PRN Bernice Leavitt MD   0.25 mg at 12/09/22 0211    methocarbamol (ROBAXIN) tablet 750 mg  750 mg Oral Q8H PRN Bernice Leavitt MD   750 mg at 12/09/22 5479    insulin lispro (HUMALOG) injection vial 0-4 Units  0-4 Units SubCUTAneous TID WC Bernice Leavitt MD        insulin lispro (HUMALOG) injection vial 0-4 Units  0-4 Units SubCUTAneous Nightly Bernice Leavitt MD        glucose chewable tablet 16 g  4 tablet Oral PRN Bernice Leavitt MD        dextrose bolus 10% 125 mL  125 mL IntraVENous PRN Bernice Leavitt MD        Or    dextrose bolus 10% 250 mL  250 mL IntraVENous PRN Bernice Leavitt MD        glucagon (rDNA) injection 1 mg  1 mg SubCUTAneous PRN Bernice Leavitt MD        dextrose 10 % infusion   IntraVENous Continuous PRN Bernice Leavitt MD        oxyCODONE-acetaminophen (PERCOCET) 5-325 MG per tablet 1 tablet  1 tablet Oral Q4H PRN Doroteo Azevedo MD   1 tablet at 12/09/22 0614    famotidine (PEPCID) tablet 20 mg  20 mg Oral BID Bernice Leavitt MD   20 mg at 12/08/22 2030    diphenhydrAMINE (BENADRYL) tablet 50 mg  50 mg Oral Q8H PRN Doroteo Azevedo MD   50 mg at 12/08/22 2030    sodium chloride flush 0.9 % injection 10 mL  10 mL IntraVENous 2 times per day Doroteo Azevedo MD   10 mL at 12/08/22 2030    sodium chloride flush 0.9 % injection 10 mL  10 mL IntraVENous PRN Doroteo Azevedo MD   10 mL at 12/08/22 1411    0.9 % sodium chloride infusion   IntraVENous PRN Doroteo Azevedo MD        enoxaparin (LOVENOX) injection 40 mg  40 mg SubCUTAneous Daily Doroteo Azevedo MD        promethazine (PHENERGAN) tablet 12.5 mg  12.5 mg Oral Q6H PRN Doroteo Azevedo MD   12.5 mg at 12/07/22 0810    Or    ondansetron (ZOFRAN) injection 4 mg  4 mg IntraVENous Q6H PRN Doroteo Azevedo MD   4 mg at 12/08/22 1121    polyethylene glycol (GLYCOLAX) packet 17 g  17 g Oral Daily PRN Сергей Ovalle MD   17 g at 12/07/22 1300    acetaminophen (TYLENOL) tablet 650 mg  650 mg Oral Q6H PRN Сергей Ovalle MD   650 mg at 12/08/22 1128    Or    acetaminophen (TYLENOL) suppository 650 mg  650 mg Rectal Q6H PRN Сергей Ovalle MD            Lab Results   Component Value Date    WBC 7.9 12/09/2022    HCT 38.5 12/09/2022    HGB 12.7 12/09/2022     12/09/2022     12/09/2022    K 3.9 12/09/2022     12/09/2022    CO2 27 12/09/2022    BUN 7 12/09/2022    CREATININE 0.7 12/09/2022    GLUCOSE 162 (H) 12/09/2022     ASSESSMENT:  -S/p right Achilles tenotomy, FHL transfer, calcaneal osteotomy (DOS 12/5/2022)  -Admitted for postoperative pain  -Achilles tendinosis right with degenerative tear  -History of cocaine abuse    PLAN:  - Examined and evaluated  - All labs, imaging, and charts reviewed   - WBC: 7.9  -Splint CDI with dressings right lower extremity. Dressing CDI left lower extremity. All dressings can remain intact until first postoperative visit. - Dressings: Xeroform DSD Left foot and ankle. Marques Kast DSD posterior splint RLE.   -Nonweightbearing right lower extremity at all times. Weightbearing as tolerated left lower extremity surgical shoe.  -Follow-up with Dr. Hermila Wilkins in the outpatient setting within 1 week of discharge  -Appreciate all pain management and antibiotic input.  -Cleared for DC from podiatry perspective as long as medically cleared and pain is manageable    D/W: Sultana Oglesby DPM FACFAS  Fellowship-Trained Foot and Ankle Surgeon  Diplomate, American Board of Foot and Ankle Surgeons  [phone]     Thank you for involving podiatry in this patients care. Please do not hesitate to call with any questions or concerns.      Bevely Payment Podiatry PGY3  12/9/2022   7:44 AM

## 2022-12-09 NOTE — CARE COORDINATION
Patient requesting to speak with GARTH.  Met with her in room. She is up in chair at side of bed. She states she spoke with  yesterday and had agreed to [facility] and had signed the agreement to go there, however now today she has decided against it. At one point she requested [facility], but when GARTH explained that [facility] would not be an option, she states \"well can I go home and have therapy at home? \"  [facility] next start of care is next Friday, [place] unable to accept due to insurance. Referral to [facility], awaiting response.

## 2022-12-09 NOTE — CARE COORDINATION
Auth obtained for [facility]. Patient now agreeable to going to rehab at [facility]. Per Isai Matthews tomorrow am.  Have arranged transport via [facility] at [facility] [place] tomorrow am 12/10/22. PASSRR is done. MIRZA needs signed.

## 2022-12-09 NOTE — ADT AUTH CERT
Utilization Reviews       PA Recommends IP by Winnie Valdes LPN       Review Status Review Entered   In Primary 2022 1138       Created By   Winnie Valdes LPN      Criteria Review   We recommend that the following pt's current hospitalization under OBSERVATION status is upgraded to INPATIENT; if you agree, please place a new ADMIT order in [facility] as recommended. Name: Michael Galaviz   : 1975   CSN: [id number]   INSURANCE: [address]    Clinical summary Assessment:  Principal Problem:  Postoperative pain  Active Problems:  Intractable pain  Resolved Problems:  * No resolved hospital problems. *     Plan:  1. R achilles tendonitis with degenerative tear  -Had surgery yesterday with podiatry, pain was too severe to go home so was admitted  -Currently on oxycodone, dilaudid and robaxin. Patient complaining mostly of muscle spasms so will increase robaxin and decrease dilaudid.  -Worked with PT/OT today who are recommending [facility], patient seems to prefer [facility]  -Will have /CM start working on placement, two places have denied as patient too young. Will follow for updates. -Rest of care per podiatry     2. Leukocytosis  -WBC 12 today, may be reactive  -Patient afebrile     3.  DM  -Most recent A1C 2021 was 6.1, now is 6.6   -Will start on ISS, should start metformin at discharge or on FU with PCP  Vitals hypertensive  Labs and Imaging reviewed  MCG criteria applies yes,   Comments Rec upgrade to inpt    This chart was reviewed at 9:31 AM 2022    [address] [place] [address]   CELL : [phone]        Musculoskeletal Surgery or Procedure GRG - Care Day 2 (2022) by Winnie Valdes LPN       Review Status Review Entered   Completed 2022 0705       Created By   Winnie Valdes LPN      Criteria Review      Care Day: 2 Care Date: 2022 Level of Care: Inpatient Floor    Guideline Day 2    Clinical Status    (X) * No ICU or intermediate care needs    Interventions    (X) Inpatient interventions continue    12/7/2022 8:24 AM EST by Wade Barney      pain management    * Milestone   Additional Notes   12/6/22 OBS D2      PERTINENT UPDATES:   Pt feels like she is still having a lot of pain in the R foot. She states that she is also a little nauseous. VITALS:   98.0 R16 P89 163/77 95% RA       ABNL/PERTINENT LABS/RADIOLOGY/DIAGNOSTIC STUDIES:      12/6/22 04:14   WBC: 16.6 (H)      PHYSICAL EXAM:   Pt is AAOx3, NAD. Preoperative physical exam posterior splint in place CDI right lower extremity with dressing CDI left lower extremity. Vascular Exam: Palpable DP and PT pulses bilaterally. Brisk capillary fill time all pedal digits bilaterally. Warm to warm skin temperature proximal lower leg to distal digits bilaterally. Neuro Exam: Epicritic sensation intact       Dermatologic Exam:     -Full-thickness postoperative incisional skin well coapted and sutures in place bilateral posterior Achilles. -Diffuse xerosis bilateral heel. No open wounds or lesions. Pedal hair growth noted. Nails well trimmed and intact. MSK: Muscle strength 4/5 right plantar flexion with pain along the Achilles tendon insertion.   Postoperatively patient is in dressing active but painful range of motion bilateral plantarflexion dorsiflexion plantar able to passively dorsiflex plantarflex all digits bilateral.  Ankle joint range of motion intact subtalar joint range of motion intact.   -------------------------      **IM      Assessment:   Principal Problem:     Postoperative pain      Plan:   R achilles tendonitis with degenerative tear   -Had surgery yesterday with podiatry, pain was too severe to go home so was admitted   -Worked with PT/OT today who are recommending [facility] vs [facility], patient seems to prefer [facility]   -Will have SW/CM start working on placement   -Rest of care per podiatry   -------------------------------------      **PODIATRY ASSESSMENT:   -S/p right Achilles tenotomy, FHL transfer, calcaneal osteotomy (DOS 12/5/2022)   -Admitted for postoperative pain      PLAN:   - Examined and evaluated   - All labs, imaging, and charts reviewed    - WBC: 16.6, likely reactive to surgery   -Proper fluoroscopy calcaneal osteotomy with no acute abnormal changes   -Splint CDI with dressings right lower extremity. Dressing CDI left lower extremity. All dressings can remain intact until first postoperative visit. - Dressings: Xeroform DSD Left foot and ankle. Kindra YOUNGD posterior splint RLE.    -Nonweightbearing right lower extremity at all times. Weightbearing as tolerated left lower extremity surgical shoe.   -Follow-up with Dr. Afia Rudolph in the outpatient setting within 1 week of discharge   -Appreciate all pain management and antibiotic input. MEDICATIONS:      acetaminophen (TYLENOL) tablet 650 mg   Dose: 650 mg   Freq: EVERY 6 HOURS PRN Route: PO x1   HYDROmorphone HCl PF (DILAUDID) injection 0.5 mg   Dose: 0.5 mg   Freq: EVERY 3 HOURS PRN Route: IV x5   methocarbamol (ROBAXIN) tablet 500 mg   Dose: 500 mg   Freq: EVERY 8 HOURS PRN Route: PO x1   promethazine (PHENERGAN) tablet 12.5 mg   Dose: 12.5 mg   Freq: EVERY 6 HOURS PRN Route: PO x1   ondansetron (ZOFRAN) injection 4 mg   Dose: 4 mg   Freq: EVERY 6 HOURS PRN Route: IV x2   oxyCODONE-acetaminophen (PERCOCET) 5-325 MG per tablet 1 tablet   Dose: 1 tablet   Freq: EVERY 4 HOURS PRN Route: PO x3      ORDERS:   Xeroform DSD Left foot and ankle. Kindra YOUNGD posterior splint RLE.

## 2022-12-09 NOTE — PROGRESS NOTES
[address] [place], [place] [address]ist   Progress Note    Admitting Date and Time: 12/5/2022 10:45 AM  Admit Dx: Tendonitis, Achilles, right [M76.61]  Nontraumatic tear of tibialis posterior tendon, right [M66.871]  Right foot pain [M79.671]  Postoperative pain [G89.18]  Intractable pain [R52]    Subjective:    12/7: Pt feels like she is still having a lot of pain in the R foot. She also is complaining of muscle spasms that are very bothersome. She states she picked a facility for [facility]. 12/8: Pt sitting up in bed. States she feels she need something for anxiety. Today, she got upset with nurse in the room and had a violent outburst and that is not like her. Per RN, asking for pain medication regularly. Patient feels pain is even worse today. 12/9:  Pt sitting up in bed. She states pain is somewhat better. Per RN:  precert obtained for [facility] today but patient is wanting to go home.        busPIRone  5 mg Oral TID    escitalopram  10 mg Oral Daily    insulin lispro  0-4 Units SubCUTAneous TID WC    insulin lispro  0-4 Units SubCUTAneous Nightly    famotidine  20 mg Oral BID    sodium chloride flush  10 mL IntraVENous 2 times per day    enoxaparin  40 mg SubCUTAneous Daily     HYDROmorphone, 0.25 mg, Q3H PRN  methocarbamol, 750 mg, Q8H PRN  glucose, 4 tablet, PRN  dextrose bolus, 125 mL, PRN   Or  dextrose bolus, 250 mL, PRN  glucagon (rDNA), 1 mg, PRN  dextrose, , Continuous PRN  oxyCODONE-acetaminophen, 1 tablet, Q4H PRN  diphenhydrAMINE, 50 mg, Q8H PRN  sodium chloride flush, 10 mL, PRN  sodium chloride, , PRN  promethazine, 12.5 mg, Q6H PRN   Or  ondansetron, 4 mg, Q6H PRN  polyethylene glycol, 17 g, Daily PRN  acetaminophen, 650 mg, Q6H PRN   Or  acetaminophen, 650 mg, Q6H PRN       Objective:    /87   Pulse 91   Temp 98.8 °F (37.1 °C) (Oral)   Resp 18   Ht 5' 5\" (1.651 m)   Wt 190 lb (86.2 kg)   LMP 07/16/2018 Comment: always irregular  SpO2 99%   BMI 31.62 kg/m²   General Appearance: alert and oriented to person, place and time and in no acute distress  Skin: warm and dry  Head: normocephalic and atraumatic  Eyes: pupils equal, round, and reactive to light, extraocular eye movements intact, conjunctivae normal  Neck: neck supple and non tender without mass   Pulmonary/Chest: clear to auscultation bilaterally- no wheezes, rales or rhonchi, normal air movement, no respiratory distress  Cardiovascular: normal rate, normal S1 and S2 and no carotid bruits  Abdomen: soft, non-tender, non-distended, normal bowel sounds, no masses or organomegaly  Extremities: no cyanosis, no clubbing. Dressings on b/l feet, appear C/D/I  Neurologic: no cranial nerve deficit and speech normal      Recent Labs     12/07/22  0746 12/08/22  0526 12/09/22  0505    138 139   K 3.5 3.3* 3.9    101 102   CO2 24 27 27   BUN 8 7 7   CREATININE 0.7 0.7 0.7   GLUCOSE 116* 106* 162*   CALCIUM 8.7 8.9 8.6         No results for input(s): ALKPHOS, PROT, LABALBU, BILITOT, AST, ALT in the last 72 hours. Recent Labs     12/07/22  0746 12/08/22  0526 12/09/22  0505   WBC 12.6* 8.7 7.9   RBC 4.69 4.70 4.70   HGB 12.7 12.9 12.7   HCT 37.6 38.3 38.5   MCV 80.2 81.5 81.9   MCH 27.1 27.4 27.0   MCHC 33.8 33.7 33.0   RDW 12.6 12.5 12.3    263 254   MPV 9.2 9.5 9.5           Radiology:   No orders to display       Assessment:  Principal Problem:    Postoperative pain  Active Problems:    Intractable pain  Resolved Problems:    * No resolved hospital problems. *      Plan:      Right Achilles tendinitis with degenerative tear   -POD #4 with podiatry which involve flexor hallux longus tendon transfer to the right ankle and calcaneal ostectomy. Pain was too severe to go home so was admitted  -Currently on oxycodone, dilaudid and robaxin. Patient complaining mostly of muscle spasms so Robaxin was increased;  dilaudid was decreased.   -Worked with PT/OTwho are recommending [facility] vs [facility], patient seems to prefer [facility] and has been accepted at [facility]. Today, patient initially was requesting to go home but has not worked with therapy yet. I was in the room with  and we discussed options and after reconsidering she is agreeable to go to [facility]. We will check with podiatry to see if she will be cleared to have a afternoon therapy session today. -Rest of care per podiatry      Neutrophilic leukocytosis  -WBC 16.6 postop. Likely reactive. Trending down 16.6-->12.6-->8.7 -->7.9  -Patient afebrile       Type II DM  -Most recent A1C 4/2021 was 6.1, now is 6.6   -Started on ISS, should start metformin at discharge or on FU with PCP. 4. Anxiety/situational stress  -Started patient on Lexapro 10 mg daily but since its can take a while to kick in we added BuSpar 5 mg 3 times daily. Trying to avoid benzodiazepines given that she is already using significant amount of pain medication. 5. Disposition  -Patient to go to [facility] subacute rehab. Precert obtained today. Patient agreeable to transfer there tomorrow. MIRZA signed and med rec completed. Case discussed with  in the room. NOTE: This report was transcribed using voice recognition software. Every effort was made to ensure accuracy; however, inadvertent computerized transcription errors may be present.      Electronically signed by Osbaldo Hernandez MD on 12/9/2022 at 1:47 PM

## 2022-12-09 NOTE — PROGRESS NOTES
Physical Therapy      Physical Therapy    Room #:   6390/9460-88    Date: 2022       Patient Name: Alvarado Luong  : 1975      MRN: [id number]     Patient unavailable for physical therapy treatment due to  per podiatry resident note date 22 @ 7:44 AM stated the patient okay to resume therapy treatment tomorrow. Physical therapy will check back at a later time/date.        ANGEL Abreu

## 2022-12-09 NOTE — PROGRESS NOTES
Patient unavailable for occupational therapy treatment due to  per podiatry resident note date 12/9/22 @ 7:44 AM stated the patient okay to resume therapy treatment tomorrow. Occupational  therapy will check back at a later time/date.  Elmira Dumont, Ana Lamar

## 2022-12-10 VITALS
WEIGHT: 190 LBS | BODY MASS INDEX: 31.65 KG/M2 | DIASTOLIC BLOOD PRESSURE: 93 MMHG | HEIGHT: 65 IN | OXYGEN SATURATION: 97 % | TEMPERATURE: 97.8 F | RESPIRATION RATE: 18 BRPM | HEART RATE: 79 BPM | SYSTOLIC BLOOD PRESSURE: 142 MMHG

## 2022-12-10 PROCEDURE — 96372 THER/PROPH/DIAG INJ SC/IM: CPT

## 2022-12-10 PROCEDURE — 96374 THER/PROPH/DIAG INJ IV PUSH: CPT

## 2022-12-10 PROCEDURE — 99284 EMERGENCY DEPT VISIT MOD MDM: CPT

## 2022-12-10 PROCEDURE — 2580000003 HC RX 258: Performed by: INTERNAL MEDICINE

## 2022-12-10 PROCEDURE — 6370000000 HC RX 637 (ALT 250 FOR IP): Performed by: INTERNAL MEDICINE

## 2022-12-10 PROCEDURE — 6370000000 HC RX 637 (ALT 250 FOR IP): Performed by: STUDENT IN AN ORGANIZED HEALTH CARE EDUCATION/TRAINING PROGRAM

## 2022-12-10 PROCEDURE — 6360000002 HC RX W HCPCS: Performed by: STUDENT IN AN ORGANIZED HEALTH CARE EDUCATION/TRAINING PROGRAM

## 2022-12-10 RX ADMIN — DOCUSATE SODIUM 100 MG: 100 CAPSULE, LIQUID FILLED ORAL at 09:28

## 2022-12-10 RX ADMIN — BUSPIRONE HYDROCHLORIDE 5 MG: 5 TABLET ORAL at 09:28

## 2022-12-10 RX ADMIN — METHOCARBAMOL 750 MG: 500 TABLET ORAL at 09:28

## 2022-12-10 RX ADMIN — OXYCODONE AND ACETAMINOPHEN 1 TABLET: 5; 325 TABLET ORAL at 04:06

## 2022-12-10 RX ADMIN — METHOCARBAMOL 750 MG: 500 TABLET ORAL at 00:59

## 2022-12-10 RX ADMIN — DIPHENHYDRAMINE HYDROCHLORIDE 50 MG: 25 TABLET ORAL at 04:12

## 2022-12-10 RX ADMIN — OXYCODONE AND ACETAMINOPHEN 1 TABLET: 5; 325 TABLET ORAL at 09:28

## 2022-12-10 RX ADMIN — SODIUM CHLORIDE, PRESERVATIVE FREE 10 ML: 5 INJECTION INTRAVENOUS at 09:30

## 2022-12-10 RX ADMIN — ESCITALOPRAM OXALATE 10 MG: 10 TABLET ORAL at 09:28

## 2022-12-10 RX ADMIN — FAMOTIDINE 20 MG: 20 TABLET, FILM COATED ORAL at 09:28

## 2022-12-10 RX ADMIN — HYDROMORPHONE HYDROCHLORIDE 0.25 MG: 1 INJECTION, SOLUTION INTRAMUSCULAR; INTRAVENOUS; SUBCUTANEOUS at 10:51

## 2022-12-10 ASSESSMENT — PAIN DESCRIPTION - LOCATION
LOCATION: LEG
LOCATION: FOOT
LOCATION: ABDOMEN
LOCATION: LEG

## 2022-12-10 ASSESSMENT — PAIN DESCRIPTION - ORIENTATION
ORIENTATION: LEFT;RIGHT
ORIENTATION: RIGHT;LEFT

## 2022-12-10 ASSESSMENT — PAIN DESCRIPTION - DESCRIPTORS
DESCRIPTORS: ACHING;PENETRATING;SHARP
DESCRIPTORS: ACHING
DESCRIPTORS: ACHING

## 2022-12-10 ASSESSMENT — PAIN SCALES - GENERAL
PAINLEVEL_OUTOF10: 8
PAINLEVEL_OUTOF10: 8
PAINLEVEL_OUTOF10: 6

## 2022-12-10 NOTE — DISCHARGE INSTR - COC
Continuity of Care Form    Patient Name: Juan Mendes   :  1975  MRN:  [id number]    Admit date:  2022  Discharge date:  12/10/2022    Code Status Order: Full Code   Advance Directives:   [facility] Documentation       Date/Time Healthcare Directive Type of Healthcare Directive Copy in [facility] [address] Agent's Name Healthcare Agent's Phone Number    22 1581 No, patient does not have an advance directive for healthcare treatment -- -- -- -- --            Admitting Physician:  Flaca Cortes MD  PCP: Karely Tracy DO    Discharging Nurse: [facility]/Room#: 0330/0330-02  Discharging Unit Phone Number: [phone]    Emergency Contact:   No emergency contact information on file.     Past Surgical History:  Past Surgical History:   Procedure Laterality Date     SECTION      x 3    DILATION AND CURETTAGE  11    ENDOMETRIAL ABLATION  11    ENDOMETRIAL BIOPSY      FOOT TENDON SURGERY Right 2022    [facility] TENDON TRANSFER AND CALCANEAL OSTECTOMY OF RIGHT FOOT (CPT 82192) performed by Fabiana Patel DPM at [facility]  11    LEEP  2012    [address]    left salpingectomy for ectopic pregnancy    FL TOTAL ABDOM HYSTERECTOMY N/A 2018    TOTAL ABDOMINAL HYSTERECTOMY  AND REMOVAL OF RIGHT FALLOPIAN TUBE performed by Felipa Rodas MD at [address] [place] [address]         Immunization History:   Immunization History   Administered Date(s) Administered    COVID-19, PFIZER PURPLE top, DILUTE for use, (age 15 y+), 30mcg/0.3mL 2021, 10/04/2021    Td (Adult), 2 Lf Tetanus Toxoid, Pf (Td, Absorbed) 2019       Active Problems:  Patient Active Problem List   Diagnosis Code    Abnormal Pap smear     AGCUS (atypical glandular cells of undetermined significance) on Pap smear [id number]    Pelvic pain in female R10.2    Abscess of submandibular region K12.2    Postoperative pain G89.18    Intractable pain R52       Isolation/Infection:   Isolation            No Isolation          Patient Infection Status       None to display            Nurse Assessment:  Last Vital Signs: BP (!) 142/93   Pulse 79   Temp 97.8 °F (36.6 °C) (Oral)   Resp 18   Ht 5' 5\" (1.651 m)   Wt 190 lb (86.2 kg)   LMP 07/16/2018 Comment: always irregular  SpO2 97%   BMI 31.62 kg/m²     Last documented pain score (0-10 scale): Pain Level: 8  Last Weight:   Wt Readings from Last 1 Encounters:   12/05/22 190 lb (86.2 kg)     Mental Status:  oriented and alert    IV Access:  - None    Nursing Mobility/ADLs:  Walking   Assisted  Transfer  Assisted  Bathing  Assisted  Dressing  Assisted  Toileting  Assisted  Feeding  Independent  Med Admin  Assisted  Med Delivery   whole    Wound Care Documentation and Therapy:  Incision 11/28/18 Abdomen (Active)   Number of days: 1472       Incision 12/05/22 Ankle Distal;Left (Active)   Dressing Status Clean;Dry; Intact 12/09/22 2000   Dressing/Treatment Ace wrap 12/09/22 2000   Drainage Amount None 12/09/22 2000   Odor None 12/08/22 2233   Monica-incision Assessment Other (Comment) 12/05/22 2000   Number of days: 4       Incision 12/05/22 Ankle Distal;Right (Active)   Dressing Status Clean;Dry; Intact 12/09/22 2000   Dressing/Treatment Ace wrap 12/09/22 2000   Drainage Amount None 12/09/22 2000   Odor None 12/08/22 2233   Number of days: 4        Elimination:  Continence: Bowel: Yes  Bladder: Yes  Urinary Catheter: None   Colostomy/Ileostomy/Ileal Conduit: No       Date of Last BM: ***    Intake/Output Summary (Last 24 hours) at 12/10/2022 0848  Last data filed at 12/9/2022 1830  Gross per 24 hour   Intake 1020 ml   Output --   Net 1020 ml     I/O last 3 completed shifts: In: 1020 [P.O.:1020]  Out: -     Safety Concerns:      At Risk for Falls    Impairments/Disabilities:      None    Nutrition Therapy:  Current Nutrition Therapy:   - Oral Diet:  General    Routes of Feeding: Oral  Liquids: Thin Liquids  Daily Fluid Restriction: no  Last Modified Barium Swallow with Video (Video Swallowing Test): not done    Treatments at the Time of Hospital Discharge:   Respiratory Treatments: ***  Oxygen Therapy:  is not on home oxygen therapy. Ventilator:    - No ventilator support    Rehab Therapies: Physical Therapy and Occupational Therapy  Weight Bearing Status/Restrictions: No weight bearing restrictions  Other Medical Equipment (for information only, NOT a DME order):  walker and bedside commode  Other Treatments: ***    Patient's personal belongings (please select all that are sent with patient):  uRth    RN SIGNATURE:  Electronically signed by Yuri Ferro RN on 12/10/22 at 9:23 AM EST    CASE MANAGEMENT/SOCIAL WORK SECTION    Inpatient Status Date: ***    Readmission Risk Assessment Score:  Readmission Risk              Risk of Unplanned Readmission:  8           Discharging to Facility/ Agency   Name:   Address:  Phone:  Fax:    Dialysis Facility (if applicable)   Name:  Address:  Dialysis Schedule:  Phone:  Fax:    / signature: {Esignature:[id number]}    PHYSICIAN SECTION    Prognosis: {Prognosis:1432291484}    Condition at Discharge: [facility] Patient Condition:288017996}    Rehab Potential (if transferring to Rehab): {Prognosis:2196069095}    Recommended Labs or Other Treatments After Discharge: ***    Physician Certification: I certify the above information and transfer of Romain Martinez  is necessary for the continuing treatment of the diagnosis listed and that she requires {Admit to Appropriate Level of Care:26266} for {GREATER/LESS:649787395} 30 days.      Update Admission H&P: {CHP DME Changes in CBCTX:498511543}    PHYSICIAN SIGNATURE:  Electronically signed by Debra Deras MD on 12/10/22 at 8:48 AM EST Detail Level: Detailed

## 2022-12-10 NOTE — PLAN OF CARE
Problem: Chronic Conditions and Co-morbidities  Goal: Patient's chronic conditions and co-morbidity symptoms are monitored and maintained or improved  12/10/2022 0255 by Quang Tovar RN  Outcome: Progressing     Problem: Discharge Planning  Goal: Discharge to home or other facility with appropriate resources  12/10/2022 0255 by Quang Tovar RN  Outcome: Progressing     Problem: Pain  Goal: Verbalizes/displays adequate comfort level or baseline comfort level  12/10/2022 0255 by Quang Tovar RN  Outcome: Progressing     Problem: Safety - Adult  Goal: Free from fall injury  12/10/2022 0255 by Quang Tovar RN  Outcome: Progressing     Problem: Safety - Adult  Goal: Free from fall injury  12/10/2022 0255 by Quang Tovar RN  Outcome: Progressing     Problem: ABCDS Injury Assessment  Goal: Absence of physical injury  12/10/2022 0255 by Quang Tovar RN  Outcome: Progressing     Problem: Musculoskeletal - Adult  Goal: Return mobility to safest level of function  Outcome: Progressing     Problem: Musculoskeletal - Adult  Goal: Maintain proper alignment of affected body part  Outcome: Progressing

## 2022-12-10 NOTE — DISCHARGE SUMMARY
Physician Discharge Summary     Patient ID:  Maciel Joseph  [id number]  52 y.o.  1975    Admit date: 12/5/2022    Discharge date and time: No discharge date for patient encounter. Admitting Physician: Carol Ruiz MD     Discharge Physician: Ashley Gardner    Admission Diagnoses: Tendonitis, Achilles, right [M76.61]  Nontraumatic tear of tibialis posterior tendon, right [M66.871]  Right foot pain [M79.671]  Postoperative pain [G89.18]  Intractable pain [R52]    Discharge Diagnoses:   Right Achilles tendinitis with Degenerative tear  Type 2 DM: Anxiety  Leukocytosis    Admission Condition: good    Discharged Condition: fair    Indication for Admission:     Hospital Course:   Patient was admitted with intractable ankle pain. She went to surgery for right achiles tenotomy and calcaneal osteotomy with podiatry. She reported pain and spasm after the procedure and was treated with pain medications. She is being discharged to SNF. Time spent in discharge of patient is greater than 30 minutes. Consults: Podiatry    Significant Diagnostic Studies: labs:     Treatments: pain medications    Discharge Exam:  BP (!) 142/93   Pulse 79   Temp 97.8 °F (36.6 °C) (Oral)   Resp 18   Ht 5' 5\" (1.651 m)   Wt 190 lb (86.2 kg)   LMP 07/16/2018 Comment: always irregular  SpO2 97%   BMI 31.62 kg/m²   General appearance: alert, appears stated age, and cooperative  Head: Normocephalic, without obvious abnormality, atraumatic  Eyes: conjunctivae/corneas clear. PERRL, EOM's intact. Fundi benign.   Ears: normal TM's and external ear canals both ears  Throat: lips, mucosa, and tongue normal; teeth and gums normal  Lungs: clear to auscultation bilaterally  Heart: regular rate and rhythm, S1, S2 normal, no murmur, click, rub or gallop  Abdomen: soft, non-tender; bowel sounds normal; no masses,  no organomegaly  Extremities: extremities normal, atraumatic, no cyanosis or edema  Pulses: 2+ and symmetric    Disposition: home    In process/preliminary results:  Outstanding Order Results       Date and Time Order Name Status Description    12/5/2022 12:00 AM Surgical Pathology In process             Patient Instructions:   Current Discharge Medication List        START taking these medications    Details   busPIRone (BUSPAR) 5 MG tablet Take 1 tablet by mouth 3 times daily      escitalopram (LEXAPRO) 10 MG tablet Take 1 tablet by mouth daily  Qty: 30 tablet, Refills: 3      famotidine (PEPCID) 20 MG tablet Take 1 tablet by mouth 2 times daily  Qty: 60 tablet, Refills: 3      polyethylene glycol (GLYCOLAX) 17 g packet Take 17 g by mouth daily as needed for Constipation  Qty: 527 g, Refills: 1      methocarbamol (ROBAXIN) 750 MG tablet Take 1 tablet by mouth every 8 hours as needed (spasm)      diphenhydrAMINE (BENADRYL) 50 MG tablet Take 1 tablet by mouth every 8 hours as needed for Itching      promethazine (PHENERGAN) 12.5 MG tablet Take 1 tablet by mouth every 6 hours as needed for Nausea      doxycycline hyclate (VIBRA-TABS) 100 MG tablet Take 1 tablet by mouth 2 times daily for 10 days  Qty: 20 tablet, Refills: 0      HYDROcodone-acetaminophen (NORCO) 5-325 MG per tablet Take 1 tablet by mouth every 4 hours as needed for Pain for up to 7 days. Intended supply: 7 days.  Take lowest dose possible to manage pain  Qty: 42 tablet, Refills: 0    Comments: Reduce doses taken as pain becomes manageable  Associated Diagnoses: Postoperative pain           CONTINUE these medications which have NOT CHANGED    Details   ibuprofen (IBU) 600 MG tablet Take 1 tablet by mouth every 8 hours as needed for Pain  Qty: 30 tablet, Refills: 0           Activity: activity as tolerated  Diet: diabetic diet  Wound Care: as directed    Follow-up with PCP/Podiatry in     Signed:  Yasmin Alfonsowu  12/10/2022  10:08 AM

## 2022-12-11 ENCOUNTER — HOSPITAL ENCOUNTER (EMERGENCY)
Age: 47
Discharge: HOME | End: 2022-12-11
Attending: EMERGENCY MEDICINE
Payer: MEDICAID

## 2022-12-11 VITALS
HEART RATE: 89 BPM | BODY MASS INDEX: 33.32 KG/M2 | DIASTOLIC BLOOD PRESSURE: 102 MMHG | RESPIRATION RATE: 16 BRPM | HEIGHT: 65 IN | WEIGHT: 200 LBS | SYSTOLIC BLOOD PRESSURE: 145 MMHG | TEMPERATURE: 98.1 F | OXYGEN SATURATION: 97 %

## 2022-12-11 DIAGNOSIS — S86.019A ACHILLES TENDON AVULSION: ICD-10-CM

## 2022-12-11 DIAGNOSIS — M79.604 PAIN OF RIGHT LOWER EXTREMITY: Primary | ICD-10-CM

## 2022-12-11 PROCEDURE — 6360000002 HC RX W HCPCS: Performed by: EMERGENCY MEDICINE

## 2022-12-11 RX ORDER — ORPHENADRINE CITRATE 100 MG/1
100 TABLET, EXTENDED RELEASE ORAL 2 TIMES DAILY
Qty: 10 TABLET | Refills: 0 | Status: SHIPPED | OUTPATIENT
Start: 2022-12-11 | End: 2022-12-16

## 2022-12-11 RX ORDER — OXYCODONE HYDROCHLORIDE AND ACETAMINOPHEN 5; 325 MG/1; MG/1
1 TABLET ORAL EVERY 6 HOURS PRN
Qty: 10 TABLET | Refills: 0 | Status: SHIPPED | OUTPATIENT
Start: 2022-12-11 | End: 2022-12-11 | Stop reason: SDUPTHER

## 2022-12-11 RX ORDER — OXYCODONE HYDROCHLORIDE AND ACETAMINOPHEN 5; 325 MG/1; MG/1
1 TABLET ORAL EVERY 6 HOURS PRN
Qty: 10 TABLET | Refills: 0 | Status: SHIPPED | OUTPATIENT
Start: 2022-12-11 | End: 2022-12-14

## 2022-12-11 RX ORDER — ORPHENADRINE CITRATE 100 MG/1
100 TABLET, EXTENDED RELEASE ORAL 2 TIMES DAILY
Qty: 10 TABLET | Refills: 0 | Status: SHIPPED | OUTPATIENT
Start: 2022-12-11 | End: 2022-12-11 | Stop reason: SDUPTHER

## 2022-12-11 RX ORDER — ORPHENADRINE CITRATE 30 MG/ML
60 INJECTION INTRAMUSCULAR; INTRAVENOUS ONCE
Status: COMPLETED | OUTPATIENT
Start: 2022-12-11 | End: 2022-12-11

## 2022-12-11 RX ADMIN — ORPHENADRINE CITRATE 60 MG: 30 INJECTION INTRAMUSCULAR; INTRAVENOUS at 10:26

## 2022-12-11 RX ADMIN — HYDROMORPHONE HYDROCHLORIDE 1 MG: 1 INJECTION, SOLUTION INTRAMUSCULAR; INTRAVENOUS; SUBCUTANEOUS at 08:42

## 2022-12-11 ASSESSMENT — PAIN SCALES - GENERAL
PAINLEVEL_OUTOF10: 8
PAINLEVEL_OUTOF10: 8

## 2022-12-11 ASSESSMENT — PAIN DESCRIPTION - ORIENTATION
ORIENTATION: LEFT;RIGHT
ORIENTATION: LEFT;RIGHT

## 2022-12-11 ASSESSMENT — PAIN DESCRIPTION - LOCATION
LOCATION: LEG
LOCATION: FOOT

## 2022-12-11 ASSESSMENT — PAIN DESCRIPTION - DESCRIPTORS
DESCRIPTORS: SPASM;SHARP
DESCRIPTORS: SPASM

## 2022-12-11 NOTE — ED PROVIDER NOTES
HPI:  22, Time: 8:22 AM DAVID Martinez is a 52 y.o. female presenting to the ED for RT ankle pain SP tendon rupture repair on  at [facility] by Dr. Brian Ellis., beginning several hours ago. The complaint has been persistent, severe in severity, and worsened by nothing. Patient was just discharged yesterday from the hospital and sent to rehab facility according the patient she was unable to get her medications there in the process of being admitted. She came to emergency department for pain control. She is on no analgesics. States she has not been ambulating. She has no trauma or fall. She denies chest pain shortness of breath. She has no fevers or chills. Review of Systems:   A complete review of systems was performed and pertinent positives and negatives are stated within HPI, all other systems reviewed and are negative.    --------------------------------------------- PAST HISTORY ---------------------------------------------  Past Medical History:  has a past medical history of Abnormal Pap smear, Asthma, Diabetes mellitus ([address] Dizziness, Headache, Hypertension, Memory difficulties, and Vertigo. Past Surgical History:  has a past surgical history that includes  section; Tubal ligation (); pelvic laparoscopy (); Dilation & curettage (11); hysteroscopy (11); Endometrial ablation (11); Tonsillectomy; LEEP (2012); Endometrial biopsy; pr total abdom hysterectomy (N/A, 2018); and Foot Tendon Surgery (Right, 2022). Social History:  reports that she has never smoked. She has never used smokeless tobacco. She reports current alcohol use of about 1.0 standard drink per week. She reports current drug use. Drug: Marijuana [place] Kayleigh). Family History: family history includes Breast Cancer in her maternal aunt;  Cancer in her father; Diabetes in her brother; Hypertension in her father and mother; Lupus in her mother; Sickle Cell Anemia in her daughter; Sickle Cell Trait in her daughter. The patients home medications have been reviewed. Allergies: Aspirin    -------------------------------------------------- RESULTS -------------------------------------------------  All laboratory and radiology results have been personally reviewed by myself   LABS:  No results found for this visit on 12/11/22. RADIOLOGY:  Interpreted by Radiologist.  No orders to display       ------------------------- NURSING NOTES AND VITALS REVIEWED ---------------------------   The nursing notes within the ED encounter and vital signs as below have been reviewed. BP (!) 145/102   Pulse 89   Temp 98.1 °F (36.7 °C) (Oral)   Resp 16   Ht 5' 5\" (1.651 m)   Wt 200 lb (90.7 kg)   LMP 07/16/2018 Comment: always irregular  SpO2 97%   BMI 33.28 kg/m²   Oxygen Saturation Interpretation: Normal      ---------------------------------------------------PHYSICAL EXAM--------------------------------------      Constitutional/General: Alert and oriented x3, well appearing, non toxic in NAD  Head: Normocephalic and atraumatic  Eyes: PERRL, EOMI  Mouth: Oropharynx clear, handling secretions, no trismus  Neck: Supple, full ROM,   Pulmonary: Lungs clear to auscultation bilaterally, no wheezes, rales, or rhonchi. Not in respiratory distress  Cardiovascular:  Regular rate and rhythm, no murmurs, gallops, or rubs. 2+ distal pulses  Abdomen: Soft, non tender, non distended,   Extremities: Moves all extremities x 4. Warm and well perfused, patient has a splint on the right ankle. She moves her toes. Cap refills less than 2 seconds. She has a palpable pulse. Ace wrap and postop shoe on her left foot. Skin: warm and dry without rash  Neurologic: GCS 15, focal deficits.   Psych: Normal Affect    ------------------------------ ED COURSE/MEDICAL DECISION MAKING----------------------  Medications   orphenadrine (NORFLEX) injection 60 mg (has no administration in time range) HYDROmorphone (DILAUDID) injection 1 mg (1 mg IntraVENous Given 12/11/22 1527)         ED COURSE:       Medical Decision Making:    Patient presents for pain control. She recently had Achilles tendon repair was discharged from [facility] to rehab facility which was not ready to accept her. Patient's had no analgesics since 11:00 yesterday morning. She was given IV Dilaudid. She will be discharged back to rehab facility with a prescription for pain medicine. Counseling: The emergency provider has spoken with the patient and discussed todays results, in addition to providing specific details for the plan of care and counseling regarding the diagnosis and prognosis. Questions are answered at this time and they are agreeable with the plan.      --------------------------------- IMPRESSION AND DISPOSITION ---------------------------------    IMPRESSION  1. Pain of right lower extremity    2. Achilles tendon avulsion        DISPOSITION  Disposition: Discharge to nursing home  Patient condition is stable      NOTE: This report was transcribed using voice recognition software.  Every effort was made to ensure accuracy; however, inadvertent computerized transcription errors may be present       Jami Cooper DO  12/11/22 1026

## 2022-12-31 ENCOUNTER — APPOINTMENT (OUTPATIENT)
Dept: CT IMAGING | Age: 47
End: 2022-12-31
Payer: MEDICAID

## 2022-12-31 ENCOUNTER — HOSPITAL ENCOUNTER (EMERGENCY)
Age: 47
Discharge: HOME | End: 2022-12-31
Attending: EMERGENCY MEDICINE
Payer: MEDICAID

## 2022-12-31 VITALS
OXYGEN SATURATION: 99 % | RESPIRATION RATE: 16 BRPM | BODY MASS INDEX: 44.66 KG/M2 | TEMPERATURE: 98.4 F | SYSTOLIC BLOOD PRESSURE: 169 MMHG | DIASTOLIC BLOOD PRESSURE: 107 MMHG | HEIGHT: 55 IN | HEART RATE: 95 BPM | WEIGHT: 193 LBS

## 2022-12-31 DIAGNOSIS — S30.0XXA CONTUSION OF LOWER BACK, INITIAL ENCOUNTER: ICD-10-CM

## 2022-12-31 DIAGNOSIS — S00.81XA ABRASION OF FACE, INITIAL ENCOUNTER: ICD-10-CM
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
## 2022-12-31 DIAGNOSIS — S16.1XXA ACUTE STRAIN OF NECK MUSCLE, INITIAL ENCOUNTER: ICD-10-CM

## 2022-12-31 DIAGNOSIS — S09.90XA CLOSED HEAD INJURY, INITIAL ENCOUNTER: ICD-10-CM

## 2022-12-31 PROCEDURE — 72125 CT NECK SPINE W/O DYE: CPT

## 2022-12-31 PROCEDURE — 6370000000 HC RX 637 (ALT 250 FOR IP): Performed by: EMERGENCY MEDICINE

## 2022-12-31 PROCEDURE — 70486 CT MAXILLOFACIAL W/O DYE: CPT

## 2022-12-31 PROCEDURE — 72192 CT PELVIS W/O DYE: CPT

## 2022-12-31 PROCEDURE — 99284 EMERGENCY DEPT VISIT MOD MDM: CPT

## 2022-12-31 PROCEDURE — 96372 THER/PROPH/DIAG INJ SC/IM: CPT

## 2022-12-31 PROCEDURE — 70450 CT HEAD/BRAIN W/O DYE: CPT

## 2022-12-31 PROCEDURE — 6360000002 HC RX W HCPCS: Performed by: EMERGENCY MEDICINE

## 2022-12-31 PROCEDURE — 72128 CT CHEST SPINE W/O DYE: CPT

## 2022-12-31 PROCEDURE — 72131 CT LUMBAR SPINE W/O DYE: CPT

## 2022-12-31 RX ORDER — DOCUSATE SODIUM 100 MG/1
100 CAPSULE, LIQUID FILLED ORAL 2 TIMES DAILY
Qty: 12 CAPSULE | Refills: 1 | Status: SHIPPED | OUTPATIENT
Start: 2022-12-31

## 2022-12-31 RX ORDER — ONDANSETRON 4 MG/1
8 TABLET, ORALLY DISINTEGRATING ORAL
Status: COMPLETED | OUTPATIENT
Start: 2022-12-31 | End: 2022-12-31

## 2022-12-31 RX ORDER — OXYCODONE HYDROCHLORIDE AND ACETAMINOPHEN 5; 325 MG/1; MG/1
2 TABLET ORAL ONCE
Status: DISCONTINUED | OUTPATIENT
Start: 2022-12-31 | End: 2022-12-31 | Stop reason: HOSPADM

## 2022-12-31 RX ORDER — ONDANSETRON 4 MG/1
8 TABLET, ORALLY DISINTEGRATING ORAL 3 TIMES DAILY PRN
Qty: 14 TABLET | Refills: 0 | Status: SHIPPED | OUTPATIENT
Start: 2022-12-31

## 2022-12-31 RX ORDER — OXYCODONE HYDROCHLORIDE AND ACETAMINOPHEN 5; 325 MG/1; MG/1
1 TABLET ORAL EVERY 6 HOURS PRN
Qty: 14 TABLET | Refills: 0 | Status: SHIPPED | OUTPATIENT
Start: 2022-12-31 | End: 2023-01-03

## 2022-12-31 RX ORDER — NAPROXEN 500 MG/1
500 TABLET ORAL 2 TIMES DAILY WITH MEALS
Qty: 20 TABLET | Refills: 0 | Status: SHIPPED | OUTPATIENT
Start: 2022-12-31

## 2022-12-31 RX ADMIN — ONDANSETRON 8 MG: 4 TABLET, ORALLY DISINTEGRATING ORAL at 05:44

## 2022-12-31 RX ADMIN — HYDROMORPHONE HYDROCHLORIDE 1 MG: 1 INJECTION, SOLUTION INTRAMUSCULAR; INTRAVENOUS; SUBCUTANEOUS at 04:58

## 2022-12-31 ASSESSMENT — PAIN DESCRIPTION - LOCATION
LOCATION: HEAD
LOCATION: HEAD
LOCATION: BACK;HEAD

## 2022-12-31 ASSESSMENT — PAIN DESCRIPTION - DESCRIPTORS: DESCRIPTORS: POUNDING

## 2022-12-31 ASSESSMENT — PAIN SCALES - GENERAL
PAINLEVEL_OUTOF10: 10

## 2022-12-31 ASSESSMENT — PAIN DESCRIPTION - ORIENTATION: ORIENTATION: ANTERIOR

## 2022-12-31 ASSESSMENT — PAIN - FUNCTIONAL ASSESSMENT: PAIN_FUNCTIONAL_ASSESSMENT: 0-10

## 2022-12-31 ASSESSMENT — PAIN DESCRIPTION - FREQUENCY: FREQUENCY: CONTINUOUS

## 2022-12-31 NOTE — ED NOTES
Wound care for nose laceration, cleanse with wound cleanser and dressed with [id number] Debora Shea RN  12/31/22 7922

## 2022-12-31 NOTE — DISCHARGE INSTRUCTIONS
NOTE:  Get IMMEDIATE medical attention if any new/worsening symptoms occur. Make sure to follow-up with your family doctor in 3 to 4 days for reevaluation and for blood pressure recheck. Cleanse abrasions twice daily soap and water, gently pat dry plus apply Neosporin or other antibiotic ointment.
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
## 2022-12-31 NOTE — Clinical Note
Diaz Mcallister was seen and treated in our emergency department on 12/31/2022. She may return to work on 01/07/2023. If you have any questions or concerns, please don't hesitate to call.       Lisette Randhawa MD

## 2022-12-31 NOTE — ED NOTES
Patient has laceration to bridge of nose, minimum bleeding , forehead swelling     Rosalba Ayala, RN  12/31/22 0071

## 2024-04-05 ENCOUNTER — HOSPITAL ENCOUNTER (EMERGENCY)
Age: 49
Discharge: HOME | End: 2024-04-05
Attending: EMERGENCY MEDICINE
Payer: MEDICAID

## 2024-04-05 VITALS
SYSTOLIC BLOOD PRESSURE: 146 MMHG | OXYGEN SATURATION: 98 % | DIASTOLIC BLOOD PRESSURE: 96 MMHG | WEIGHT: 201 LBS | HEIGHT: 65 IN | RESPIRATION RATE: 16 BRPM | BODY MASS INDEX: 33.49 KG/M2 | HEART RATE: 94 BPM | TEMPERATURE: 99.8 F

## 2024-04-05 DIAGNOSIS — N89.8 VAGINAL ODOR: ICD-10-CM

## 2024-04-05 DIAGNOSIS — J06.9 ACUTE UPPER RESPIRATORY INFECTION: Primary | ICD-10-CM

## 2024-04-05 LAB
BILIRUB UR QL STRIP: NEGATIVE
CLARITY UR: CLEAR
COLOR UR: YELLOW
EPI CELLS #/AREA URNS HPF: ABNORMAL /HPF
GLUCOSE UR STRIP-MCNC: NEGATIVE MG/DL
HGB UR QL STRIP.AUTO: NEGATIVE
KETONES UR STRIP-MCNC: NEGATIVE MG/DL
LEUKOCYTE ESTERASE UR QL STRIP: ABNORMAL
NITRITE UR QL STRIP: NEGATIVE
PH UR STRIP: 6 [PH] (ref 5–9)
PROT UR STRIP-MCNC: ABNORMAL MG/DL
RBC #/AREA URNS HPF: ABNORMAL /HPF
SP GR UR STRIP: 1.02 (ref 1–1.03)
UROBILINOGEN UR STRIP-ACNC: 0.2 EU/DL (ref 0–1)
WBC #/AREA URNS HPF: ABNORMAL /HPF

## 2024-04-05 PROCEDURE — 99283 EMERGENCY DEPT VISIT LOW MDM: CPT

## 2024-04-05 PROCEDURE — 81001 URINALYSIS AUTO W/SCOPE: CPT

## 2024-04-05 RX ORDER — METRONIDAZOLE 500 MG/1
500 TABLET ORAL 2 TIMES DAILY
Qty: 14 TABLET | Refills: 0 | Status: SHIPPED | OUTPATIENT
Start: 2024-04-05 | End: 2024-04-05

## 2024-04-05 RX ORDER — METRONIDAZOLE 500 MG/1
500 TABLET ORAL 2 TIMES DAILY
Qty: 14 TABLET | Refills: 0 | Status: SHIPPED | OUTPATIENT
Start: 2024-04-05 | End: 2024-04-12

## 2024-04-05 RX ORDER — BROMPHENIRAMINE MALEATE, PSEUDOEPHEDRINE HYDROCHLORIDE, AND DEXTROMETHORPHAN HYDROBROMIDE 2; 30; 10 MG/5ML; MG/5ML; MG/5ML
5 SYRUP ORAL 4 TIMES DAILY PRN
Qty: 120 ML | Refills: 0 | Status: SHIPPED | OUTPATIENT
Start: 2024-04-05

## 2024-04-05 RX ORDER — BROMPHENIRAMINE MALEATE, PSEUDOEPHEDRINE HYDROCHLORIDE, AND DEXTROMETHORPHAN HYDROBROMIDE 2; 30; 10 MG/5ML; MG/5ML; MG/5ML
5 SYRUP ORAL 4 TIMES DAILY PRN
Qty: 120 ML | Refills: 0 | Status: SHIPPED | OUTPATIENT
Start: 2024-04-05 | End: 2024-04-05

## 2024-04-05 RX ORDER — DULAGLUTIDE 0.75 MG/.5ML
0.75 INJECTION, SOLUTION SUBCUTANEOUS WEEKLY
COMMUNITY

## 2024-04-05 ASSESSMENT — ENCOUNTER SYMPTOMS
SORE THROAT: 0
EYE PAIN: 0
SINUS PRESSURE: 0
EYE DISCHARGE: 0
RHINORRHEA: 1
NAUSEA: 0
ABDOMINAL DISTENTION: 0
DIARRHEA: 0
VOMITING: 0
WHEEZING: 0
COUGH: 1
SHORTNESS OF BREATH: 0
EYE REDNESS: 0
BACK PAIN: 0

## 2024-04-05 ASSESSMENT — PAIN DESCRIPTION - LOCATION: LOCATION: HEAD

## 2024-04-05 ASSESSMENT — PAIN DESCRIPTION - DESCRIPTORS: DESCRIPTORS: ACHING

## 2024-04-05 ASSESSMENT — PAIN SCALES - GENERAL: PAINLEVEL_OUTOF10: 7

## 2024-04-05 ASSESSMENT — PAIN DESCRIPTION - PAIN TYPE: TYPE: ACUTE PAIN

## 2024-04-05 ASSESSMENT — PAIN DESCRIPTION - FREQUENCY: FREQUENCY: CONTINUOUS

## 2024-04-05 ASSESSMENT — PAIN - FUNCTIONAL ASSESSMENT: PAIN_FUNCTIONAL_ASSESSMENT: 0-10

## 2024-04-05 NOTE — ED PROVIDER NOTES
Also had anal sex 2 days ago and is now complaining of vaginal odor/ No vaginal discharge    The history is provided by the patient.   URI  Presenting symptoms: congestion, cough, facial pain, fatigue and rhinorrhea    Presenting symptoms: no ear pain, no fever and no sore throat    Severity:  Moderate  Onset quality:  Sudden  Duration:  2 days  Chronicity:  New  Associated symptoms: no arthralgias, no headaches and no wheezing         Review of Systems   Constitutional:  Positive for fatigue. Negative for chills and fever.   HENT:  Positive for congestion and rhinorrhea. Negative for ear pain, sinus pressure and sore throat.    Eyes:  Negative for pain, discharge and redness.   Respiratory:  Positive for cough. Negative for shortness of breath and wheezing.    Cardiovascular:  Negative for chest pain.   Gastrointestinal:  Negative for abdominal distention, diarrhea, nausea and vomiting.   Genitourinary:  Negative for dysuria and frequency.   Musculoskeletal:  Negative for arthralgias and back pain.   Skin:  Negative for rash and wound.   Neurological:  Negative for weakness and headaches.   Hematological:  Negative for adenopathy.   All other systems reviewed and are negative.       Physical Exam  Vitals and nursing note reviewed.   Constitutional:       Appearance: She is well-developed.   HENT:      Head: Normocephalic and atraumatic.      Right Ear: Tympanic membrane is retracted.      Left Ear: Tympanic membrane is retracted.      Nose: Mucosal edema, congestion and rhinorrhea present.   Eyes:      Pupils: Pupils are equal, round, and reactive to light.   Cardiovascular:      Rate and Rhythm: Normal rate and regular rhythm.      Heart sounds: Normal heart sounds. No murmur heard.  Pulmonary:      Effort: Pulmonary effort is normal. No respiratory distress.      Breath sounds: Normal breath sounds. No wheezing or rales.   Abdominal:      General: Bowel sounds are normal.      Palpations: Abdomen is soft.       Tenderness: There is no abdominal tenderness. There is no guarding or rebound.   Musculoskeletal:      Cervical back: Normal range of motion and neck supple.   Skin:     General: Skin is warm and dry.   Neurological:      Mental Status: She is alert and oriented to person, place, and time.      Cranial Nerves: No cranial nerve deficit.      Coordination: Coordination normal.          Procedures     [facility]          --------------------------------------------- PAST HISTORY ---------------------------------------------  Past Medical History:  has a past medical history of Abnormal Pap smear, Asthma, Diabetes mellitus (HCC), Dizziness, Headache, Hypertension, Memory difficulties, and Vertigo.    Past Surgical History:  has a past surgical history that includes  section; Tubal ligation (); pelvic laparoscopy (); Dilation & curettage (11); hysteroscopy (11); Endometrial ablation (11); Tonsillectomy; LEEP (2012); Endometrial biopsy; pr total abdominal hysterect w/wo rmvl tube ovary (N/A, 2018); and Foot Tendon Surgery (Right, 2022).    Social History:  reports that she has never smoked. She has never used smokeless tobacco. She reports current alcohol use of about 1.0 standard drink of alcohol per week. She reports current drug use. Drug: Marijuana (Weed).    Family History: family history includes Breast Cancer in her maternal aunt; Cancer in her father; Diabetes in her brother; Hypertension in her father and mother; Lupus in her mother; Sickle Cell Anemia in her daughter; Sickle Cell Trait in her daughter.     The patient’s home medications have been reviewed.    Allergies: Aspirin    -------------------------------------------------- RESULTS -------------------------------------------------  Labs:  Results for orders placed or performed during the hospital encounter of 24   Urinalysis with Microscopic   Result Value Ref Range    Color, UA Yellow Yellow    Turbidity UA Clear

## 2024-06-16 ENCOUNTER — APPOINTMENT (OUTPATIENT)
Dept: CT IMAGING | Age: 49
End: 2024-06-16
Payer: MEDICAID

## 2024-06-16 ENCOUNTER — HOSPITAL ENCOUNTER (EMERGENCY)
Age: 49
Discharge: HOME | End: 2024-06-16
Attending: EMERGENCY MEDICINE
Payer: MEDICAID

## 2024-06-16 ENCOUNTER — APPOINTMENT (OUTPATIENT)
Dept: GENERAL RADIOLOGY | Age: 49
End: 2024-06-16
Payer: MEDICAID

## 2024-06-16 VITALS
RESPIRATION RATE: 11 BRPM | WEIGHT: 193 LBS | OXYGEN SATURATION: 95 % | SYSTOLIC BLOOD PRESSURE: 122 MMHG | DIASTOLIC BLOOD PRESSURE: 86 MMHG | TEMPERATURE: 97.6 F | BODY MASS INDEX: 32.15 KG/M2 | HEIGHT: 65 IN | HEART RATE: 71 BPM

## 2024-06-16 DIAGNOSIS — F19.10 POLYSUBSTANCE ABUSE (HCC): ICD-10-CM

## 2024-06-16 DIAGNOSIS — T50.901A ACCIDENTAL OVERDOSE, INITIAL ENCOUNTER: Primary | ICD-10-CM

## 2024-06-16 DIAGNOSIS — N30.00 ACUTE CYSTITIS WITHOUT HEMATURIA: ICD-10-CM

## 2024-06-16 LAB
ALBUMIN SERPL-MCNC: 4.4 G/DL (ref 3.5–5.2)
ALP SERPL-CCNC: 105 U/L (ref 35–104)
ALT SERPL-CCNC: 19 U/L (ref 0–32)
AMPHET UR QL SCN: NEGATIVE
ANION GAP SERPL CALCULATED.3IONS-SCNC: 17 MMOL/L (ref 7–16)
APAP SERPL-MCNC: <5 UG/ML (ref 10–30)
AST SERPL-CCNC: 19 U/L (ref 0–31)
BACTERIA URNS QL MICRO: ABNORMAL
BARBITURATES UR QL SCN: NEGATIVE
BASOPHILS # BLD: 0.03 K/UL (ref 0–0.2)
BASOPHILS NFR BLD: 0 % (ref 0–2)
BENZODIAZ UR QL: NEGATIVE
BILIRUB SERPL-MCNC: 0.4 MG/DL (ref 0–1.2)
BILIRUB UR QL STRIP: NEGATIVE
BUN SERPL-MCNC: 14 MG/DL (ref 6–20)
BUPRENORPHINE UR QL: NEGATIVE
CALCIUM SERPL-MCNC: 8.5 MG/DL (ref 8.6–10.2)
CANNABINOIDS UR QL SCN: POSITIVE
CHLORIDE SERPL-SCNC: 101 MMOL/L (ref 98–107)
CLARITY UR: CLEAR
CO2 SERPL-SCNC: 20 MMOL/L (ref 22–29)
COCAINE UR QL SCN: POSITIVE
COLOR UR: YELLOW
CREAT SERPL-MCNC: 0.8 MG/DL (ref 0.5–1)
EOSINOPHIL # BLD: 0.11 K/UL (ref 0.05–0.5)
EOSINOPHILS RELATIVE PERCENT: 1 % (ref 0–6)
EPI CELLS #/AREA URNS HPF: ABNORMAL /HPF
ERYTHROCYTE [DISTWIDTH] IN BLOOD BY AUTOMATED COUNT: 13.2 % (ref 11.5–15)
ETHANOLAMINE SERPL-MCNC: <10 MG/DL (ref 0–0.08)
FENTANYL UR QL: POSITIVE
GFR, ESTIMATED: 88 ML/MIN/1.73M2
GLUCOSE SERPL-MCNC: 220 MG/DL (ref 74–99)
GLUCOSE UR STRIP-MCNC: NEGATIVE MG/DL
HCT VFR BLD AUTO: 37.1 % (ref 34–48)
HGB BLD-MCNC: 12.5 G/DL (ref 11.5–15.5)
HGB UR QL STRIP.AUTO: NEGATIVE
IMM GRANULOCYTES # BLD AUTO: 0.06 K/UL (ref 0–0.58)
IMM GRANULOCYTES NFR BLD: 1 % (ref 0–5)
KETONES UR STRIP-MCNC: NEGATIVE MG/DL
LEUKOCYTE ESTERASE UR QL STRIP: ABNORMAL
LYMPHOCYTES NFR BLD: 3.74 K/UL (ref 1.5–4)
LYMPHOCYTES RELATIVE PERCENT: 30 % (ref 20–42)
MAGNESIUM SERPL-MCNC: 1.7 MG/DL (ref 1.6–2.6)
MCH RBC QN AUTO: 27.3 PG (ref 26–35)
MCHC RBC AUTO-ENTMCNC: 33.7 G/DL (ref 32–34.5)
MCV RBC AUTO: 81 FL (ref 80–99.9)
METHADONE UR QL: NEGATIVE
MONOCYTES NFR BLD: 0.52 K/UL (ref 0.1–0.95)
MONOCYTES NFR BLD: 4 % (ref 2–12)
NEUTROPHILS NFR BLD: 65 % (ref 43–80)
NEUTS SEG NFR BLD: 8.13 K/UL (ref 1.8–7.3)
NITRITE UR QL STRIP: POSITIVE
OPIATES UR QL SCN: NEGATIVE
OXYCODONE UR QL SCN: NEGATIVE
PCP UR QL SCN: NEGATIVE
PH UR STRIP: 6 [PH] (ref 5–9)
PLATELET # BLD AUTO: 279 K/UL (ref 130–450)
PMV BLD AUTO: 9.1 FL (ref 7–12)
POTASSIUM SERPL-SCNC: 3.3 MMOL/L (ref 3.5–5)
PROT SERPL-MCNC: 7.6 G/DL (ref 6.4–8.3)
PROT UR STRIP-MCNC: NEGATIVE MG/DL
RBC # BLD AUTO: 4.58 M/UL (ref 3.5–5.5)
RBC #/AREA URNS HPF: ABNORMAL /HPF
SALICYLATES SERPL-MCNC: <0.3 MG/DL (ref 0–30)
SODIUM SERPL-SCNC: 138 MMOL/L (ref 132–146)
SP GR UR STRIP: 1.02 (ref 1–1.03)
TEST INFORMATION: ABNORMAL
TOXIC TRICYCLIC SC,BLOOD: NEGATIVE
TROPONIN I SERPL HS-MCNC: <6 NG/L (ref 0–9)
UROBILINOGEN UR STRIP-ACNC: 0.2 EU/DL (ref 0–1)
WBC #/AREA URNS HPF: ABNORMAL /HPF
WBC OTHER # BLD: 12.6 K/UL (ref 4.5–11.5)

## 2024-06-16 PROCEDURE — 80053 COMPREHEN METABOLIC PANEL: CPT

## 2024-06-16 PROCEDURE — 80143 DRUG ASSAY ACETAMINOPHEN: CPT

## 2024-06-16 PROCEDURE — 96374 THER/PROPH/DIAG INJ IV PUSH: CPT

## 2024-06-16 PROCEDURE — 6370000000 HC RX 637 (ALT 250 FOR IP)

## 2024-06-16 PROCEDURE — 93005 ELECTROCARDIOGRAM TRACING: CPT

## 2024-06-16 PROCEDURE — 70450 CT HEAD/BRAIN W/O DYE: CPT

## 2024-06-16 PROCEDURE — 80307 DRUG TEST PRSMV CHEM ANLYZR: CPT

## 2024-06-16 PROCEDURE — 80179 DRUG ASSAY SALICYLATE: CPT

## 2024-06-16 PROCEDURE — 99285 EMERGENCY DEPT VISIT HI MDM: CPT

## 2024-06-16 PROCEDURE — 84484 ASSAY OF TROPONIN QUANT: CPT

## 2024-06-16 PROCEDURE — 6360000002 HC RX W HCPCS

## 2024-06-16 PROCEDURE — 83735 ASSAY OF MAGNESIUM: CPT

## 2024-06-16 PROCEDURE — 2580000003 HC RX 258

## 2024-06-16 PROCEDURE — 85025 COMPLETE CBC W/AUTO DIFF WBC: CPT

## 2024-06-16 PROCEDURE — G0480 DRUG TEST DEF 1-7 CLASSES: HCPCS

## 2024-06-16 PROCEDURE — 81001 URINALYSIS AUTO W/SCOPE: CPT

## 2024-06-16 PROCEDURE — 71045 X-RAY EXAM CHEST 1 VIEW: CPT

## 2024-06-16 RX ORDER — 0.9 % SODIUM CHLORIDE 0.9 %
1000 INTRAVENOUS SOLUTION INTRAVENOUS ONCE
Status: COMPLETED | OUTPATIENT
Start: 2024-06-16 | End: 2024-06-16

## 2024-06-16 RX ORDER — CEFDINIR 300 MG/1
300 CAPSULE ORAL 2 TIMES DAILY
Qty: 14 CAPSULE | Refills: 0 | Status: SHIPPED | OUTPATIENT
Start: 2024-06-16 | End: 2024-06-23

## 2024-06-16 RX ADMIN — WATER 1000 MG: 1 INJECTION INTRAMUSCULAR; INTRAVENOUS; SUBCUTANEOUS at 02:30

## 2024-06-16 RX ADMIN — SODIUM CHLORIDE 1000 ML: 9 INJECTION, SOLUTION INTRAVENOUS at 02:30

## 2024-06-16 RX ADMIN — POTASSIUM BICARBONATE 40 MEQ: 782 TABLET, EFFERVESCENT ORAL at 03:00

## 2024-06-16 RX ADMIN — SODIUM CHLORIDE 1000 ML: 9 INJECTION, SOLUTION INTRAVENOUS at 02:00

## 2024-06-16 ASSESSMENT — LIFESTYLE VARIABLES: HOW OFTEN DO YOU HAVE A DRINK CONTAINING ALCOHOL: NEVER

## 2024-06-16 ASSESSMENT — PAIN - FUNCTIONAL ASSESSMENT: PAIN_FUNCTIONAL_ASSESSMENT: NONE - DENIES PAIN

## 2024-06-16 NOTE — ED PROVIDER NOTES
48-year-old female presents to the emergency department after an accidental overdose in which she thought was cocaine.  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  She complains of diffuse nonradiating chest pain she is unsure about the nature of the pain description.  Otherwise she denies the following: Headache, neck pain, Suhail pain, nausea, vomiting, diaphoresis, urinary symptoms, GI bleed, focal weakness/numbness.  She is unsure whether or not she fell.      Chief Complaint   Patient presents with    Drug Overdose     Dropped off at front entrance with minimal responsiveness whispered \"cocaine overdose\"         Review of Systems   Pert stated in the hpi above   Physical Exam  Vitals reviewed.   Constitutional:       General: She is not in acute distress.     Appearance: She is not ill-appearing.   HENT:      Head: Normocephalic.      Right Ear: External ear normal.      Left Ear: External ear normal.      Nose: Nose normal.      Mouth/Throat:      Mouth: Mucous membranes are moist.   Eyes:      General:         Right eye: No discharge.         Left eye: No discharge.      Conjunctiva/sclera: Conjunctivae normal.   Cardiovascular:      Rate and Rhythm: Normal rate and regular rhythm.      Heart sounds:      No friction rub. No gallop.   Pulmonary:      Effort: No respiratory distress.      Breath sounds: No stridor.   Abdominal:      General: There is no distension.      Tenderness: There is no abdominal tenderness. There is no guarding or rebound.   Musculoskeletal:         General: No deformity or signs of injury.      Cervical back: Normal range of motion and neck supple. No rigidity or tenderness.   Skin:     Coloration: Skin is not jaundiced.   Neurological:      Mental Status: She is alert.      Sensory: No sensory deficit.      Motor: No weakness.   Psychiatric:         Mood and Affect: Mood normal.         Behavior: Behavior

## 2024-06-17 LAB
EKG ATRIAL RATE: 88 BPM
EKG P AXIS: 51 DEGREES
EKG P-R INTERVAL: 154 MS
EKG Q-T INTERVAL: 390 MS
EKG QRS DURATION: 72 MS
EKG QTC CALCULATION (BAZETT): 471 MS
EKG R AXIS: 57 DEGREES
EKG T AXIS: 18 DEGREES
EKG VENTRICULAR RATE: 88 BPM

## 2024-06-17 PROCEDURE — 93010 ELECTROCARDIOGRAM REPORT: CPT | Performed by: INTERNAL MEDICINE

## 2024-08-08 ENCOUNTER — HOSPITAL ENCOUNTER (OUTPATIENT)
Dept: MAMMOGRAPHY | Age: 49
Discharge: HOME | End: 2024-08-10
Attending: INTERNAL MEDICINE
Payer: MEDICAID

## 2024-08-08 VITALS — WEIGHT: 195 LBS | HEIGHT: 65 IN | BODY MASS INDEX: 32.49 KG/M2

## 2024-08-08 DIAGNOSIS — Z12.31 SCREENING MAMMOGRAM FOR HIGH-RISK PATIENT: ICD-10-CM

## 2024-08-08 PROCEDURE — 77063 BREAST TOMOSYNTHESIS BI: CPT

## 2025-07-16 ENCOUNTER — TRANSCRIBE ORDERS (OUTPATIENT)
Dept: ADMINISTRATIVE | Age: 50
End: 2025-07-16

## 2025-07-16 DIAGNOSIS — Z12.31 ENCOUNTER FOR SCREENING MAMMOGRAM FOR MALIGNANT NEOPLASM OF BREAST: Primary | ICD-10-CM

## 2025-08-11 ENCOUNTER — HOSPITAL ENCOUNTER (OUTPATIENT)
Dept: MAMMOGRAPHY | Age: 50
Discharge: HOME | End: 2025-08-13
Attending: INTERNAL MEDICINE
Payer: MEDICAID

## 2025-08-11 VITALS — HEIGHT: 65 IN | WEIGHT: 198 LBS | BODY MASS INDEX: 32.99 KG/M2

## 2025-08-11 DIAGNOSIS — Z12.31 ENCOUNTER FOR SCREENING MAMMOGRAM FOR MALIGNANT NEOPLASM OF BREAST: ICD-10-CM

## 2025-08-11 PROCEDURE — 77063 BREAST TOMOSYNTHESIS BI: CPT

## (undated) DEVICE — DISCONTINUED USE 390648 LIGASURE REPROCESS IMPACT 13.5MMX18CM

## (undated) DEVICE — PENCIL ES L3M BTTN SWCH HOLSTER W/ BLDE ELECTRD EDGE

## (undated) DEVICE — Device

## (undated) DEVICE — READY WET SKIN SCRUB TRAY-LF: Brand: MEDLINE INDUSTRIES, INC.

## (undated) DEVICE — SEALER TISS L20CM DIA13MM ADV BPLR L CRV JAW OPN APPRCH

## (undated) DEVICE — NEEDLE HYPO 25GA L1.5IN BLU POLYPR HUB S STL REG BVL STR

## (undated) DEVICE — GRADUATE

## (undated) DEVICE — TUBING, SUCTION, 1/4" X 10', STRAIGHT: Brand: MEDLINE

## (undated) DEVICE — Z DISCONTINUED PER MEDLINE USE 2741943 DRESSING AQUACEL 10 IN ALG W9XL25CM SIL CVR WTRPRF VIR BACT BARR ANTIMIC

## (undated) DEVICE — YANKAUER,POOLE TIP,STERILE,50/CS: Brand: MEDLINE

## (undated) DEVICE — GOWN,SIRUS,NONRNF,SETINSLV,XL,20/CS: Brand: MEDLINE

## (undated) DEVICE — MARKER,SKIN,WI/RULER AND LABELS: Brand: MEDLINE

## (undated) DEVICE — SET MAJOR INSTR HOUSE

## (undated) DEVICE — PACK,LAPAROTOMY,NO GOWNS: Brand: MEDLINE

## (undated) DEVICE — BANDAGE,GAUZE,4.5"X4.1YD,STERILE,LF: Brand: MEDLINE

## (undated) DEVICE — DOUBLE BASIN SET: Brand: MEDLINE INDUSTRIES, INC.

## (undated) DEVICE — TOWEL,OR,DSP,ST,BLUE,STD,6/PK,12PK/CS: Brand: MEDLINE

## (undated) DEVICE — SYRINGE IRRIG 60ML SFT PLIABLE BLB EZ TO GRP 1 HND USE W/

## (undated) DEVICE — PAD,ABDOMINAL,5"X9",ST,LF,25/BX: Brand: MEDLINE INDUSTRIES, INC.

## (undated) DEVICE — GLOVE ORANGE PI 7   MSG9070

## (undated) DEVICE — PAD MATERNITY CURITY ADH STRIP DISP

## (undated) DEVICE — DRAPE 64X41IN RADIOLOGY C ARM EQUIP STER

## (undated) DEVICE — GENERATOR ELECSURG FORCETRAID

## (undated) DEVICE — CLOTH SURG PREP PREOPERATIVE CHLORHEXIDINE GLUC 2% READYPREP

## (undated) DEVICE — PATIENT RETURN ELECTRODE, SINGLE-USE, CONTACT QUALITY MONITORING, ADULT, WITH 9FT CORD, FOR PATIENTS WEIGING OVER 33LBS. (15KG): Brand: MEGADYNE

## (undated) DEVICE — COVER,LIGHT HANDLE,FLX,1/PK: Brand: MEDLINE INDUSTRIES, INC.

## (undated) DEVICE — PAD GZ BORD ST 4INX10IN 2X8IN

## (undated) DEVICE — PACK EXT II SIRUS

## (undated) DEVICE — SOLUTION IV IRRIG POUR BRL 0.9% SODIUM CHL 2F7124

## (undated) DEVICE — MAGNETIC DRAPE: Brand: DEVON

## (undated) DEVICE — INTENDED FOR TISSUE SEPARATION, AND OTHER PROCEDURES THAT REQUIRE A SHARP SURGICAL BLADE TO PUNCTURE OR CUT.: Brand: BARD-PARKER ® STAINLESS STEEL BLADES

## (undated) DEVICE — SOLUTION IRRIG 1000ML 0.9% SOD CHL USP POUR PLAS BTL

## (undated) DEVICE — GAUZE,SPONGE,4"X4",16PLY,XRAY,STRL,LF: Brand: MEDLINE

## (undated) DEVICE — BANDAGE ADH BK SURG PLAS BKING 4INX8IN

## (undated) DEVICE — BANDAGE,SELF ADHRNT,COFLEX,4"X5YD,STRL: Brand: COLABEL

## (undated) DEVICE — DRESSING PETRO W3XL3IN OIL EMUL N ADH GZ KNIT IMPREG CELOS

## (undated) DEVICE — DEVICE SIZING GRAFT FIX 0 1.3 MM KT TENODESIS SUTURETAPE W/ NDL

## (undated) DEVICE — SPONGE,LAP,12"X12",XR,ST,5/PK,40PK/CS: Brand: MEDLINE

## (undated) DEVICE — GLOVE ORANGE PI 7 1/2   MSG9075

## (undated) DEVICE — SPONGE,PEANUT,XRAY,ST,SM,3/8",5/CARD: Brand: MEDLINE INDUSTRIES, INC.

## (undated) DEVICE — ELASTIC BANDAGE: Brand: DEROYAL

## (undated) DEVICE — SHEET SUPPORT

## (undated) DEVICE — GAUZE,SPONGE,4"X4",16PLY,STRL,LF,10/TRAY: Brand: MEDLINE

## (undated) DEVICE — GARMENT,MEDLINE,DVT,INT,CALF,MED, GEN2: Brand: MEDLINE

## (undated) DEVICE — TENODESIS GRAFT SIZING KIT

## (undated) DEVICE — YANKAUER,BULB TIP,W/O VENT,RIGID,STERILE: Brand: MEDLINE

## (undated) DEVICE — TOTAL TRAY, 16FR 10ML SIL FOLEY, URN: Brand: MEDLINE

## (undated) DEVICE — PACK,BASIC I: Brand: MEDLINE

## (undated) DEVICE — CANNULA IV 18GA L15IN BLNT FILL LUERLOCK HUB MJCT

## (undated) DEVICE — SYRINGE MED 10ML LUERLOCK TIP W/O SFTY DISP

## (undated) DEVICE — TOWEL,OR,DSP,ST,GREEN,DLX,XR,4/PK,20PK/C: Brand: MEDLINE

## (undated) DEVICE — APPLICATOR MEDICATED 26 CC SOLUTION HI LT ORNG CHLORAPREP

## (undated) DEVICE — Z DISCONTINUED GLOVE SURG SZ 7 L12IN FNGR THK13MIL WHT ISOLEX POLYISOPRENE

## (undated) DEVICE — ELECTRODE PT RET AD L9FT HI MOIST COND ADH HYDRGEL CORDED

## (undated) DEVICE — SUTURE ABSRB L6IN L37MM 0 GS-21 GRN 1/2 CIR TAPR PNT NDL VLOCL0306

## (undated) DEVICE — NDL CNTR 40CT FM MAG: Brand: MEDLINE INDUSTRIES, INC.

## (undated) DEVICE — SPONGE LAP W18XL18IN WHT COT 4 PLY FLD STRUNG RADPQ DISP ST

## (undated) DEVICE — RETAINER SURG RADPQ VISCERA IMPRNT W/ CNTOUR LN SURGIFISH

## (undated) DEVICE — BARRIER ADH W3XL4IN UTER PELV ABSRB GYNECARE INTCEED